# Patient Record
Sex: FEMALE | Race: WHITE | NOT HISPANIC OR LATINO | Employment: UNEMPLOYED | URBAN - METROPOLITAN AREA
[De-identification: names, ages, dates, MRNs, and addresses within clinical notes are randomized per-mention and may not be internally consistent; named-entity substitution may affect disease eponyms.]

---

## 2017-01-05 ENCOUNTER — ROUTINE PRENATAL (OUTPATIENT)
Dept: OBGYN | Facility: CLINIC | Age: 22
End: 2017-01-05
Payer: MEDICAID

## 2017-01-05 VITALS — WEIGHT: 249 LBS | DIASTOLIC BLOOD PRESSURE: 92 MMHG | BODY MASS INDEX: 44.12 KG/M2 | SYSTOLIC BLOOD PRESSURE: 136 MMHG

## 2017-01-05 LAB
APPEARANCE UR: NORMAL
BILIRUB UR STRIP-MCNC: NORMAL MG/DL
COLOR UR AUTO: NORMAL
GLUCOSE UR STRIP.AUTO-MCNC: NORMAL MG/DL
KETONES UR STRIP.AUTO-MCNC: NORMAL MG/DL
LEUKOCYTE ESTERASE UR QL STRIP.AUTO: NORMAL
NITRITE UR QL STRIP.AUTO: NORMAL
PH UR STRIP.AUTO: 6 [PH] (ref 5–8)
PROT UR QL STRIP: 100 MG/DL
RBC UR QL AUTO: NORMAL
SP GR UR STRIP.AUTO: 1.02
UROBILINOGEN UR STRIP-MCNC: NORMAL MG/DL

## 2017-01-05 PROCEDURE — 90040 PR PRENATAL FOLLOW UP: CPT | Performed by: NURSE PRACTITIONER

## 2017-01-05 PROCEDURE — 81002 URINALYSIS NONAUTO W/O SCOPE: CPT | Performed by: NURSE PRACTITIONER

## 2017-01-05 NOTE — PROGRESS NOTES
S:  Pt is  at 36w4d here for routine OB follow up.  No concerns today.  Reports good FM.  Denies VB, LOF, RUCs, or vaginal DC.     O:  Please see above vitals.        FHTs: 140        Fundal ht: 38 cm        Fetal position: unknown        SVE: deferred        GBS negative  -- reviewed w pt.      A:  IUP at 36w4d  Patient Active Problem List    Diagnosis Date Noted   • History of  x 3 needs another csections 2016   • SIDS (sudden infant death syndrome) with last baby at 6 weeks of age 2016   • Tobacco dependence says cut down to 5 cigarettes/day 2016   • Pregnancy 2015   • Supervision of other high risk pregnancy, antepartum 10/15/2015       P:  1.  Continue FKCs.         2.  Labor precautions given.  Instructions given on where to go.  Pt receptive to education.         3.  D/w pt C/S policy. Scheduled pre-op on  and C/S on        4.  Questions answered.         5.  Encouraged adequate water intake       6.  F/u 1wk

## 2017-01-05 NOTE — PROGRESS NOTES
O/B f/u pt. States hands very swollen, headaches. Denies other complications. +   C/S INSTRUCTIONS GIVEN.  Mike #[phone]

## 2017-01-05 NOTE — PROGRESS NOTES
Pre OP Tue Jan 24 @ 9am with Dr Jaimes     C/S Wed, Jan 25 @ 2pm with Dr Jaimes and Dr Sampson to monique  Instructions given.

## 2017-01-05 NOTE — MR AVS SNAPSHOT
Earnestine Freedman Knight   2017 1:30 PM   Routine Prenatal   MRN: [id number]    [facility]   Dept Phone:  [phone]    Description:  Female : 1995   Provider:  EUNICE Foley           Allergies as of 2017     No Known Allergies      You were diagnosed with     Elevated blood pressure   [112593]         Vital Signs     Blood Pressure Weight Last Menstrual Period Smoking Status          136/92 mmHg 112.946 kg (249 lb) (LMP Unknown) Current Every Day Smoker        Basic Information     Date Of Birth Sex Race Ethnicity Preferred Language    1995 Female White Non- English      Your appointments     2017  1:00 PM   OB Follow Up with Taylor Hassan D.N.P.   [facility]    [address]  [place] [address] [phone]            2017  9:00 AM   OB Follow Up with MARIN RICHARDSON   [facility]    [address]  [place] [address] [phone]              Problem List              ICD-10-CM Priority Class Noted - Resolved    Supervision of other high risk pregnancy, antepartum O09.899   10/15/2015 - Present    Pregnancy Z33.1   11/3/2015 - Present    History of  x 3 needs another csections Z98.891   2016 - Present    SIDS (sudden infant death syndrome) with last baby at 6 weeks of age R99   2016 - Present    Tobacco dependence says cut down to 5 cigarettes/day F17.200   2016 - Present      Health Maintenance        Date Due Completion Dates    IMM HEP B VACCINE (1 of 3 - Primary Series) 1993 ---    IMM HEP A VACCINE (1 of 2 - Standard Series) 1994 ---    IMM HPV VACCINE (1 of 3 - Female 3 Dose Series) 2004 ---    IMM VARICELLA (CHICKENPOX) VACCINE (1 of 2 - 2 Dose Adolescent Series) 2006 ---    IMM INFLUENZA (1) 2016    PAP SMEAR 2019    IMM DTaP/Tdap/Td Vaccine (2 - Td) 11/10/2026 11/10/2016, 2015            Results     POCT Urinalysis      Component Value Standard Range & Units    POC Color  Negative    POC Appearance  Negative    POC Leukocyte Esterase trace Negative    POC Nitrites neg Negative    POC Urobiligen  Negative (0.2) mg/dL    POC Protein 100 Negative mg/dL    POC Urine PH 6.0 5.0 - 8.0    POC Blood neg Negative    POC Specific Gravity 1.020 <1.005 - >1.030    POC Ketones neg Negative mg/dL    POC Biliruben  Negative mg/dL    POC Glucose neg Negative mg/dL                        Current Immunizations     Influenza Vaccine Quad Inj (Preserved) 11/4/2015  5:03 AM    Pneumococcal polysaccharide vaccine (PPSV-23) 11/4/2015  5:59 PM    Tdap Vaccine 11/10/2016  9:11 AM, 11/4/2015  5:04 AM      Below and/or attached are the medications your provider expects you to take. Review all of your home medications and newly ordered medications with your provider and/or pharmacist. Follow medication instructions as directed by your provider and/or pharmacist. Please keep your medication list with you and share with your provider. Update the information when medications are discontinued, doses are changed, or new medications (including over-the-counter products) are added; and carry medication information at all times in the event of emergency situations     Allergies:  No Known Allergies          Medications  Valid as of: January 05, 2017 -  2:07 PM    Generic Name Brand Name Tablet Size Instructions for use    Prenatal MV-Min-Fe Fum-FA-DHA   Take  by mouth.        .                 Medicines prescribed today were sent to:     [facility] [address] [place] [address] [address] [place], [place] [address] [address] [place] [address]    Phone: [phone] Fax: [phone]    Open 24 Hours?: No      Medication refill instructions:       If your prescription bottle indicates you have medication refills left, it is not necessary to call your provider’s office. Please contact your pharmacy and they will refill your medication.    If your prescription bottle  indicates you do not have any refills left, you may request refills at any time through one of the following ways: The online [employer] system (except Urgent Care), by calling your provider’s office, or by asking your pharmacy to contact your provider’s office with a refill request. Medication refills are processed only during regular business hours and may not be available until the next business day. Your provider may request additional information or to have a follow-up visit with you prior to refilling your medication.   *Please Note: Medication refills are assigned a new Rx number when refilled electronically. Your pharmacy may indicate that no refills were authorized even though a new prescription for the same medication is available at the pharmacy. Please request the medicine by name with the pharmacy before contacting your provider for a refill.        Instructions         1.  Continue FKCs.         2.  Labor precautions given.  Instructions given on where to go.  Pt receptive to education.         3.  D/w pt C/S policy. Scheduled pre-op on 1/24 and C/S on 1/25       4.  Questions answered.         5.  Encouraged adequate water intake       6.  F/u 1wk            [facility] Status: Patient Declined

## 2017-01-05 NOTE — PATIENT INSTRUCTIONS
1.  Continue FKCs.         2.  Labor precautions given.  Instructions given on where to go.  Pt receptive to education.         3.  D/w pt C/S policy. Scheduled pre-op on 1/24 and C/S on 1/25       4.  Questions answered.         5.  Encouraged adequate water intake       6.  F/u 1wk

## 2017-01-10 ENCOUNTER — HOSPITAL ENCOUNTER (OUTPATIENT)
Facility: MEDICAL CENTER | Age: 22
End: 2017-01-10
Attending: OBSTETRICS & GYNECOLOGY | Admitting: OBSTETRICS & GYNECOLOGY
Payer: MEDICAID

## 2017-01-10 VITALS
HEART RATE: 111 BPM | DIASTOLIC BLOOD PRESSURE: 91 MMHG | OXYGEN SATURATION: 98 % | TEMPERATURE: 98.5 F | HEIGHT: 64 IN | BODY MASS INDEX: 42.51 KG/M2 | SYSTOLIC BLOOD PRESSURE: 134 MMHG | WEIGHT: 249 LBS

## 2017-01-10 LAB
ALBUMIN SERPL BCP-MCNC: 2.9 G/DL (ref 3.2–4.9)
ALBUMIN/GLOB SERPL: 1.1 G/DL
ALP SERPL-CCNC: 154 U/L (ref 30–99)
ALT SERPL-CCNC: 8 U/L (ref 2–50)
ANION GAP SERPL CALC-SCNC: 8 MMOL/L (ref 0–11.9)
APPEARANCE UR: ABNORMAL
AST SERPL-CCNC: 14 U/L (ref 12–45)
BASOPHILS # BLD AUTO: 0.4 % (ref 0–1.8)
BASOPHILS # BLD: 0.04 K/UL (ref 0–0.12)
BILIRUB SERPL-MCNC: 0.3 MG/DL (ref 0.1–1.5)
BUN SERPL-MCNC: 6 MG/DL (ref 8–22)
CALCIUM SERPL-MCNC: 8.4 MG/DL (ref 8.5–10.5)
CHLORIDE SERPL-SCNC: 107 MMOL/L (ref 96–112)
CO2 SERPL-SCNC: 19 MMOL/L (ref 20–33)
COLOR UR AUTO: YELLOW
CREAT SERPL-MCNC: 0.5 MG/DL (ref 0.5–1.4)
EOSINOPHIL # BLD AUTO: 0.1 K/UL (ref 0–0.51)
EOSINOPHIL NFR BLD: 0.9 % (ref 0–6.9)
ERYTHROCYTE [DISTWIDTH] IN BLOOD BY AUTOMATED COUNT: 46.2 FL (ref 35.9–50)
FLUAV H1 2009 PAND RNA SPEC QL NAA+PROBE: NOT DETECTED
FLUAV RNA SPEC QL NAA+PROBE: NEGATIVE
FLUBV RNA SPEC QL NAA+PROBE: NEGATIVE
GFR SERPL CREATININE-BSD FRML MDRD: >60 ML/MIN/1.73 M 2
GLOBULIN SER CALC-MCNC: 2.7 G/DL (ref 1.9–3.5)
GLUCOSE SERPL-MCNC: 96 MG/DL (ref 65–99)
GLUCOSE UR QL STRIP.AUTO: NEGATIVE MG/DL
HCT VFR BLD AUTO: 34.5 % (ref 37–47)
HGB BLD-MCNC: 11.4 G/DL (ref 12–16)
IMM GRANULOCYTES # BLD AUTO: 0.07 K/UL (ref 0–0.11)
IMM GRANULOCYTES NFR BLD AUTO: 0.6 % (ref 0–0.9)
KETONES UR QL STRIP.AUTO: NEGATIVE MG/DL
LEUKOCYTE ESTERASE UR QL STRIP.AUTO: ABNORMAL
LYMPHOCYTES # BLD AUTO: 1.28 K/UL (ref 1–4.8)
LYMPHOCYTES NFR BLD: 11.2 % (ref 22–41)
MCH RBC QN AUTO: 27.9 PG (ref 27–33)
MCHC RBC AUTO-ENTMCNC: 33 G/DL (ref 33.6–35)
MCV RBC AUTO: 84.4 FL (ref 81.4–97.8)
MONOCYTES # BLD AUTO: 0.81 K/UL (ref 0–0.85)
MONOCYTES NFR BLD AUTO: 7.1 % (ref 0–13.4)
NEUTROPHILS # BLD AUTO: 9.11 K/UL (ref 2–7.15)
NEUTROPHILS NFR BLD: 79.8 % (ref 44–72)
NITRITE UR QL STRIP.AUTO: NEGATIVE
NRBC # BLD AUTO: 0 K/UL
NRBC BLD AUTO-RTO: 0 /100 WBC
PH UR STRIP.AUTO: 6.5 [PH]
PLATELET # BLD AUTO: 155 K/UL (ref 164–446)
PMV BLD AUTO: 11.2 FL (ref 9–12.9)
POTASSIUM SERPL-SCNC: 3.8 MMOL/L (ref 3.6–5.5)
PROT SERPL-MCNC: 5.6 G/DL (ref 6–8.2)
PROT UR QL STRIP: NEGATIVE MG/DL
RBC # BLD AUTO: 4.09 M/UL (ref 4.2–5.4)
RBC UR QL AUTO: NEGATIVE
SODIUM SERPL-SCNC: 134 MMOL/L (ref 135–145)
SP GR UR: 1.01
URATE SERPL-MCNC: 5.6 MG/DL (ref 1.9–8.2)
WBC # BLD AUTO: 11.4 K/UL (ref 4.8–10.8)

## 2017-01-10 PROCEDURE — 36415 COLL VENOUS BLD VENIPUNCTURE: CPT

## 2017-01-10 PROCEDURE — 80053 COMPREHEN METABOLIC PANEL: CPT

## 2017-01-10 PROCEDURE — 87503 INFLUENZA DNA AMP PROB ADDL: CPT

## 2017-01-10 PROCEDURE — 59025 FETAL NON-STRESS TEST: CPT | Performed by: OBSTETRICS & GYNECOLOGY

## 2017-01-10 PROCEDURE — 87502 INFLUENZA DNA AMP PROBE: CPT

## 2017-01-10 PROCEDURE — 84550 ASSAY OF BLOOD/URIC ACID: CPT

## 2017-01-10 PROCEDURE — 85025 COMPLETE CBC W/AUTO DIFF WBC: CPT

## 2017-01-10 PROCEDURE — 81002 URINALYSIS NONAUTO W/O SCOPE: CPT

## 2017-01-10 NOTE — PROGRESS NOTES
[facility] LABOR AND DELIVERY TRIAGE PROGRESS NOTE    PATIENT ID:  NAME:  Earnestine Knight  MRN:               [id number]  YOB: 1995     21 y.o. female  at 37w2d.    Subjective: Pt presents complaining of a cough with increasing sputum production x1 week.  She reports that her mother had bronchitis approximately 2 weeks ago with similar symptoms.  She describes a cough starting one week ago with production of green/yellow sputum over the past 2-3 days.  She has also had one episode of nausea and vomiting yesterday.  She denies any fever/chills, changes to urination, constipation, diarrhea, body aches, changes to vision.  She also denies CP but does endorse some shortness of breath.  Denies swelling to hands or feet.    Patient does smoke 2 ppd of cigarettes, denies EtOH or other drug use    Pregnancy complicated by maternal tobacco use    negative  For CTXS.   negative Feels pain   negative for LOF  negative for vaginal bleeding.   positive for fetal movement    ROS: as above    Objective:    Filed Vitals:    01/10/17 1442 01/10/17 1445 01/10/17 1447 01/10/17 1452   BP:  139/98     Pulse: 103 108 98 93   Temp:       Height:       Weight:       SpO2: 99%  97% 99%     Temp (24hrs), Av.9 °C (98.5 °F), Min:36.9 °C (98.5 °F), Max:36.9 °C (98.5 °F)    General: No acute distress, resting comfortably in bed.  HEENT: normocephalic, nontraumatic, PERRLA, EOMI  Cardiovascular: Heart RRR with no murmurs, rubs or gallops. Distal Pulses 2+  Respiratory: symmetric chest expansion, lungs with bilateral crackles, with no wheezes or rhonci  Abdomen: gravid, nontender  Musculoskeletal: strength 5/5 in four extremities  Neuro: non focal with no numbness, tingling or changes in sensation    Cervix:  Not examed  [place]: Uterine Contractions none    FHRM: Baseline 140, Accels, no decels, moderate variability    UA: small leukocytes, otherwise unremarkable  Lab Results   Component Value Date/Time    SODIUM 134*  01/10/2017 03:23 PM    POTASSIUM 3.8 01/10/2017 03:23 PM    CHLORIDE 107 01/10/2017 03:23 PM    CO2 19* 01/10/2017 03:23 PM    GLUCOSE 96 01/10/2017 03:23 PM    BUN 6* 01/10/2017 03:23 PM    CREATININE 0.50 01/10/2017 03:23 PM      Lab Results   Component Value Date/Time    WBC 11.4* 01/10/2017 03:23 PM    RBC 4.09* 01/10/2017 03:23 PM    HEMOGLOBIN 11.4* 01/10/2017 03:23 PM    HEMATOCRIT 34.5* 01/10/2017 03:23 PM    MCV 84.4 01/10/2017 03:23 PM    MCH 27.9 01/10/2017 03:23 PM    MCHC 33.0* 01/10/2017 03:23 PM    MPV 11.2 01/10/2017 03:23 PM    NEUTROPHILS-POLYS 79.80* 01/10/2017 03:23 PM    LYMPHOCYTES 11.20* 01/10/2017 03:23 PM    MONOCYTES 7.10 01/10/2017 03:23 PM    EOSINOPHILS 0.90 01/10/2017 03:23 PM    BASOPHILS 0.40 01/10/2017 03:23 PM      Uric acid 5.6    Assessment/Plan: 21 y.o. female  at 37w2d with likely viral respiratory process vs. Atypical CAP  -Patient evaluated for PIH given initial elevated BP of 150s/100s although quickly decreased to 130s/80s.  UA negative for protein, LFT WNL, Plt 150,   -Mildly elevated WBC of 11.4  -Reactive and reassuring NST, no contractions on TOCO  -Will d/c patient home with Rx for Z-pack, awaiting rapid flu result at time of d/c, patient given Rx for Tamiflu with instructions to fill and take only if notified by house staff  -encourage smoking cessation, oral hydration, and explain likely viral etiology of symptoms  -return precautions given including worsening symptoms, fever, or any other new or concerning symptoms  -Patient is cleared to return home with family. Encouraged to see MD for increased painful uterine contractions @ 3-5, vaginal bleeding, loss of fluid, or other serious symptoms.      Discussed case with Dr. Jaimes, [facility] Attending. Case was discussed and attending agreed with plan prior to discharge of patient.    Brian Padgett M.D.

## 2017-01-10 NOTE — PROGRESS NOTES
"1355 - 20 y/o  EDC , EGA 37.2, here to LDA 5 with mother. C/O cough for the last week, with an increase in green/yellowish sputum. EFM/TOCO applied, Patient states positive FM. Denies vaginal LOF or Bleeding. BP's elevated and MD aware. UC collected, small leukocytes noted.   1400 - Dr Padgett at bedside to assess pt. Discussed with Dr hale POC.  1415 - Orders received for flu swab, PIH labs due to elevated BP's. Orders placed. \  1530 - Lab results and flu swab pending. RN to room, oral hydration provided. Pt resting and \"trying to sleep.\" Call light within reach.   1645 - Lab results viewed by Dr Padgett. Flu lab pending. Orders received to d/c pt home with prescription for abx. Plan to call pt back if flu is +. Pt provided rx for Tamiflu if flu is positive.   0 - Discussed s/s of labor, decrease FM, kick counts, lof/bleeding and when to return back to LND. Pt verbalizes understanding and ambulated off unit, stable on feet.   "

## 2017-01-11 ENCOUNTER — HOSPITAL ENCOUNTER (INPATIENT)
Facility: MEDICAL CENTER | Age: 22
LOS: 4 days | End: 2017-01-15
Attending: OBSTETRICS & GYNECOLOGY | Admitting: OBSTETRICS & GYNECOLOGY
Payer: MEDICAID

## 2017-01-11 ENCOUNTER — ROUTINE PRENATAL (OUTPATIENT)
Dept: OBGYN | Facility: CLINIC | Age: 22
End: 2017-01-11
Payer: MEDICAID

## 2017-01-11 VITALS — WEIGHT: 250 LBS | SYSTOLIC BLOOD PRESSURE: 140 MMHG | BODY MASS INDEX: 42.89 KG/M2 | DIASTOLIC BLOOD PRESSURE: 102 MMHG

## 2017-01-11 DIAGNOSIS — I15.9 SECONDARY HYPERTENSION: ICD-10-CM

## 2017-01-11 LAB
ALBUMIN SERPL BCP-MCNC: 3 G/DL (ref 3.2–4.9)
ALBUMIN/GLOB SERPL: 1.1 G/DL
ALP SERPL-CCNC: 157 U/L (ref 30–99)
ALT SERPL-CCNC: 6 U/L (ref 2–50)
ANION GAP SERPL CALC-SCNC: 9 MMOL/L (ref 0–11.9)
APPEARANCE UR: ABNORMAL
APPEARANCE UR: NORMAL
AST SERPL-CCNC: 12 U/L (ref 12–45)
BACTERIA #/AREA URNS HPF: ABNORMAL /HPF
BASOPHILS # BLD AUTO: 0.6 % (ref 0–1.8)
BASOPHILS # BLD: 0.05 K/UL (ref 0–0.12)
BILIRUB SERPL-MCNC: 0.3 MG/DL (ref 0.1–1.5)
BILIRUB UR QL STRIP.AUTO: NEGATIVE
BILIRUB UR STRIP-MCNC: NORMAL MG/DL
BUN SERPL-MCNC: 9 MG/DL (ref 8–22)
CALCIUM SERPL-MCNC: 8.8 MG/DL (ref 8.5–10.5)
CAOX CRY #/AREA URNS HPF: ABNORMAL /HPF
CHLORIDE SERPL-SCNC: 108 MMOL/L (ref 96–112)
CO2 SERPL-SCNC: 19 MMOL/L (ref 20–33)
COLOR UR AUTO: NORMAL
COLOR UR: YELLOW
CREAT SERPL-MCNC: 0.53 MG/DL (ref 0.5–1.4)
EOSINOPHIL # BLD AUTO: 0.23 K/UL (ref 0–0.51)
EOSINOPHIL NFR BLD: 2.6 % (ref 0–6.9)
EPI CELLS #/AREA URNS HPF: ABNORMAL /HPF
ERYTHROCYTE [DISTWIDTH] IN BLOOD BY AUTOMATED COUNT: 46.9 FL (ref 35.9–50)
GFR SERPL CREATININE-BSD FRML MDRD: >60 ML/MIN/1.73 M 2
GLOBULIN SER CALC-MCNC: 2.8 G/DL (ref 1.9–3.5)
GLUCOSE SERPL-MCNC: 127 MG/DL (ref 65–99)
GLUCOSE UR STRIP.AUTO-MCNC: NEGATIVE MG/DL
GLUCOSE UR STRIP.AUTO-MCNC: NORMAL MG/DL
HCT VFR BLD AUTO: 35.1 % (ref 37–47)
HGB BLD-MCNC: 11.9 G/DL (ref 12–16)
HOLDING TUBE BB 8507: NORMAL
IMM GRANULOCYTES # BLD AUTO: 0.06 K/UL (ref 0–0.11)
IMM GRANULOCYTES NFR BLD AUTO: 0.7 % (ref 0–0.9)
KETONES UR STRIP.AUTO-MCNC: ABNORMAL MG/DL
KETONES UR STRIP.AUTO-MCNC: NORMAL MG/DL
LEUKOCYTE ESTERASE UR QL STRIP.AUTO: ABNORMAL
LEUKOCYTE ESTERASE UR QL STRIP.AUTO: NORMAL
LYMPHOCYTES # BLD AUTO: 1.53 K/UL (ref 1–4.8)
LYMPHOCYTES NFR BLD: 17 % (ref 22–41)
MCH RBC QN AUTO: 28.8 PG (ref 27–33)
MCHC RBC AUTO-ENTMCNC: 33.9 G/DL (ref 33.6–35)
MCV RBC AUTO: 85 FL (ref 81.4–97.8)
MICRO URNS: ABNORMAL
MONOCYTES # BLD AUTO: 0.7 K/UL (ref 0–0.85)
MONOCYTES NFR BLD AUTO: 7.8 % (ref 0–13.4)
MUCOUS THREADS #/AREA URNS HPF: ABNORMAL /HPF
NEUTROPHILS # BLD AUTO: 6.44 K/UL (ref 2–7.15)
NEUTROPHILS NFR BLD: 71.3 % (ref 44–72)
NITRITE UR QL STRIP.AUTO: NEGATIVE
NITRITE UR QL STRIP.AUTO: NORMAL
NRBC # BLD AUTO: 0 K/UL
NRBC BLD AUTO-RTO: 0 /100 WBC
NST ACOUSTIC STIMULATION: NORMAL
NST ACTION NECESSARY: NORMAL
NST ASSESSMENT: NORMAL
NST BASELINE: 130
NST INDICATIONS: NORMAL
NST OTHER DATA: NORMAL
NST READ BY: NORMAL
NST RETURN: NORMAL
NST UTERINE ACTIVITY: NORMAL
PH UR STRIP.AUTO: 6 [PH]
PH UR STRIP.AUTO: 6 [PH] (ref 5–8)
PLATELET # BLD AUTO: 156 K/UL (ref 164–446)
PMV BLD AUTO: 10.7 FL (ref 9–12.9)
POTASSIUM SERPL-SCNC: 3.6 MMOL/L (ref 3.6–5.5)
PROT SERPL-MCNC: 5.8 G/DL (ref 6–8.2)
PROT UR QL STRIP: 100 MG/DL
PROT UR QL STRIP: 50 MG/DL
RBC # BLD AUTO: 4.13 M/UL (ref 4.2–5.4)
RBC # URNS HPF: ABNORMAL /HPF
RBC UR QL AUTO: NEGATIVE
RBC UR QL AUTO: NORMAL
SODIUM SERPL-SCNC: 136 MMOL/L (ref 135–145)
SP GR UR STRIP.AUTO: 1.02
SP GR UR STRIP.AUTO: 1.03
TRANS CELLS #/AREA URNS HPF: ABNORMAL /HPF
URATE SERPL-MCNC: 5.4 MG/DL (ref 1.9–8.2)
UROBILINOGEN UR STRIP-MCNC: NORMAL MG/DL
WBC # BLD AUTO: 9 K/UL (ref 4.8–10.8)
WBC #/AREA URNS HPF: ABNORMAL /HPF

## 2017-01-11 PROCEDURE — 84550 ASSAY OF BLOOD/URIC ACID: CPT

## 2017-01-11 PROCEDURE — 700111 HCHG RX REV CODE 636 W/ 250 OVERRIDE (IP): Performed by: OBSTETRICS & GYNECOLOGY

## 2017-01-11 PROCEDURE — 36415 COLL VENOUS BLD VENIPUNCTURE: CPT

## 2017-01-11 PROCEDURE — 81001 URINALYSIS AUTO W/SCOPE: CPT

## 2017-01-11 PROCEDURE — 4A1HX4Z MONITORING OF PRODUCTS OF CONCEPTION, CARDIAC ELECTRICAL ACTIVITY, EXTERNAL APPROACH: ICD-10-PCS | Performed by: OBSTETRICS & GYNECOLOGY

## 2017-01-11 PROCEDURE — 81002 URINALYSIS NONAUTO W/O SCOPE: CPT | Performed by: NURSE PRACTITIONER

## 2017-01-11 PROCEDURE — 700102 HCHG RX REV CODE 250 W/ 637 OVERRIDE(OP): Performed by: ANESTHESIOLOGY

## 2017-01-11 PROCEDURE — A9270 NON-COVERED ITEM OR SERVICE: HCPCS | Performed by: ANESTHESIOLOGY

## 2017-01-11 PROCEDURE — 700111 HCHG RX REV CODE 636 W/ 250 OVERRIDE (IP)

## 2017-01-11 PROCEDURE — 90040 PR PRENATAL FOLLOW UP: CPT | Performed by: NURSE PRACTITIONER

## 2017-01-11 PROCEDURE — 306828 HCHG ANES-TIME GENERAL: Performed by: OBSTETRICS & GYNECOLOGY

## 2017-01-11 PROCEDURE — 59025 FETAL NON-STRESS TEST: CPT | Performed by: NURSE PRACTITIONER

## 2017-01-11 PROCEDURE — 770007 HCHG ROOM/CARE - OB SEMI PRIVATE (*

## 2017-01-11 PROCEDURE — 85025 COMPLETE CBC W/AUTO DIFF WBC: CPT

## 2017-01-11 PROCEDURE — 10907ZC DRAINAGE OF AMNIOTIC FLUID, THERAPEUTIC FROM PRODUCTS OF CONCEPTION, VIA NATURAL OR ARTIFICIAL OPENING: ICD-10-PCS | Performed by: OBSTETRICS & GYNECOLOGY

## 2017-01-11 PROCEDURE — 700111 HCHG RX REV CODE 636 W/ 250 OVERRIDE (IP): Performed by: ANESTHESIOLOGY

## 2017-01-11 PROCEDURE — 80053 COMPREHEN METABOLIC PANEL: CPT

## 2017-01-11 RX ORDER — MORPHINE SULFATE 4 MG/ML
1-2 INJECTION, SOLUTION INTRAMUSCULAR; INTRAVENOUS
Status: DISCONTINUED | OUTPATIENT
Start: 2017-01-11 | End: 2017-01-12

## 2017-01-11 RX ORDER — SODIUM CHLORIDE, SODIUM LACTATE, POTASSIUM CHLORIDE, CALCIUM CHLORIDE 600; 310; 30; 20 MG/100ML; MG/100ML; MG/100ML; MG/100ML
INJECTION, SOLUTION INTRAVENOUS CONTINUOUS
Status: DISCONTINUED | OUTPATIENT
Start: 2017-01-11 | End: 2017-01-12

## 2017-01-11 RX ORDER — PROCHLORPERAZINE 25 MG
25 SUPPOSITORY, RECTAL RECTAL EVERY 12 HOURS PRN
Status: DISCONTINUED | OUTPATIENT
Start: 2017-01-11 | End: 2017-01-12

## 2017-01-11 RX ORDER — SODIUM CHLORIDE, SODIUM LACTATE, POTASSIUM CHLORIDE, CALCIUM CHLORIDE 600; 310; 30; 20 MG/100ML; MG/100ML; MG/100ML; MG/100ML
1500 INJECTION, SOLUTION INTRAVENOUS
Status: COMPLETED | OUTPATIENT
Start: 2017-01-11 | End: 2017-01-11

## 2017-01-11 RX ORDER — MORPHINE SULFATE 4 MG/ML
3-4 INJECTION, SOLUTION INTRAMUSCULAR; INTRAVENOUS
Status: DISCONTINUED | OUTPATIENT
Start: 2017-01-11 | End: 2017-01-12

## 2017-01-11 RX ORDER — METOCLOPRAMIDE HYDROCHLORIDE 5 MG/ML
10 INJECTION INTRAMUSCULAR; INTRAVENOUS
Status: COMPLETED | OUTPATIENT
Start: 2017-01-11 | End: 2017-01-11

## 2017-01-11 RX ORDER — OXYCODONE HYDROCHLORIDE AND ACETAMINOPHEN 5; 325 MG/1; MG/1
1 TABLET ORAL EVERY 4 HOURS PRN
Status: DISCONTINUED | OUTPATIENT
Start: 2017-01-11 | End: 2017-01-12

## 2017-01-11 RX ORDER — ONDANSETRON 2 MG/ML
4 INJECTION INTRAMUSCULAR; INTRAVENOUS EVERY 6 HOURS PRN
Status: DISCONTINUED | OUTPATIENT
Start: 2017-01-11 | End: 2017-01-12

## 2017-01-11 RX ORDER — OXYCODONE HYDROCHLORIDE AND ACETAMINOPHEN 5; 325 MG/1; MG/1
2 TABLET ORAL EVERY 4 HOURS PRN
Status: DISCONTINUED | OUTPATIENT
Start: 2017-01-11 | End: 2017-01-12

## 2017-01-11 RX ORDER — METHYLERGONOVINE MALEATE 0.2 MG/ML
0.2 INJECTION INTRAVENOUS
Status: DISCONTINUED | OUTPATIENT
Start: 2017-01-11 | End: 2017-01-12

## 2017-01-11 RX ORDER — ONDANSETRON 4 MG/1
4 TABLET, ORALLY DISINTEGRATING ORAL EVERY 6 HOURS PRN
Status: DISCONTINUED | OUTPATIENT
Start: 2017-01-11 | End: 2017-01-12

## 2017-01-11 RX ORDER — DIPHENHYDRAMINE HCL 25 MG
25 TABLET ORAL EVERY 6 HOURS PRN
Status: DISCONTINUED | OUTPATIENT
Start: 2017-01-11 | End: 2017-01-12

## 2017-01-11 RX ORDER — DIPHENHYDRAMINE HYDROCHLORIDE 50 MG/ML
25 INJECTION INTRAMUSCULAR; INTRAVENOUS EVERY 6 HOURS PRN
Status: DISCONTINUED | OUTPATIENT
Start: 2017-01-11 | End: 2017-01-12

## 2017-01-11 RX ORDER — CEFAZOLIN SODIUM 1 G/3ML
1 INJECTION, POWDER, FOR SOLUTION INTRAMUSCULAR; INTRAVENOUS
Status: DISCONTINUED | OUTPATIENT
Start: 2017-01-11 | End: 2017-01-12

## 2017-01-11 RX ORDER — IBUPROFEN 600 MG/1
600 TABLET ORAL EVERY 6 HOURS PRN
Status: DISCONTINUED | OUTPATIENT
Start: 2017-01-11 | End: 2017-01-12

## 2017-01-11 RX ADMIN — Medication 2000 ML/HR: at 23:16

## 2017-01-11 RX ADMIN — FAMOTIDINE 20 MG: 10 INJECTION INTRAVENOUS at 22:09

## 2017-01-11 RX ADMIN — SODIUM CHLORIDE, POTASSIUM CHLORIDE, SODIUM LACTATE AND CALCIUM CHLORIDE 1000 ML: 600; 310; 30; 20 INJECTION, SOLUTION INTRAVENOUS at 22:07

## 2017-01-11 RX ADMIN — SODIUM CHLORIDE, POTASSIUM CHLORIDE, SODIUM LACTATE AND CALCIUM CHLORIDE: 600; 310; 30; 20 INJECTION, SOLUTION INTRAVENOUS at 22:08

## 2017-01-11 RX ADMIN — SODIUM CITRATE AND CITRIC ACID MONOHYDRATE 30 ML: 500; 334 SOLUTION ORAL at 22:08

## 2017-01-11 RX ADMIN — METOCLOPRAMIDE 10 MG: 5 INJECTION, SOLUTION INTRAMUSCULAR; INTRAVENOUS at 22:09

## 2017-01-11 ASSESSMENT — LIFESTYLE VARIABLES
ALCOHOL_USE: NO
DO YOU DRINK ALCOHOL: NO
EVER_SMOKED: YES

## 2017-01-11 ASSESSMENT — PAIN SCALES - GENERAL: PAINLEVEL_OUTOF10: 0

## 2017-01-11 NOTE — MR AVS SNAPSHOT
Earnestine Freedman Knight   2017 1:00 PM   Routine Prenatal   MRN: [id number]    Department[facility]   Dept Phone:  [phone]    Description:  Female : 1995   Provider:  Taylor Hassan D.N.P.           Allergies as of 2017     No Known Allergies      You were diagnosed with     Elevated BP   [054453]         Vital Signs     Blood Pressure Weight Last Menstrual Period Smoking Status          140/102 mmHg 113.399 kg (250 lb) (LMP Unknown) Current Every Day Smoker        Basic Information     Date Of Birth Sex Race Ethnicity Preferred Language    1995 Female White Non- English      Your appointments     2017  1:30 PM   Nurse Visit with PC NURSE   [facility])    [address]  [place] [address] [phone]            2017  9:00 AM   OB Follow Up with PC MD   [facility])    [address]  [place] [address] [phone]              Problem List              ICD-10-CM Priority Class Noted - Resolved    Supervision of other high risk pregnancy, antepartum O09.899   10/15/2015 - Present    Pregnancy Z33.1   11/3/2015 - Present    History of  x 3 needs another csections Z98.891   2016 - Present    SIDS (sudden infant death syndrome) with last baby at 6 weeks of age R99   2016 - Present    Tobacco dependence says cut down to 5 cigarettes/day F17.200   2016 - Present      Health Maintenance        Date Due Completion Dates    IMM HEP B VACCINE (1 of 3 - Primary Series) 1993 ---    IMM HEP A VACCINE (1 of 2 - Standard Series) 1994 ---    IMM HPV VACCINE (1 of 3 - Female 3 Dose Series) 2004 ---    IMM VARICELLA (CHICKENPOX) VACCINE (1 of 2 - 2 Dose Adolescent Series) 2006 ---    IMM INFLUENZA (1) 2016    PAP SMEAR 2019    IMM DTaP/Tdap/Td Vaccine (2 - Td) 11/10/2026 11/10/2016, 2015            Results     POCT Urinalysis     Component Value Standard Range & Units    POC Color  Negative    POC Appearance  Negative    POC Leukocyte Esterase TRACE Negative    POC Nitrites NEG Negative    POC Urobiligen  Negative (0.2) mg/dL    POC Protein 100 Negative mg/dL    POC Urine PH 6.0 5.0 - 8.0    POC Blood NEG Negative    POC Specific Gravity 1.020 <1.005 - >1.030    POC Ketones SMALL Negative mg/dL    POC Biliruben  Negative mg/dL    POC Glucose NEG Negative mg/dL                        Current Immunizations     Influenza Vaccine Quad Inj (Preserved) 11/4/2015  5:03 AM    Pneumococcal polysaccharide vaccine (PPSV-23) 11/4/2015  5:59 PM    Tdap Vaccine 11/10/2016  9:11 AM, 11/4/2015  5:04 AM      Below and/or attached are the medications your provider expects you to take. Review all of your home medications and newly ordered medications with your provider and/or pharmacist. Follow medication instructions as directed by your provider and/or pharmacist. Please keep your medication list with you and share with your provider. Update the information when medications are discontinued, doses are changed, or new medications (including over-the-counter products) are added; and carry medication information at all times in the event of emergency situations     Allergies:  No Known Allergies          Medications  Valid as of: January 11, 2017 -  3:42 PM    Generic Name Brand Name Tablet Size Instructions for use    Prenatal MV-Min-Fe Fum-FA-DHA   Take  by mouth.        .                 Medicines prescribed today were sent to:     [facility] [address] [place] [address] [address] [place], [place] [address] [address] [place] [address]    Phone: [phone] Fax: [phone]    Open 24 Hours?: No      Medication refill instructions:       If your prescription bottle indicates you have medication refills left, it is not necessary to call your provider’s office. Please contact your pharmacy and they will refill your medication.    If your prescription bottle indicates you do not have  any refills left, you may request refills at any time through one of the following ways: The online [employer] system (except Urgent Care), by calling your provider’s office, or by asking your pharmacy to contact your provider’s office with a refill request. Medication refills are processed only during regular business hours and may not be available until the next business day. Your provider may request additional information or to have a follow-up visit with you prior to refilling your medication.   *Please Note: Medication refills are assigned a new Rx number when refilled electronically. Your pharmacy may indicate that no refills were authorized even though a new prescription for the same medication is available at the pharmacy. Please request the medicine by name with the pharmacy before contacting your provider for a refill.        Your To Do List     Future Labs/Procedures Complete By Expires    URINETOTAL PROTEIN 24 HR  As directed 1/11/2018         [employer] Status: Patient Declined

## 2017-01-11 NOTE — PROGRESS NOTES
S) Pt is a 21 y.o.   at 37w3d  gestation. Routine prenatal care today. Patient at L&D yesterday for elevated BP. PIH labs normal. Was told BP elevated because she was sick. Had neg influenza test.   DC home stable at that time. BP's decreased while resting on L&D.  Reports good  fetal movement. Denies cramping, bleeding or leaking of fluid. Denies dysuria. Good self-care activities identified. States has been having headaches but has upper respiratory infection.     O) see flow         Filed Vitals:    17 1313   BP: 144/96   Weight: 113.399 kg (250 lb)           Lab:  Recent Results (from the past 336 hour(s))   POCT Urinalysis    Collection Time: 17  1:57 PM   Result Value Ref Range    POC Color  Negative    POC Appearance  Negative    POC Leukocyte Esterase trace Negative    POC Nitrites neg Negative    POC Urobiligen  Negative (0.2) mg/dL    POC Protein 100 Negative mg/dL    POC Urine PH 6.0 5.0 - 8.0    POC Blood neg Negative    POC Specific Gravity 1.020 <1.005 - >1.030    POC Ketones neg Negative mg/dL    POC Biliruben  Negative mg/dL    POC Glucose neg Negative mg/dL   GRP B STREP, BY PCR (DOYLE BROTH)    Collection Time: 01/10/17 12:00 AM   Result Value Ref Range    Strep Gp B DNA PCR NEG    POC UA    Collection Time: 01/10/17  2:07 PM   Result Value Ref Range    POC Color Yellow     POC Appearance Cloudy (A)     POC Glucose Negative Negative mg/dL    POC Ketones Negative Negative mg/dL    POC Specific Gravity 1.015 1.005-1.030    POC Blood Negative Negative    POC Urine PH 6.5 5.0-8.0    POC Protein Negative Negative mg/dL    POC Nitrites Negative Negative    POC Leukocyte Esterase Small (A) Negative   INFLUENZA BY PCR, A/B/H1N1    Collection Time: 01/10/17  2:30 PM   Result Value Ref Range    Influenza virus A RNA Negative Negative    Influenza virus B RNA Negative Negative    Influenza A 2009, H1N1, PCR Not Detected Negative   COMP METABOLIC PANEL    Collection Time: 01/10/17  3:23 PM    Result Value Ref Range    Sodium 134 (L) 135 - 145 mmol/L    Potassium 3.8 3.6 - 5.5 mmol/L    Chloride 107 96 - 112 mmol/L    Co2 19 (L) 20 - 33 mmol/L    Anion Gap 8.0 0.0 - 11.9    Glucose 96 65 - 99 mg/dL    Bun 6 (L) 8 - 22 mg/dL    Creatinine 0.50 0.50 - 1.40 mg/dL    Calcium 8.4 (L) 8.5 - 10.5 mg/dL    AST(SGOT) 14 12 - 45 U/L    ALT(SGPT) 8 2 - 50 U/L    Alkaline Phosphatase 154 (H) 30 - 99 U/L    Total Bilirubin 0.3 0.1 - 1.5 mg/dL    Albumin 2.9 (L) 3.2 - 4.9 g/dL    Total Protein 5.6 (L) 6.0 - 8.2 g/dL    Globulin 2.7 1.9 - 3.5 g/dL    A-G Ratio 1.1 g/dL   CBC WITH DIFFERENTIAL    Collection Time: 01/10/17  3:23 PM   Result Value Ref Range    WBC 11.4 (H) 4.8 - 10.8 K/uL    RBC 4.09 (L) 4.20 - 5.40 M/uL    Hemoglobin 11.4 (L) 12.0 - 16.0 g/dL    Hematocrit 34.5 (L) 37.0 - 47.0 %    MCV 84.4 81.4 - 97.8 fL    MCH 27.9 27.0 - 33.0 pg    MCHC 33.0 (L) 33.6 - 35.0 g/dL    RDW 46.2 35.9 - 50.0 fL    Platelet Count 155 (L) 164 - 446 K/uL    MPV 11.2 9.0 - 12.9 fL    Neutrophils-Polys 79.80 (H) 44.00 - 72.00 %    Lymphocytes 11.20 (L) 22.00 - 41.00 %    Monocytes 7.10 0.00 - 13.40 %    Eosinophils 0.90 0.00 - 6.90 %    Basophils 0.40 0.00 - 1.80 %    Immature Granulocytes 0.60 0.00 - 0.90 %    Nucleated RBC 0.00 /100 WBC    Neutrophils (Absolute) 9.11 (H) 2.00 - 7.15 K/uL    Lymphs (Absolute) 1.28 1.00 - 4.80 K/uL    Monos (Absolute) 0.81 0.00 - 0.85 K/uL    Eos (Absolute) 0.10 0.00 - 0.51 K/uL    Baso (Absolute) 0.04 0.00 - 0.12 K/uL    Immature Granulocytes (abs) 0.07 0.00 - 0.11 K/uL    NRBC (Absolute) 0.00 K/uL   URIC ACID    Collection Time: 01/10/17  3:23 PM   Result Value Ref Range    Uric Acid 5.6 1.9 - 8.2 mg/dL   ESTIMATED GFR    Collection Time: 01/10/17  3:23 PM   Result Value Ref Range    GFR If African American >60 >60 mL/min/1.73 m 2    GFR If Non African American >60 >60 mL/min/1.73 m 2          Pertinent ultrasound - normal fetal survey       2+ protein on clean catch UA today.            A)  IUP at 37w3d       S=D         Patient Active Problem List    Diagnosis Date Noted   • History of  x 3 needs another csections 2016   • SIDS (sudden infant death syndrome) with last baby at 6 weeks of age 2016   • Tobacco dependence says cut down to 5 cigarettes/day 2016   • Pregnancy 2015   • Supervision of other high risk pregnancy, antepartum 10/15/2015       P) s/s labor vs general discomforts. Fetal movements reviewed. General ed and anticipatory guidance. Nutrition/exercise/vitamin. Plans breast. Understands c/s instructions. Continue PNV. NST now with serial BP's.   Serial BP's on NST now 138/76, 127/87, 147/84,     Discussed case with Dr. Flavio Pacheco, due to elevated BP at clinic and 2+ proteinuria, patient should go to L&D for eval again. Patient called, she will go to L&D for further eval.

## 2017-01-11 NOTE — MR AVS SNAPSHOT
Earnestine Shahtaj Knight   2017 2:00 PM   Routine Prenatal   MRN: [id number]    Department[facility]  [facility]   Dept Phone:  [phone]    Description:  Female : 1995   Provider:  Taylor Hassan D.N.P.           Allergies as of 2017     No Known Allergies      You were diagnosed with     Secondary hypertension   [[id number]]         Vital Signs     Last Menstrual Period Smoking Status                (LMP Unknown) Current Every Day Smoker          Basic Information     Date Of Birth Sex Race Ethnicity Preferred Language    1995 Female White Non- English      Your appointments     2017  1:30 PM   Nurse Visit with PC NURSE   [facility])    [address]  [place] [address] [phone]            2017  9:00 AM   OB Follow Up with MARIN RICHARDSON   [facility])    [address]  [place] [address] [phone]              Problem List              ICD-10-CM Priority Class Noted - Resolved    Supervision of other high risk pregnancy, antepartum O09.899   10/15/2015 - Present    Pregnancy Z33.1   11/3/2015 - Present    History of  x 3 needs another csections Z98.891   2016 - Present    SIDS (sudden infant death syndrome) with last baby at 6 weeks of age R99   2016 - Present    Tobacco dependence says cut down to 5 cigarettes/day F17.200   2016 - Present      Health Maintenance        Date Due Completion Dates    IMM HEP B VACCINE (1 of 3 - Primary Series) 1993 ---    IMM HEP A VACCINE (1 of 2 - Standard Series) 1994 ---    IMM HPV VACCINE (1 of 3 - Female 3 Dose Series) 2004 ---    IMM VARICELLA (CHICKENPOX) VACCINE (1 of 2 - 2 Dose Adolescent Series) 2006 ---    IMM INFLUENZA (1) 2016    PAP SMEAR 2019    IMM DTaP/Tdap/Td Vaccine (2 - Td) 11/10/2026 11/10/2016, 2015            Results     POCT Fetal Nonstress Test      Component    NST Indications       elevated bp    NST Baseline    130    NST Uterine Activity    none    NST Acoustic Stimulation    none    NST Assessment    category one, pos accels, no decels, moderate variability    NST Action Necessary    NST Other Data    bp on zug007/76, 127/87, 147/84    Comment:     start 24 hour urine collection return on Friday    NST Return    NST Read By                        Current Immunizations     Influenza Vaccine Quad Inj (Preserved) 11/4/2015  5:03 AM    Pneumococcal polysaccharide vaccine (PPSV-23) 11/4/2015  5:59 PM    Tdap Vaccine 11/10/2016  9:11 AM, 11/4/2015  5:04 AM      Below and/or attached are the medications your provider expects you to take. Review all of your home medications and newly ordered medications with your provider and/or pharmacist. Follow medication instructions as directed by your provider and/or pharmacist. Please keep your medication list with you and share with your provider. Update the information when medications are discontinued, doses are changed, or new medications (including over-the-counter products) are added; and carry medication information at all times in the event of emergency situations     Allergies:  No Known Allergies          Medications  Valid as of: January 11, 2017 -  3:42 PM    Generic Name Brand Name Tablet Size Instructions for use    Prenatal MV-Min-Fe Fum-FA-DHA   Take  by mouth.        .                 Medicines prescribed today were sent to:     [facility] [address] [place], [place] [address] [address] [place], [place] [address] [address] [place] [address]    Phone: [phone] Fax: [phone]    Open 24 Hours?: No      Medication refill instructions:       If your prescription bottle indicates you have medication refills left, it is not necessary to call your provider’s office. Please contact your pharmacy and they will refill your medication.    If your prescription bottle indicates you do not have any refills left, you may request refills at any time through one of the following  ways: The online WordRakehart system (except Urgent Care), by calling your provider’s office, or by asking your pharmacy to contact your provider’s office with a refill request. Medication refills are processed only during regular business hours and may not be available until the next business day. Your provider may request additional information or to have a follow-up visit with you prior to refilling your medication.   *Please Note: Medication refills are assigned a new Rx number when refilled electronically. Your pharmacy may indicate that no refills were authorized even though a new prescription for the same medication is available at the pharmacy. Please request the medicine by name with the pharmacy before contacting your provider for a refill.           MyChart Status: Patient Declined

## 2017-01-11 NOTE — IP AVS SNAPSHOT
After Visit Summary                                                                                                                Earnestine Knight   MRN: [id number]    Department:  POST PARTUM 31   2017           Your appointments     2017  9:30 AM   Post Partum  Check with MARIN RICHARDSON   [facility])    [address]  [facility] [address]   [phone]              Follow-up Information     1. Follow up with Yu Prabhakar M.D. In 1 week.    Specialty:  OB/Gyn    Why:  incision check 1 week    Contact information    [address]  [facility] [address]  [phone]         I assume responsibility for securing a follow-up  Screening blood test on my baby within the specified date range.    -                  Discharge Instructions       POSTPARTUM DISCHARGE INSTRUCTIONS FOR MOM    YOB: 1995   Age: 21 y.o.               Admit Date: 2017     Discharge Date: 2017  Attending Doctor:  Yu Prabhakar M.D.                  Allergies:  Review of patient's allergies indicates no known allergies.    Discharged to home by car. Discharged via wheelchair, hospital escort: Yes.  Special equipment needed: Not Applicable  Belongings with: Personal  Be sure to schedule a follow-up appointment with your primary care doctor or any specialists as instructed.     Discharge Plan:   Diet Plan: Discussed  Activity Level: Discussed  Confirmed Follow up Appointment: Patient to Call and Schedule Appointment  Confirmed Symptoms Management: Discussed  Medication Reconciliation Updated: Yes  Influenza Vaccine Indication: Patient Refuses    REASONS TO CALL YOUR OBSTETRICIAN:  1.   Persistent fever or shaking chills (Temperature higher than 100.4)  2.   Heavy bleeding (soaking more than 1 pad per hour); Passing clots  3.   Foul odor from vagina  4.   Mastitis (Breast infection; breast pain, chills, fever, redness)  5.   Urinary pain, burning or frequency  6.    "Abdominal incision infection  7.   Severe depression longer than 24 hours    HAND WASHING  · Prior to handling the baby.  · Before breastfeeding or bottle feeding baby.  · After using the bathroom or changing the baby's diaper.    WOUND CARE  Ask your physician for additional care instructions.  In general:    ·  Incision:      · Keep clean and dry.    · Do NOT lift anything heavier than your baby for up to 6 weeks.    · There should not be any opening or pus.      VAGINAL CARE  · Nothing inside vagina for 6 weeks: no sexual intercourse, tampons or douching.  · Bleeding may continue for 2-4 weeks.  Amount may vary.    · Call your physician for heavy bleeding which means soaking more than 1 pad per hour    BIRTH CONTROL  · It is possible to become pregnant at any time after delivery and while breastfeeding.  · Plan to discuss a method of birth control with your physician at your follow up visit. visit.    DIET AND ELIMINATION  · Eating more fiber (bran cereal, fruits, and vegetables) and drinking plenty of fluids will help to avoid constipation.  · Urinary frequency after childbirth is normal.    POSTPARTUM BLUES  During the first few days after birth, you may experience a sense of the \"blues\" which may include impatience, irritability or even crying.  These feeling come and go quickly.  However, as many as 1 in 10 women experience emotional symptoms known as postpartum depression.    Postpartum depression:  May start as early as the second or third day after delivery or take several weeks or months to develop.  Symptoms of \"blues\" are present, but are more intense:  Crying spells; loss of appetite; feelings of hopelessness or loss of control; fear of touching the baby; over concern or no concern at all about the baby; little or no concern about your own appearance/caring for yourself; and/or inability to sleep or excessive sleeping.  Contact your physician if you are experiencing any of these " "symptoms.    Crisis Hotline:  · [place] Crisis Hotline:  3-015-ZGAYPKO  Or 1-154.413.2246  · [place] Crisis Hotline:  1-777.623.1234  Or 758-037-3581    PREVENTING SHAKEN BABY:  If you are angry or stressed, PUT THE BABY IN THE CRIB, step away, take some deep breaths, and wait until you are calm to care for the baby.  DO NOT SHAKE THE BABY.  You are not alone, call a supporter for help.    · Crisis Call Center 24/7 crisis line 393-728-9416 or 1-826.883.8482  · You can also text them, text \"ANSWER\" to 005396    QUIT SMOKING/TOBACCO USE:  I understand the use of any tobacco products increases my chance of suffering from future heart disease and could cause other illnesses which may shorten my life. Quitting the use of tobacco products is the single most important thing I can do to improve my health. For further information on smoking / tobacco cessation call a Toll Free Quit Line at 1-367.323.7185 (*National [employer] [place]) or 1-664.809.1494 (American Lung Association) or you can access the web based program at www.lungusa.org.    · [place] Tobacco Users Help Line:  (907) 956-4371       Toll Free: 1-668.552.2798  · Quit Tobacco Program [facility] (018)258-1449    DEPRESSION / SUICIDE RISK:  As you are discharged from this [facility], it is important to learn how to keep safe from harming yourself.    Recognize the warning signs:  · Abrupt changes in personality, positive or negative- including increase in energy   · Giving away possessions  · Change in eating patterns- significant weight changes-  positive or negative  · Change in sleeping patterns- unable to sleep or sleeping all the time   · Unwillingness or inability to communicate  · Depression  · Unusual sadness, discouragement and loneliness  · Talk of wanting to die  · Neglect of personal appearance   · Rebelliousness- reckless behavior  · Withdrawal from people/activities they love  · Confusion- inability to " concentrate     If you or a loved one observes any of these behaviors or has concerns about self-harm, here's what you can do:  · Talk about it- your feelings and reasons for harming yourself  · Remove any means that you might use to hurt yourself (examples: pills, rope, extension cords, firearm)  · Get professional help from the community (Mental Health, Substance Abuse, psychological counseling)  · Do not be alone:Call your Safe Contact- someone whom you trust who will be there for you.  · Call your local CRISIS HOTLINE 834-6430 or 803-678-4054  · Call your local Children's Mobile Crisis Response Team [place] (202) 689-3877 or www.Pixable  · Call the toll free National Suicide Prevention Hotlines   · National Suicide Prevention Lifeline 015-374-FGTE (1824)  · [place] Hope Line Network 800-SUICIDE (544-6063)    DISCHARGE SURVEY:  Thank you for choosing [facility].  We hope we provided you with very good care.  You may be receiving a survey in the mail.  Please fill it out.  Your opinion is valuable to us.    ADDITIONAL EDUCATIONAL MATERIALS GIVEN TO PATIENT:        My signature on this form indicates that:  1.  I have reviewed and understand the above information  2.  My questions regarding this information have been answered to my satisfaction.  3.  I have formulated a plan with my discharge nurse to obtain my prescribed medication for home.         Discharge Medication Instructions:    Below are the medications your physician expects you to take upon discharge:    Review all your home medications and newly ordered medications with your doctor and/or pharmacist. Follow medication instructions as directed by your doctor and/or pharmacist.    Please keep your medication list with you and share with your physician.               Medication List      START taking these medications        Instructions     MG Caps   Last time this was given:  100 mg on 1/14/2017  9:03 AM    Take 100 mg by mouth  2 times a day as needed for Constipation.   Dose:  100 mg       ferrous sulfate 325 (65 FE) MG EC tablet    Take 1 Tab by mouth 3 times a day, with meals.   Dose:  325 mg       ibuprofen 600 MG Tabs   Last time this was given:  600 mg on 1/15/2017 10:26 AM   Commonly known as:  MOTRIN    Take 1 Tab by mouth every 6 hours as needed for Mild Pain.   Dose:  600 mg       oxycodone-acetaminophen 5-325 MG Tabs   Commonly known as:  PERCOCET    Take 1 Tab by mouth every 6 hours as needed for Moderate Pain ((Pain Scale 4-6)).   Dose:  1 Tab         CONTINUE taking these medications        Instructions    PRENATAL 1 PO    Take  by mouth.               Crisis Hotline:     [place] Crisis Hotline:  1-976-MDWDACH or 1-817.681.8436    [place] Crisis Hotline:    1-171.651.6080 or 631-900-7256        Disclaimer           _____________________________________                     __________       ________       Patient/Mother Signature or Legal                          Date                   Time

## 2017-01-11 NOTE — IP AVS SNAPSHOT
[email or web address] Access Code: Activation code not generated  Current [email or web address] Status: Patient Declined    Your email address is not on file at [facility].  Email Addresses are required for you to sign up for [email or web address], please contact [phone] to verify your personal information and to provide your email address prior to attempting to register for [email or web address].    Earnestine Shahtaj Knight  [address] [place] [address]    [employer]  A secure, online tool to manage your health information     [employer]’s snapp.me® is a secure, online tool that connects you to your personalized health information from the privacy of your home -- day or night - making it very easy for you to manage your healthcare. Once the activation process is completed, you can even access your medical information using the snapp.me isrrael, which is available for free in the Apple Isrrael store or Google Play store.     To learn more about [email or web address], visit [email or web address]    There are two levels of access available (as shown below):   My Chart Features  [facility] Primary Care Doctor [facility]  Specialists [facility]  Urgent  Care Non-[facility] Primary Care Doctor   Email your healthcare team securely and privately 24/7 X X X    Manage appointments: schedule your next appointment; view details of past/upcoming appointments X      Request prescription refills. X      View recent personal medical records, including lab and immunizations X X X X   View health record, including health history, allergies, medications X X X X   Read reports about your outpatient visits, procedures, consult and ER notes X X X X   See your discharge summary, which is a recap of your hospital and/or ER visit that includes your diagnosis, lab results, and care plan X X  X     How to register for [employer]:  Once your e-mail address has been verified, follow the following steps to sign up for [employer].     1. Go to  [email or web address]  2. Click on the Sign Up Now box, which takes you to  the New Member Sign Up page. You will need to provide the following information:  a. Enter your GoEuro Access Code exactly as it appears at the top of this page. (You will not need to use this code after you’ve completed the sign-up process. If you do not sign up before the expiration date, you must request a new code.)   b. Enter your date of birth.   c. Enter your home email address.   d. Click Submit, and follow the next screen’s instructions.  3. Create a GoEuro ID. This will be your GoEuro login ID and cannot be changed, so think of one that is secure and easy to remember.  4. Create a GoEuro password. You can change your password at any time.  5. Enter your Password Reset Question and Answer. This can be used at a later time if you forget your password.   6. Enter your e-mail address. This allows you to receive e-mail notifications when new information is available in GoEuro.  7. Click Sign Up. You can now view your health information.    For assistance activating your GoEuro account, call [phone]

## 2017-01-11 NOTE — PROGRESS NOTES
Pt here today for OB follow up  Pt states no complaints.   Reports +FM  WT:250lb  BP:144/96  Good # [phone]   Pt seen at l&d yesterday for cough.

## 2017-01-11 NOTE — IP AVS SNAPSHOT
1/15/2017          Earnestine Knight  [address]  [place] [address]    Dear Earnestine:    Counts include 234 beds at the [facility] wants to ensure your discharge home is safe and you or your loved ones have had all your questions answered regarding your care after you leave the hospital.    You may receive a telephone call within two days of your discharge.  This call is to make certain you understand your discharge instructions as well as ensure we provided you with the best care possible during your stay with us.     The call will only last approximately 3-5 minutes and will be done by a nurse.    Once again, we want to ensure your discharge home is safe and that you have a clear understanding of any next steps in your care.  If you have any questions or concerns, please do not hesitate to contact us, we are here for you.  Thank you for choosing [facility] for your healthcare needs.    Sincerely,    Kris Archibald    [facility]

## 2017-01-12 PROCEDURE — 700111 HCHG RX REV CODE 636 W/ 250 OVERRIDE (IP): Performed by: ANESTHESIOLOGY

## 2017-01-12 PROCEDURE — 700111 HCHG RX REV CODE 636 W/ 250 OVERRIDE (IP): Performed by: NURSE PRACTITIONER

## 2017-01-12 PROCEDURE — A9270 NON-COVERED ITEM OR SERVICE: HCPCS | Performed by: OBSTETRICS & GYNECOLOGY

## 2017-01-12 PROCEDURE — 700101 HCHG RX REV CODE 250

## 2017-01-12 PROCEDURE — 700102 HCHG RX REV CODE 250 W/ 637 OVERRIDE(OP): Performed by: ANESTHESIOLOGY

## 2017-01-12 PROCEDURE — 700112 HCHG RX REV CODE 229: Performed by: OBSTETRICS & GYNECOLOGY

## 2017-01-12 PROCEDURE — 770007 HCHG ROOM/CARE - OB SEMI PRIVATE (*

## 2017-01-12 PROCEDURE — 304966 HCHG RECOVERY SVSC TIME ADDL 1/2 HR

## 2017-01-12 PROCEDURE — 59514 CESAREAN DELIVERY ONLY: CPT

## 2017-01-12 PROCEDURE — A9270 NON-COVERED ITEM OR SERVICE: HCPCS | Performed by: ANESTHESIOLOGY

## 2017-01-12 PROCEDURE — 304964 HCHG RECOVERY ROOM TIME 1HR

## 2017-01-12 PROCEDURE — 700111 HCHG RX REV CODE 636 W/ 250 OVERRIDE (IP): Performed by: OBSTETRICS & GYNECOLOGY

## 2017-01-12 PROCEDURE — 36415 COLL VENOUS BLD VENIPUNCTURE: CPT

## 2017-01-12 PROCEDURE — 305385 HCHG SURGICAL SERVICES 1/4 HOUR

## 2017-01-12 RX ORDER — SODIUM CHLORIDE, SODIUM LACTATE, POTASSIUM CHLORIDE, CALCIUM CHLORIDE 600; 310; 30; 20 MG/100ML; MG/100ML; MG/100ML; MG/100ML
INJECTION, SOLUTION INTRAVENOUS CONTINUOUS
Status: DISCONTINUED | OUTPATIENT
Start: 2017-01-12 | End: 2017-01-15 | Stop reason: HOSPADM

## 2017-01-12 RX ORDER — VITAMIN A ACETATE, BETA CAROTENE, ASCORBIC ACID, CHOLECALCIFEROL, .ALPHA.-TOCOPHEROL ACETATE, DL-, THIAMINE MONONITRATE, RIBOFLAVIN, NIACINAMIDE, PYRIDOXINE HYDROCHLORIDE, FOLIC ACID, CYANOCOBALAMIN, CALCIUM CARBONATE, FERROUS FUMARATE, ZINC OXIDE, CUPRIC OXIDE 3080; 12; 120; 400; 1; 1.84; 3; 20; 22; 920; 25; 200; 27; 10; 2 [IU]/1; UG/1; MG/1; [IU]/1; MG/1; MG/1; MG/1; MG/1; MG/1; [IU]/1; MG/1; MG/1; MG/1; MG/1; MG/1
1 TABLET, FILM COATED ORAL EVERY MORNING
Status: DISCONTINUED | OUTPATIENT
Start: 2017-01-13 | End: 2017-01-15 | Stop reason: HOSPADM

## 2017-01-12 RX ORDER — KETOROLAC TROMETHAMINE 30 MG/ML
30 INJECTION, SOLUTION INTRAMUSCULAR; INTRAVENOUS EVERY 6 HOURS
Status: DISPENSED | OUTPATIENT
Start: 2017-01-12 | End: 2017-01-13

## 2017-01-12 RX ORDER — MISOPROSTOL 200 UG/1
1000 TABLET ORAL
Status: DISCONTINUED | OUTPATIENT
Start: 2017-01-12 | End: 2017-01-15 | Stop reason: HOSPADM

## 2017-01-12 RX ORDER — DOCUSATE SODIUM 100 MG/1
100 CAPSULE, LIQUID FILLED ORAL 2 TIMES DAILY PRN
Status: DISCONTINUED | OUTPATIENT
Start: 2017-01-12 | End: 2017-01-15 | Stop reason: HOSPADM

## 2017-01-12 RX ORDER — ONDANSETRON 2 MG/ML
4 INJECTION INTRAMUSCULAR; INTRAVENOUS EVERY 6 HOURS PRN
Status: DISCONTINUED | OUTPATIENT
Start: 2017-01-12 | End: 2017-01-13

## 2017-01-12 RX ORDER — ACETAMINOPHEN 325 MG/1
650 TABLET ORAL EVERY 4 HOURS PRN
Status: DISCONTINUED | OUTPATIENT
Start: 2017-01-12 | End: 2017-01-15 | Stop reason: HOSPADM

## 2017-01-12 RX ORDER — SIMETHICONE 80 MG
80 TABLET,CHEWABLE ORAL 4 TIMES DAILY PRN
Status: DISCONTINUED | OUTPATIENT
Start: 2017-01-12 | End: 2017-01-15 | Stop reason: HOSPADM

## 2017-01-12 RX ORDER — BISACODYL 10 MG
10 SUPPOSITORY, RECTAL RECTAL PRN
Status: DISCONTINUED | OUTPATIENT
Start: 2017-01-12 | End: 2017-01-15 | Stop reason: HOSPADM

## 2017-01-12 RX ORDER — OXYCODONE AND ACETAMINOPHEN 10; 325 MG/1; MG/1
1 TABLET ORAL EVERY 4 HOURS PRN
Status: DISCONTINUED | OUTPATIENT
Start: 2017-01-12 | End: 2017-01-15 | Stop reason: HOSPADM

## 2017-01-12 RX ORDER — DIPHENHYDRAMINE HYDROCHLORIDE 50 MG/ML
12.5 INJECTION INTRAMUSCULAR; INTRAVENOUS EVERY 6 HOURS PRN
Status: DISCONTINUED | OUTPATIENT
Start: 2017-01-12 | End: 2017-01-13

## 2017-01-12 RX ORDER — NALOXONE HYDROCHLORIDE 0.4 MG/ML
0.1 INJECTION, SOLUTION INTRAMUSCULAR; INTRAVENOUS; SUBCUTANEOUS PRN
Status: DISCONTINUED | OUTPATIENT
Start: 2017-01-12 | End: 2017-01-13

## 2017-01-12 RX ORDER — OXYCODONE HYDROCHLORIDE AND ACETAMINOPHEN 5; 325 MG/1; MG/1
1 TABLET ORAL EVERY 4 HOURS PRN
Status: DISCONTINUED | OUTPATIENT
Start: 2017-01-12 | End: 2017-01-13

## 2017-01-12 RX ADMIN — DOCUSATE SODIUM 100 MG: 100 CAPSULE ORAL at 04:10

## 2017-01-12 RX ADMIN — OXYCODONE HYDROCHLORIDE AND ACETAMINOPHEN 1 TABLET: 10; 325 TABLET ORAL at 17:17

## 2017-01-12 RX ADMIN — Medication 125 ML/HR: at 00:54

## 2017-01-12 RX ADMIN — KETOROLAC TROMETHAMINE 30 MG: 30 INJECTION, SOLUTION INTRAMUSCULAR; INTRAVENOUS at 12:27

## 2017-01-12 RX ADMIN — KETOROLAC TROMETHAMINE 30 MG: 30 INJECTION, SOLUTION INTRAMUSCULAR; INTRAVENOUS at 18:06

## 2017-01-12 RX ADMIN — OXYCODONE HYDROCHLORIDE AND ACETAMINOPHEN 1 TABLET: 10; 325 TABLET ORAL at 22:00

## 2017-01-12 RX ADMIN — SODIUM CHLORIDE, POTASSIUM CHLORIDE, SODIUM LACTATE AND CALCIUM CHLORIDE: 600; 310; 30; 20 INJECTION, SOLUTION INTRAVENOUS at 00:53

## 2017-01-12 RX ADMIN — DIPHENHYDRAMINE HYDROCHLORIDE 12.5 MG: 50 INJECTION INTRAMUSCULAR; INTRAVENOUS at 04:11

## 2017-01-12 RX ADMIN — KETOROLAC TROMETHAMINE 30 MG: 30 INJECTION, SOLUTION INTRAMUSCULAR; INTRAVENOUS at 06:42

## 2017-01-12 ASSESSMENT — PAIN SCALES - GENERAL
PAINLEVEL_OUTOF10: 0
PAINLEVEL_OUTOF10: 7
PAINLEVEL_OUTOF10: 5
PAINLEVEL_OUTOF10: 0
PAINLEVEL_OUTOF10: 5
PAINLEVEL_OUTOF10: 0
PAINLEVEL_OUTOF10: 2
PAINLEVEL_OUTOF10: 0

## 2017-01-12 NOTE — OP REPORT
DATE OF SERVICE:  2017    PREOPERATIVE DIAGNOSES:  1.  Intrauterine pregnancy at 37 and 3/7th weeks estimated gestational age.  2.  Mild preeclampsia.  3.  History of 3 previous  sections.    POSTOPERATIVE DIAGNOSES:  1.  Intrauterine pregnancy at 37 and 3/7th weeks estimated gestational age.  2.  Mild preeclampsia.  3.  History of 3 previous  sections.    PROCEDURE PERFORMED:  Repeat low-transverse  section via Pfannenstiel.    SURGEON:  Yu Prabhakar MD.    FIRST ASSISTANT:  SHANTAL Conrad.    ANESTHESIA:  Spinal.    COMPLICATIONS:  None.    ESTIMATED BLOOD LOSS:  800 mL.    FLUIDS:  1500 mL LR.    URINE OUTPUT:  150 mL clear urine.    INDICATIONS FOR PROCEDURE:  This is a 21-year-old female who is  4 para   3-0-0-2 at 37 and 3/7th weeks estimated gestational age who presented from   the [facility] for elevated blood pressures in the 130s-140s/90s with 2+   proteinuria on urine dip.  The patient was also complaining of headache for 2   days.  She was diagnosed with mild preeclampsia and the decision was made to   proceed with delivery.    FINDINGS:  Viable male infant in cephalic presentation, clear fluid, Apgars of   7 and 9.  Normal uterus, tubes and ovaries.  Dense adhesions involving the   anterior abdominal wall/rectus muscle and peritoneum with distorted anatomy   and landmarks.    PROCEDURE IN DETAIL:  The patient was taken to the operating room where final   anesthesia was found to be adequate.  She was then prepped and draped in the   normal sterile fashion in the dorsal supine position with the leftward hip   tilt.  A Pfannenstiel skin incision was made with the scalpel and carried down   to the underlying layer of the fascia, which was nicked in the midline and   the incision was extended laterally with the Rendon scissors.  The superior   aspect of the fascial incision was grasped with the Kocher clamps, elevated   and the underlying rectus muscle was dissected out  sharply.  Attention was   then turned to the inferior aspect of this incision, which in the similar   fashion was grasped, tented up with Kocher clamps and the rectus muscle was   dissected off sharply.  Of note, the rectus muscles were severely scarred and   adhesed to the fascia and the peritoneum and the surgical planes were   distorted.  The rectus muscles were adhesed to the underlying peritoneum and   omentum.  The rectus muscles were  in the midline.  The peritoneum   was identified, tented up and entered sharply with the Metzenbaum scissors.    The peritoneum was adhesed to the rectus muscles and then the adhesions were   taken down in layers and the incision was extended superiorly and inferiorly   with good visualization of the bladder.  The bladder blade was then inserted   and the vesicouterine peritoneum identified, grasped with pickups and entered   sharply with the Metzenbaum scissors.  The incision was extended laterally and   the bladder flap was created digitally.  The bladder blade was then   reinserted and the lower uterine segment was incised in a transverse fashion   with the scalpel.  The uterine incision was extended laterally digitally.  The   bladder blade was removed and the infant's head was noted to be deflexed.  A bell vacuum was applied to the infant's head to lift it out of the uterine incision.  The head was delivered atraumatically.    The nose and mouth were suctioned with the bulb suction and the cord was   clamped and cut.  The infant was handed off to the awaiting pediatrician.  The   placenta was then removed manually.  The uterus exteriorized and cleared of   all clot and debris.  The uterine incision was repaired with 1-0 chromic in a   running locked fashion.  A second layer of the same suture was used to obtain   excellent hemostasis.  A few additional figure-of-eight sutures of 1-0 chromic   were used for small bleeding areas and Patricia was applied to the  incision.    The gutters were cleared of all clot and debris.  The uterus was returned to   the abdomen.  The peritoneum was closed with 3-0 Vicryl.  The fascia was   reapproximated with Vicryl in a running fashion.  The subcuticular fat was   irrigated and Edi's fascia was closed with interrupted sutures of 2-0   Vicryl.  The skin was closed with staples.  The patient tolerated the   procedure well.  Sponge, lap, and needle counts were correct x3.  The patient   was taken to the recovery room in stable condition.       ____________________________________     Yu Prabhakar MD [facility] / [facility]EL    DD:  01/12/2017 00:39:30  DT:  01/12/2017 01:44:04    D#:  [id number]  Job#:  [id number]

## 2017-01-12 NOTE — H&P
History and Physical      Earnestine Knight is a 21 y.o. year old female  at 37w3d who presents from [facility] for elevated BPs 130-140s/90s with 2 + proteinuria.  Pt c/o headache for two days.  No vision changes.  No increase in edema.    Subjective:   negative  For CTXS.   negative Feels pain   negative for LOF  positive for vaginal bleeding.   positive for fetal movement    ROS: Pertinent items are noted in HPI.    Past Medical History   Diagnosis Date   • Depression    • Anxiety    • History of  x 3 needs another csections 2016   • SIDS (sudden infant death syndrome) with last baby at 6 weeks of age 2016   • Tobacco dependence says cut down to 5 cigarettes/day 2016     Past Surgical History   Procedure Laterality Date   • Primary c section     • Repeat c section     • Repeat c section  11/3/2015     Procedure: REPEAT C SECTION;  Surgeon: Ricky Vega M.D.;  Location: LABOR AND DELIVERY;  Service:      OB History    Para Term  AB SAB TAB Ectopic Multiple Living   4 3 3       2      # Outcome Date GA Lbr Ramin/2nd Weight Sex Delivery Anes PTL Lv   4 Current            3 Term 09/08/15 40w0d  3.827 kg (8 lb 7 oz) M CS-Unspec Spinal N FD      Comments: SIDS   2 Term 2014 39w0d  3.345 kg (7 lb 6 oz) F CS-LTranv Spinal  Y   1 Term 13 40w0d  4.706 kg (10 lb 6 oz) F CS-LTranv EPI N Y      Complications: Failure to Progress in Second Stage        Social History     Social History   • Marital Status: Single     Spouse Name: N/A   • Number of Children: N/A   • Years of Education: N/A     Occupational History   • Not on file.     Social History Main Topics   • Smoking status: Current Every Day Smoker -- 0.50 packs/day for 8 years     Types: Cigarettes   • Smokeless tobacco: Never Used   • Alcohol Use: No   • Drug Use: No   • Sexual Activity:     Partners: Male      Comment: no birth control     Other Topics Concern   • Not on file     Social History Narrative  "    Allergies: Review of patient's allergies indicates no known allergies.    Current facility-administered medications:   •  lactated ringers infusion, , Intravenous, Continuous, Yu Prabhakar M.D., Last Rate: 125 mL/hr at 17  •  oxytocin (PITOCIN) 20 UNITS/1000ML LR, , , ,     Prenatal care with [facility] starting at 4 weeks with following problems:  Patient Active Problem List    Diagnosis Date Noted   • History of  x 3 needs another csections 2016   • SIDS (sudden infant death syndrome) with last baby at 6 weeks of age 2016   • Tobacco dependence says cut down to 5 cigarettes/day 2016   • Pregnancy 2015   • Supervision of other high risk pregnancy, antepartum 10/15/2015               Objective:      Blood pressure 154/111, pulse 82, temperature 36.3 °C (97.4 °F), height 1.6 m (5' 3\"), weight 113.399 kg (250 lb), unknown if currently breastfeeding.    General:   no acute distress   Skin:   normal   HEENT:  PERRLA   Lungs:   CTA bilateral   Heart:   S1, S2 normal, no murmur, click, rub or gallop, regular rate and rhythm, brisk carotid upstroke without bruits, peripheral pulses very brisk, chest is clear without rales or wheezing, no pedal edema, no JVD, no hepatosplenomegaly   Abdomen:   gravid, NT   EFW:  9 lbs   Pelvis:  adequate with gynecoid pelvis   FHT:  140s BPM, moderate variability, + accels   Uterine Size: S=D   Presentations: Cephalic   Cervix:     Dilation: deferred                         Lab Review  Lab:   Blood type: O     Recent Results (from the past 5880 hour(s))   POCT Pregnancy    Collection Time: 16 10:40 AM   Result Value Ref Range    POC Urine Pregnancy Test Positive Negative    Internal Control Positive Positive     Internal Control Negative Negative    POCT IN CLINIC OB SCAN    Collection Time: 16  2:09 PM   Result Value Ref Range    In Clinic OB Scan     POCT US - In Clinic OB Scan    Collection Time: 16  8:34 AM   Result Value Ref " Range    In Clinic OB Scan     POCT Urinalysis    Collection Time: 07/19/16 10:18 AM   Result Value Ref Range    POC Color  Negative    POC Appearance  Negative    POC Leukocyte Esterase neg Negative    POC Nitrites neg Negative    POC Urobiligen  Negative (0.2) mg/dL    POC Protein neg Negative mg/dL    POC Urine PH 6.5 5.0 - 8.0    POC Blood neg Negative    POC Specific Gravity 1.015 <1.005 - >1.030    POC Ketones neg Negative mg/dL    POC Biliruben  Negative mg/dL    POC Glucose neg Negative mg/dL   GC DNA PROBE    Collection Time: 07/21/16 12:00 AM   Result Value Ref Range    Gc By Dna Probe Negative    CHLAMYDIA DNA PROBE    Collection Time: 07/21/16 12:00 AM   Result Value Ref Range    Chlamydia By Dna Probe Negative    PAP IG, RFX HPV ASCU    Collection Time: 07/21/16 12:00 AM   Result Value Ref Range    Physician Read Pap Negative for Intraepithelial lesion or malignancy    FETAL FIBRONECTIN    Collection Time: 11/17/16 10:30 AM   Result Value Ref Range    Fetal Fibronectin Negative    AMNISURE ROM ASSAY    Collection Time: 11/17/16 10:30 AM   Result Value Ref Range    AmniSure ROM Negative Negative   ABO AND RH DETERMINATION    Collection Time: 11/25/16 12:00 AM   Result Value Ref Range    Rh Grouping Only POS     ABO Grouping Only O    ANTIBODY SCREEN    Collection Time: 11/25/16 12:00 AM   Result Value Ref Range    Antibody Screen Scrn NEG    GLUCOSE 1HR GESTATIONAL    Collection Time: 11/25/16 12:00 AM   Result Value Ref Range    Glucose, Post Dose 125  mg/dL    PLATELET COUNT    Collection Time: 11/25/16 12:00 AM   Result Value Ref Range    Platelet Count 188  k/uL    RUBELLA ABS IGG    Collection Time: 11/25/16 12:00 AM   Result Value Ref Range    Rubella IgG Antibody IMMUNE    RPR QUAL W/REFLEX    Collection Time: 11/25/16 12:00 AM   Result Value Ref Range    Rapid Plasma Reagin -Rpr- NON REACTIVE    HCT    Collection Time: 11/25/16 12:00 AM   Result Value Ref Range    Hematocrit 37.5  %    HGB     Collection Time: 11/25/16 12:00 AM   Result Value Ref Range    Hemoglobin 12.5  g/dL    HIV ANTIBODIES    Collection Time: 11/25/16 12:00 AM   Result Value Ref Range    HIV 1,0,2 IC NON REACTIVE    HEP B SURFACE ANTIGEN    Collection Time: 11/25/16 12:00 AM   Result Value Ref Range    Hepatitis B Surface Antigen NEG    POCT Urinalysis    Collection Time: 01/05/17  1:57 PM   Result Value Ref Range    POC Color  Negative    POC Appearance  Negative    POC Leukocyte Esterase trace Negative    POC Nitrites neg Negative    POC Urobiligen  Negative (0.2) mg/dL    POC Protein 100 Negative mg/dL    POC Urine PH 6.0 5.0 - 8.0    POC Blood neg Negative    POC Specific Gravity 1.020 <1.005 - >1.030    POC Ketones neg Negative mg/dL    POC Biliruben  Negative mg/dL    POC Glucose neg Negative mg/dL   GRP B STREP, BY PCR (DOYLE BROTH)    Collection Time: 01/10/17 12:00 AM   Result Value Ref Range    Strep Gp B DNA PCR NEG    POC UA    Collection Time: 01/10/17  2:07 PM   Result Value Ref Range    POC Color Yellow     POC Appearance Cloudy (A)     POC Glucose Negative Negative mg/dL    POC Ketones Negative Negative mg/dL    POC Specific Gravity 1.015 1.005-1.030    POC Blood Negative Negative    POC Urine PH 6.5 5.0-8.0    POC Protein Negative Negative mg/dL    POC Nitrites Negative Negative    POC Leukocyte Esterase Small (A) Negative   INFLUENZA BY PCR, A/B/H1N1    Collection Time: 01/10/17  2:30 PM   Result Value Ref Range    Influenza virus A RNA Negative Negative    Influenza virus B RNA Negative Negative    Influenza A 2009, H1N1, PCR Not Detected Negative   COMP METABOLIC PANEL    Collection Time: 01/10/17  3:23 PM   Result Value Ref Range    Sodium 134 (L) 135 - 145 mmol/L    Potassium 3.8 3.6 - 5.5 mmol/L    Chloride 107 96 - 112 mmol/L    Co2 19 (L) 20 - 33 mmol/L    Anion Gap 8.0 0.0 - 11.9    Glucose 96 65 - 99 mg/dL    Bun 6 (L) 8 - 22 mg/dL    Creatinine 0.50 0.50 - 1.40 mg/dL    Calcium 8.4 (L) 8.5 - 10.5 mg/dL     AST(SGOT) 14 12 - 45 U/L    ALT(SGPT) 8 2 - 50 U/L    Alkaline Phosphatase 154 (H) 30 - 99 U/L    Total Bilirubin 0.3 0.1 - 1.5 mg/dL    Albumin 2.9 (L) 3.2 - 4.9 g/dL    Total Protein 5.6 (L) 6.0 - 8.2 g/dL    Globulin 2.7 1.9 - 3.5 g/dL    A-G Ratio 1.1 g/dL   CBC WITH DIFFERENTIAL    Collection Time: 01/10/17  3:23 PM   Result Value Ref Range    WBC 11.4 (H) 4.8 - 10.8 K/uL    RBC 4.09 (L) 4.20 - 5.40 M/uL    Hemoglobin 11.4 (L) 12.0 - 16.0 g/dL    Hematocrit 34.5 (L) 37.0 - 47.0 %    MCV 84.4 81.4 - 97.8 fL    MCH 27.9 27.0 - 33.0 pg    MCHC 33.0 (L) 33.6 - 35.0 g/dL    RDW 46.2 35.9 - 50.0 fL    Platelet Count 155 (L) 164 - 446 K/uL    MPV 11.2 9.0 - 12.9 fL    Neutrophils-Polys 79.80 (H) 44.00 - 72.00 %    Lymphocytes 11.20 (L) 22.00 - 41.00 %    Monocytes 7.10 0.00 - 13.40 %    Eosinophils 0.90 0.00 - 6.90 %    Basophils 0.40 0.00 - 1.80 %    Immature Granulocytes 0.60 0.00 - 0.90 %    Nucleated RBC 0.00 /100 WBC    Neutrophils (Absolute) 9.11 (H) 2.00 - 7.15 K/uL    Lymphs (Absolute) 1.28 1.00 - 4.80 K/uL    Monos (Absolute) 0.81 0.00 - 0.85 K/uL    Eos (Absolute) 0.10 0.00 - 0.51 K/uL    Baso (Absolute) 0.04 0.00 - 0.12 K/uL    Immature Granulocytes (abs) 0.07 0.00 - 0.11 K/uL    NRBC (Absolute) 0.00 K/uL   URIC ACID    Collection Time: 01/10/17  3:23 PM   Result Value Ref Range    Uric Acid 5.6 1.9 - 8.2 mg/dL   ESTIMATED GFR    Collection Time: 01/10/17  3:23 PM   Result Value Ref Range    GFR If African American >60 >60 mL/min/1.73 m 2    GFR If Non African American >60 >60 mL/min/1.73 m 2   POCT Fetal Nonstress Test    Collection Time: 01/11/17  1:45 PM   Result Value Ref Range    NST Indications elevated bp     NST Baseline 130     NST Uterine Activity none     NST Acoustic Stimulation none     NST Assessment       category one, pos accels, no decels, moderate variability    NST Action Necessary      NST Other Data bp on [id number], 127/87, 147/84     NST Return      NST Read By     POCT Urinalysis     Collection Time: 01/11/17  2:11 PM   Result Value Ref Range    POC Color  Negative    POC Appearance  Negative    POC Leukocyte Esterase TRACE Negative    POC Nitrites NEG Negative    POC Urobiligen  Negative (0.2) mg/dL    POC Protein 100 Negative mg/dL    POC Urine PH 6.0 5.0 - 8.0    POC Blood NEG Negative    POC Specific Gravity 1.020 <1.005 - >1.030    POC Ketones SMALL Negative mg/dL    POC Biliruben  Negative mg/dL    POC Glucose NEG Negative mg/dL   CBC WITH DIFFERENTIAL    Collection Time: 01/11/17  4:09 PM   Result Value Ref Range    WBC 9.0 4.8 - 10.8 K/uL    RBC 4.13 (L) 4.20 - 5.40 M/uL    Hemoglobin 11.9 (L) 12.0 - 16.0 g/dL    Hematocrit 35.1 (L) 37.0 - 47.0 %    MCV 85.0 81.4 - 97.8 fL    MCH 28.8 27.0 - 33.0 pg    MCHC 33.9 33.6 - 35.0 g/dL    RDW 46.9 35.9 - 50.0 fL    Platelet Count 156 (L) 164 - 446 K/uL    MPV 10.7 9.0 - 12.9 fL    Neutrophils-Polys 71.30 44.00 - 72.00 %    Lymphocytes 17.00 (L) 22.00 - 41.00 %    Monocytes 7.80 0.00 - 13.40 %    Eosinophils 2.60 0.00 - 6.90 %    Basophils 0.60 0.00 - 1.80 %    Immature Granulocytes 0.70 0.00 - 0.90 %    Nucleated RBC 0.00 /100 WBC    Neutrophils (Absolute) 6.44 2.00 - 7.15 K/uL    Lymphs (Absolute) 1.53 1.00 - 4.80 K/uL    Monos (Absolute) 0.70 0.00 - 0.85 K/uL    Eos (Absolute) 0.23 0.00 - 0.51 K/uL    Baso (Absolute) 0.05 0.00 - 0.12 K/uL    Immature Granulocytes (abs) 0.06 0.00 - 0.11 K/uL    NRBC (Absolute) 0.00 K/uL   COMP METABOLIC PANEL    Collection Time: 01/11/17  4:09 PM   Result Value Ref Range    Sodium 136 135 - 145 mmol/L    Potassium 3.6 3.6 - 5.5 mmol/L    Chloride 108 96 - 112 mmol/L    Co2 19 (L) 20 - 33 mmol/L    Anion Gap 9.0 0.0 - 11.9    Glucose 127 (H) 65 - 99 mg/dL    Bun 9 8 - 22 mg/dL    Creatinine 0.53 0.50 - 1.40 mg/dL    Calcium 8.8 8.5 - 10.5 mg/dL    AST(SGOT) 12 12 - 45 U/L    ALT(SGPT) 6 2 - 50 U/L    Alkaline Phosphatase 157 (H) 30 - 99 U/L    Total Bilirubin 0.3 0.1 - 1.5 mg/dL    Albumin 3.0 (L) 3.2 - 4.9  g/dL    Total Protein 5.8 (L) 6.0 - 8.2 g/dL    Globulin 2.8 1.9 - 3.5 g/dL    A-G Ratio 1.1 g/dL   URIC ACID    Collection Time: 17  4:09 PM   Result Value Ref Range    Uric Acid 5.4 1.9 - 8.2 mg/dL   ESTIMATED GFR    Collection Time: 17  4:09 PM   Result Value Ref Range    GFR If African American >60 >60 mL/min/1.73 m 2    GFR If Non African American >60 >60 mL/min/1.73 m 2   HOLD BLOOD BANK SPECIMEN (NOT TESTED)    Collection Time: 17  4:09 PM   Result Value Ref Range    Holding Tube - Bb DONE    URINALYSIS    Collection Time: 17  4:10 PM   Result Value Ref Range    Micro Urine Req Microscopic     Color Yellow     Character Sl Cloudy (A)     Specific Gravity 1.026 <1.035    Ph 6.0 5.0-8.0    Glucose Negative Negative mg/dL    Ketones Trace (A) Negative mg/dL    Protein 50 (A) Negative mg/dL    Bilirubin Negative Negative    Nitrite Negative Negative    Leukocyte Esterase Trace (A) Negative    Occult Blood Negative Negative   URINE MICROSCOPIC (W/UA)    Collection Time: 17  4:10 PM   Result Value Ref Range    WBC 0-2 /hpf    RBC 0-2 /hpf    Bacteria Many (A) None /hpf    Epithelial Cells Few /hpf    Trans Epithelial Cells Few /hpf    Mucous Threads Few /hpf    Ca Oxalate Crystal Moderate /hpf        Assessment:   Earnestine Knight at 37w3d with elevated BPs and proteinuria consistent with mild pre-eclampsia.  Pt with hx of 3 previous C/S.    Discussed with the patient indications for  delivery. The patient voiced understanding of indications for  section at this time.    Discussed with the patient the risks of  delivery. The risks include bleeding, infection, transfusion, emergency hysterectomy to control bleeding, damage to surrounding organs (bowel, bladder, ureters, nerves, vessels), need for repair or future surgery, fetal injury, unexpected pathology, anesthesia risks, and rarely death.  I also discussed with the patient the risk of wound infection,  wound breakdown, and scarring. We discussed that these risks are greater in people that have diabetes or obesity.    The patient had the opportunity to ask questions regarding the procedure. All questions answered to the patient's satisfaction. Plan to proceed with  delivery.     Labor status: Not in labor.  Obstetrical history significant for   Patient Active Problem List    Diagnosis Date Noted   • History of  x 3 needs another csections 2016   • SIDS (sudden infant death syndrome) with last baby at 6 weeks of age 2016   • Tobacco dependence says cut down to 5 cigarettes/day 2016   • Pregnancy 2015   • Supervision of other high risk pregnancy, antepartum 10/15/2015   .      Plan:     Admit to L&D  GBS negative  PIH labs - normal  Plan for repeat C/S due to mild pre-eclampsia at term. Pt declines BTL.

## 2017-01-12 NOTE — CARE PLAN
Problem: Safety  Goal: Will remain free from injury  Outcome: PROGRESSING AS EXPECTED  Patient oriented to room. Call light within reach, patient encouraged to call when in need of assistance. Educated on when to pull emergency call light.     Problem: Alteration in comfort related to surgical incision and/or after birth pains  Goal: Patient verbalizes acceptable pain level  Outcome: PROGRESSING AS EXPECTED  Patient will call when needing pain medication. Patient has not had pain medication since coming to [facility], states she just needs to sleep.

## 2017-01-12 NOTE — OR SURGEON
Immediate Post-Operative Note      PreOp Diagnosis: IUP @ 37 +3/7 wks with 3 previous C/S and mild pre-eclampsia.    PostOp Diagnosis: Same    Procedure(s):  REPEAT C SECTION    Surgeon(s):  Yu Prabhakar M.D.    Anesthesiologist/Type of Anesthesia:  Anesthesiologist: Norbert Kendrick M.D./Spinal    Surgical Staff:  Circulator: Pooja Pandya R.N.  Scrub Person: Candi Teixeira  L&MAE Baby  Nurse: Jenn Cerna R.N.    Specimen: None    Estimated Blood Loss: 800 cc    Findings: viable male infant in cephalic presentation, clear fluid, apgars 7 and 9.  Normal uterus, tubes ,and ovaries.    Complications: None        1/12/2017 12:06 AM Yu Prabhakar

## 2017-01-12 NOTE — DISCHARGE PLANNING
:    Referral: History of anxiety and depression.  MAVERICK had a baby that  from SIDS at 6 weeks.    Intervention:  Reviewed medical record and met with MAVERICK, Earnestinealan Knight who delivered her fourth baby.  Mother has two daughters: 4 and 2.  He son  a year ago from SIDS at 6 weeks old.      Verified MAVERICK's phone number and address which is [address] [place] [address].  Phone number is [phone].  MAVERICK states she has good family support.  She is prepared for infant except for a diaper bag.  Provided MOB with a list of children's resources, pediatrician list, diaper bank referral, and a bag of infant supplies.      MAVERICK denies any current symptoms of anxiety or depression and plans to follow up with her physician.  MAVERICK is considering having an apnea monitor for discharge because her son  at home from SIDS.  SW will assist with getting the apnea monitor if ordered by MD.    Plan:  Resources provided.  Will assist with apnea monitor if ordered.  Infant is cleared to discharge home with mother when ready.

## 2017-01-12 NOTE — CARE PLAN
Problem: Communication  Goal: The ability to communicate needs accurately and effectively will improve  Outcome: PROGRESSING AS EXPECTED  Reviewed plan of care with patient who verbalized understanding.    Problem: Altered physiologic condition related to postoperative  delivery  Goal: Patient physiologically stable as evidenced by normal lochia, palpable uterine involution and vital signs within normal limits  Fundus firm.  Lochia scant.  VSS.    Problem: Potential for postpartum infection related to surgical incision, compromised uterine condition, urinary tract or respiratory compromise  Goal: Patient will be afebrile and free from signs and symptoms of infection  Patient is afebrile.

## 2017-01-12 NOTE — PROGRESS NOTES
Post Partum Progress Note    Name:   Earnestine Knight   Date/Time:  2017 - 5:35 AM  Chief Admitting Dx:  Pregnancy  Labor and delivery indication for care or intervention  PIH. hx of previous c section  Elevated blood pressures  Delivery Type:   for PIH  Post-Op/Post Partum Days #:  1    Subjective:  Abdominal pain: no  Ambulating:   yes  Tolerating liquids:  yes  Tolerating food:  No  Flatus:   no  BM:    no  Bleeding:   without any bleeding  Voiding:   Yes with timmons cath in place  Dizziness:   no  Feeding:   No attempts yet    Filed Vitals:    17 0115 17 0215 17 0300 17 0400   BP: 147/91 146/98 141/96    Pulse: 78 70 62 79   Temp: 36.2 °C (97.2 °F) 36.2 °C (97.2 °F) 36.4 °C (97.6 °F)    Resp:   20 20   Height:       Weight:       SpO2:  95% 95% 94%       Exam:  Breast: Tenderness no, Engorged no and Lactating no  Abdomen: Abdomen soft, non-tender. BS normal. No masses,  No organomegaly  Fundal Tenderness:  no  Fundus Firm: yes  Incision: dry and intact  Below umbilicus: yes  Perineum: perineum intact  Lochia: mild  Extremities: Normal extremities, peripheral pulses and reflexes normal    Meds:  Current Facility-Administered Medications   Medication Dose   • LR infusion     • oxytocin (PITOCIN) infusion (for postpartum)   mL/hr   • misoprostol (CYTOTEC) tablet 1,000 mcg  1,000 mcg   • docusate sodium (COLACE) capsule 100 mg  100 mg   • bisacodyl (DULCOLAX) suppository 10 mg  10 mg   • simethicone (MYLICON) chewable tab 80 mg  80 mg   • [START ON 2017] prenatal plus vitamin (STUARTNATAL 1+1) 27-1 MG tablet 1 Tab  1 Tab   • acetaminophen (TYLENOL) tablet 650 mg  650 mg   • naloxone (NARCAN) injection 0.1 mg  0.1 mg   • ketorolac (TORADOL) injection 30 mg  30 mg   • oxycodone-acetaminophen (PERCOCET) 5-325 MG per tablet 1 Tab  1 Tab   • ephedrine injection 10 mg  10 mg   • ondansetron (ZOFRAN) syringe/vial injection 4 mg  4 mg   • diphenhydrAMINE (BENADRYL)  injection 12.5 mg  12.5 mg   • oxycodone-acetaminophen (PERCOCET-10)  MG per tablet 1 Tab  1 Tab   • oxytocin (PITOCIN) infusion (for postpartum)   mL/hr       Labs:   Recent Labs      01/10/17   1523  17   1609   WBC  11.4*  9.0   RBC  4.09*  4.13*   HEMOGLOBIN  11.4*  11.9*   HEMATOCRIT  34.5*  35.1*   MCV  84.4  85.0   MCH  27.9  28.8   MCHC  33.0*  33.9   RDW  46.2  46.9   PLATELETCT  155*  156*   MPV  11.2  10.7       Assessment:  Chief Admitting Dx:  Pregnancy  Labor and delivery indication for care or intervention  PIH. hx of previous c section  Elevated blood pressures  Delivery Type:   for PIH  Tubal Ligation:  no    Plan:  Continue routine post partum care. Patient still has sequentials in place, BP's running 140's over 90's. Justice still in place. Up to ambulate this a.m. Encourage breast and bonding.    FABIOLA FitzgeraldP.

## 2017-01-12 NOTE — PROGRESS NOTES
0235: Pt arrived to room 335 via gurney. Report received from NATALIA Patton. Bands and cuddles verified. Pt rates pain 5/10. States she will call when needing pain medication. IV Patent, running second bag of pitocin at 125 cc/hr. SCD's on, timmons draining to gravity and pulse oximeter on. Patient assessed, fundus firm with light lochia.  POC discussed. Pt oriented to room,skylight, whiteboards updated. Call light/[employer] call light use  and pink badges discussed. Call light within reach. Patient encouraged to call with any needs and or concerns.

## 2017-01-12 NOTE — PROGRESS NOTES
Patient PIH workup.she is due 1.29 which makes her 37.3weeks.PI labs ordered.1610 [facility] labs drawn.

## 2017-01-12 NOTE — PROGRESS NOTES
0780- Bedside report received from NATALIA Keyes.  Patient denied needs.  Patient assessment done.  IV saline locked.  Indwelling catheter to gravity with clear urine.  Sequential stockings on.  Abdominal dressing is clean, dry, and intact.  Patient denied dizziness.  Discussed pain management plan.  Patient notified that at this time there is an order for toradol IV every 6 hours.  Patient stated she will call if she needs any other pain medications as needed.  Reviewed plan of care.  1110- Patient up to sit at bedside and tolerated well.  Patient up to bathroom with standby assist.  Mikki-care and catheter care done.  Patient up to sink to wash hands and then back to bed.  Patient tolerated well.

## 2017-01-13 LAB
ERYTHROCYTE [DISTWIDTH] IN BLOOD BY AUTOMATED COUNT: 48.3 FL (ref 35.9–50)
HCT VFR BLD AUTO: 31 % (ref 37–47)
HGB BLD-MCNC: 10 G/DL (ref 12–16)
MCH RBC QN AUTO: 27.8 PG (ref 27–33)
MCHC RBC AUTO-ENTMCNC: 32.3 G/DL (ref 33.6–35)
MCV RBC AUTO: 86.1 FL (ref 81.4–97.8)
PLATELET # BLD AUTO: 133 K/UL (ref 164–446)
PMV BLD AUTO: 10.9 FL (ref 9–12.9)
RBC # BLD AUTO: 3.6 M/UL (ref 4.2–5.4)
WBC # BLD AUTO: 9.5 K/UL (ref 4.8–10.8)

## 2017-01-13 PROCEDURE — A9270 NON-COVERED ITEM OR SERVICE: HCPCS | Performed by: NURSE PRACTITIONER

## 2017-01-13 PROCEDURE — A9270 NON-COVERED ITEM OR SERVICE: HCPCS | Performed by: ANESTHESIOLOGY

## 2017-01-13 PROCEDURE — 700102 HCHG RX REV CODE 250 W/ 637 OVERRIDE(OP): Performed by: ANESTHESIOLOGY

## 2017-01-13 PROCEDURE — 700102 HCHG RX REV CODE 250 W/ 637 OVERRIDE(OP): Performed by: OBSTETRICS & GYNECOLOGY

## 2017-01-13 PROCEDURE — 85027 COMPLETE CBC AUTOMATED: CPT

## 2017-01-13 PROCEDURE — 700102 HCHG RX REV CODE 250 W/ 637 OVERRIDE(OP): Performed by: NURSE PRACTITIONER

## 2017-01-13 PROCEDURE — 36415 COLL VENOUS BLD VENIPUNCTURE: CPT

## 2017-01-13 PROCEDURE — 770007 HCHG ROOM/CARE - OB SEMI PRIVATE (*

## 2017-01-13 PROCEDURE — A9270 NON-COVERED ITEM OR SERVICE: HCPCS | Performed by: OBSTETRICS & GYNECOLOGY

## 2017-01-13 RX ORDER — OXYCODONE AND ACETAMINOPHEN 10; 325 MG/1; MG/1
1 TABLET ORAL EVERY 4 HOURS PRN
Status: DISCONTINUED | OUTPATIENT
Start: 2017-01-13 | End: 2017-01-15 | Stop reason: HOSPADM

## 2017-01-13 RX ORDER — OXYCODONE HYDROCHLORIDE AND ACETAMINOPHEN 5; 325 MG/1; MG/1
1 TABLET ORAL EVERY 4 HOURS PRN
Status: DISCONTINUED | OUTPATIENT
Start: 2017-01-13 | End: 2017-01-15 | Stop reason: HOSPADM

## 2017-01-13 RX ORDER — ONDANSETRON 2 MG/ML
4 INJECTION INTRAMUSCULAR; INTRAVENOUS EVERY 6 HOURS PRN
Status: DISCONTINUED | OUTPATIENT
Start: 2017-01-13 | End: 2017-01-15 | Stop reason: HOSPADM

## 2017-01-13 RX ORDER — MORPHINE SULFATE 10 MG/ML
5 INJECTION, SOLUTION INTRAMUSCULAR; INTRAVENOUS
Status: DISCONTINUED | OUTPATIENT
Start: 2017-01-13 | End: 2017-01-15 | Stop reason: HOSPADM

## 2017-01-13 RX ORDER — IBUPROFEN 600 MG/1
600 TABLET ORAL EVERY 6 HOURS PRN
Status: DISCONTINUED | OUTPATIENT
Start: 2017-01-13 | End: 2017-01-15 | Stop reason: HOSPADM

## 2017-01-13 RX ORDER — KETOROLAC TROMETHAMINE 30 MG/ML
30 INJECTION, SOLUTION INTRAMUSCULAR; INTRAVENOUS EVERY 6 HOURS
Status: ACTIVE | OUTPATIENT
Start: 2017-01-13 | End: 2017-01-13

## 2017-01-13 RX ORDER — MORPHINE SULFATE 10 MG/ML
10 INJECTION, SOLUTION INTRAMUSCULAR; INTRAVENOUS
Status: DISCONTINUED | OUTPATIENT
Start: 2017-01-13 | End: 2017-01-15 | Stop reason: HOSPADM

## 2017-01-13 RX ADMIN — IBUPROFEN 600 MG: 600 TABLET, FILM COATED ORAL at 22:51

## 2017-01-13 RX ADMIN — IBUPROFEN 600 MG: 600 TABLET, FILM COATED ORAL at 15:57

## 2017-01-13 RX ADMIN — OXYCODONE HYDROCHLORIDE AND ACETAMINOPHEN 1 TABLET: 10; 325 TABLET ORAL at 06:13

## 2017-01-13 RX ADMIN — Medication 1 TABLET: at 08:48

## 2017-01-13 RX ADMIN — OXYCODONE HYDROCHLORIDE AND ACETAMINOPHEN 1 TABLET: 10; 325 TABLET ORAL at 15:57

## 2017-01-13 RX ADMIN — OXYCODONE HYDROCHLORIDE AND ACETAMINOPHEN 1 TABLET: 10; 325 TABLET ORAL at 22:51

## 2017-01-13 RX ADMIN — OXYCODONE HYDROCHLORIDE AND ACETAMINOPHEN 1 TABLET: 10; 325 TABLET ORAL at 11:23

## 2017-01-13 ASSESSMENT — PATIENT HEALTH QUESTIONNAIRE - PHQ9
SUM OF ALL RESPONSES TO PHQ QUESTIONS 1-9: 0
1. LITTLE INTEREST OR PLEASURE IN DOING THINGS: NOT AT ALL
SUM OF ALL RESPONSES TO PHQ9 QUESTIONS 1 AND 2: 0

## 2017-01-13 ASSESSMENT — PAIN SCALES - GENERAL
PAINLEVEL_OUTOF10: 1
PAINLEVEL_OUTOF10: 0
PAINLEVEL_OUTOF10: 2
PAINLEVEL_OUTOF10: 0

## 2017-01-13 ASSESSMENT — LIFESTYLE VARIABLES: DO YOU DRINK ALCOHOL: NO

## 2017-01-13 NOTE — PROGRESS NOTES
Post Partum Progress Note    Name:   Earnestine Knight   Date/Time:  2017 - 5:37 AM  Chief Admitting Dx:  Pregnancy  Labor and delivery indication for care or intervention  PIH. hx of previous c section  Elevated blood pressures  Delivery Type:   for repeat  Post-Op/Post Partum Days #:  2    Subjective:  Abdominal pain: no  Ambulating:   yes  Tolerating liquids:  yes  Tolerating food:  yes common adult  Flatus:   yes  BM:    no  Bleeding:   without any bleeding  Voiding:   yes  Dizziness:   no  Feeding:   bottle - Similac with iron    Filed Vitals:    17 1400 17 1515 17 1620 17   BP:   118/73 127/92   Pulse: 91 85 73 81   Temp:   36.5 °C (97.7 °F) 36.5 °C (97.7 °F)   Resp: 18 20 18 20   Height:       Weight:       SpO2: 94% 92% 96% 94%       Exam:  Breast: Tenderness no  Abdomen: Abdomen soft, non-tender. BS normal. No masses,  No organomegaly  Fundal Tenderness:  no  Fundus Firm: yes  Incision: dry and intact  Below umbilicus: yes  Perineum: perineum intact  Lochia: mild  Extremities: Normal extremities, peripheral pulses and reflexes normal    Meds:  Current Facility-Administered Medications   Medication Dose   • ketorolac (TORADOL) injection 30 mg  30 mg   • oxycodone-acetaminophen (PERCOCET) 5-325 MG per tablet 1 Tab  1 Tab   • morphine (pf) 10 mg/ml injection 5 mg  5 mg   • oxycodone-acetaminophen (PERCOCET-10)  MG per tablet 1 Tab  1 Tab   • morphine (pf) 10 mg/ml injection 10 mg  10 mg   • ondansetron (ZOFRAN) syringe/vial injection 4 mg  4 mg   • LR infusion     • oxytocin (PITOCIN) infusion (for postpartum)   mL/hr   • misoprostol (CYTOTEC) tablet 1,000 mcg  1,000 mcg   • docusate sodium (COLACE) capsule 100 mg  100 mg   • bisacodyl (DULCOLAX) suppository 10 mg  10 mg   • simethicone (MYLICON) chewable tab 80 mg  80 mg   • prenatal plus vitamin (STUARTNATAL 1+1) 27-1 MG tablet 1 Tab  1 Tab   • acetaminophen (TYLENOL) tablet 650 mg  650 mg   •  ketorolac (TORADOL) injection 30 mg  30 mg   • oxycodone-acetaminophen (PERCOCET-10)  MG per tablet 1 Tab  1 Tab   • oxytocin (PITOCIN) infusion (for postpartum)   mL/hr       Labs:   Recent Labs      01/10/17   1523  17   1609   WBC  11.4*  9.0   RBC  4.09*  4.13*   HEMOGLOBIN  11.4*  11.9*   HEMATOCRIT  34.5*  35.1*   MCV  84.4  85.0   MCH  27.9  28.8   MCHC  33.0*  33.9   RDW  46.2  46.9   PLATELETCT  155*  156*   MPV  11.2  10.7       Assessment:  Chief Admitting Dx:  Pregnancy  Labor and delivery indication for care or intervention  PIH. hx of previous c section  Elevated blood pressures  Delivery Type:   for repeat  Tubal Ligation:  no    Plan:  Continue routine post partum care.  Advance care, encourage hydration, ambulation and pain management  Anticipate discharge on POD#3    CHARITO Foley.

## 2017-01-13 NOTE — PROGRESS NOTES
Assumed care. Assessment completed. Fundus firm. Lochia light. ABD incision with dressing dry, intact. Patient will call if pain management needed.

## 2017-01-13 NOTE — PROGRESS NOTES
Baby back to NBN while mother sleeps both baby and mother stable pt up independently amb with no issues pain meds given as needed.

## 2017-01-13 NOTE — CARE PLAN
Problem: Altered physiologic condition related to postoperative  delivery  Goal: Patient physiologically stable as evidenced by normal lochia, palpable uterine involution and vital signs within normal limits  Outcome: PROGRESSING AS EXPECTED  Fundus firm, lochia light    Problem: Potential for postpartum infection related to surgical incision, compromised uterine condition, urinary tract or respiratory compromise  Goal: Patient will be afebrile and free from signs and symptoms of infection  Outcome: PROGRESSING AS EXPECTED  VSS. No signs or symptoms of infection.

## 2017-01-14 PROCEDURE — 770007 HCHG ROOM/CARE - OB SEMI PRIVATE (*

## 2017-01-14 PROCEDURE — 700102 HCHG RX REV CODE 250 W/ 637 OVERRIDE(OP): Performed by: FAMILY MEDICINE

## 2017-01-14 PROCEDURE — A9270 NON-COVERED ITEM OR SERVICE: HCPCS | Performed by: NURSE PRACTITIONER

## 2017-01-14 PROCEDURE — A9270 NON-COVERED ITEM OR SERVICE: HCPCS | Performed by: FAMILY MEDICINE

## 2017-01-14 PROCEDURE — A9270 NON-COVERED ITEM OR SERVICE: HCPCS | Performed by: ANESTHESIOLOGY

## 2017-01-14 PROCEDURE — A9270 NON-COVERED ITEM OR SERVICE: HCPCS | Performed by: OBSTETRICS & GYNECOLOGY

## 2017-01-14 PROCEDURE — 700102 HCHG RX REV CODE 250 W/ 637 OVERRIDE(OP): Performed by: OBSTETRICS & GYNECOLOGY

## 2017-01-14 PROCEDURE — 700102 HCHG RX REV CODE 250 W/ 637 OVERRIDE(OP): Performed by: NURSE PRACTITIONER

## 2017-01-14 PROCEDURE — 700112 HCHG RX REV CODE 229: Performed by: OBSTETRICS & GYNECOLOGY

## 2017-01-14 PROCEDURE — 700102 HCHG RX REV CODE 250 W/ 637 OVERRIDE(OP): Performed by: ANESTHESIOLOGY

## 2017-01-14 RX ORDER — PSEUDOEPHEDRINE HCL 30 MG
100 TABLET ORAL 2 TIMES DAILY PRN
Qty: 60 CAP | Refills: 3 | Status: SHIPPED | OUTPATIENT
Start: 2017-01-14 | End: 2017-03-26

## 2017-01-14 RX ORDER — LANOLIN ALCOHOL/MO/W.PET/CERES
325 CREAM (GRAM) TOPICAL
Qty: 90 TAB | Refills: 3 | Status: SHIPPED | OUTPATIENT
Start: 2017-01-14 | End: 2017-03-26

## 2017-01-14 RX ORDER — HYDRALAZINE HYDROCHLORIDE 10 MG/1
10 TABLET, FILM COATED ORAL EVERY 6 HOURS PRN
Status: DISCONTINUED | OUTPATIENT
Start: 2017-01-14 | End: 2017-01-15 | Stop reason: HOSPADM

## 2017-01-14 RX ORDER — LABETALOL 100 MG/1
100 TABLET, FILM COATED ORAL TWICE DAILY
Status: DISCONTINUED | OUTPATIENT
Start: 2017-01-14 | End: 2017-01-15 | Stop reason: HOSPADM

## 2017-01-14 RX ORDER — IBUPROFEN 600 MG/1
600 TABLET ORAL EVERY 6 HOURS PRN
Qty: 30 TAB | Refills: 3 | Status: SHIPPED | OUTPATIENT
Start: 2017-01-14 | End: 2017-03-26

## 2017-01-14 RX ORDER — OXYCODONE HYDROCHLORIDE AND ACETAMINOPHEN 5; 325 MG/1; MG/1
1 TABLET ORAL EVERY 6 HOURS PRN
Qty: 30 TAB | Refills: 0 | Status: SHIPPED | OUTPATIENT
Start: 2017-01-14 | End: 2017-03-26

## 2017-01-14 RX ADMIN — Medication 1 TABLET: at 09:03

## 2017-01-14 RX ADMIN — IBUPROFEN 600 MG: 600 TABLET, FILM COATED ORAL at 13:30

## 2017-01-14 RX ADMIN — OXYCODONE HYDROCHLORIDE AND ACETAMINOPHEN 1 TABLET: 10; 325 TABLET ORAL at 20:36

## 2017-01-14 RX ADMIN — LABETALOL HYDROCHLORIDE 100 MG: 100 TABLET, FILM COATED ORAL at 17:15

## 2017-01-14 RX ADMIN — OXYCODONE HYDROCHLORIDE AND ACETAMINOPHEN 1 TABLET: 10; 325 TABLET ORAL at 11:04

## 2017-01-14 RX ADMIN — IBUPROFEN 600 MG: 600 TABLET, FILM COATED ORAL at 20:36

## 2017-01-14 RX ADMIN — OXYCODONE HYDROCHLORIDE AND ACETAMINOPHEN 1 TABLET: 10; 325 TABLET ORAL at 07:12

## 2017-01-14 RX ADMIN — OXYCODONE HYDROCHLORIDE AND ACETAMINOPHEN 1 TABLET: 10; 325 TABLET ORAL at 15:41

## 2017-01-14 RX ADMIN — HYDRALAZINE HYDROCHLORIDE 10 MG: 10 TABLET, FILM COATED ORAL at 14:16

## 2017-01-14 RX ADMIN — IBUPROFEN 600 MG: 600 TABLET, FILM COATED ORAL at 07:11

## 2017-01-14 RX ADMIN — DOCUSATE SODIUM 100 MG: 100 CAPSULE ORAL at 09:03

## 2017-01-14 ASSESSMENT — PAIN SCALES - GENERAL
PAINLEVEL_OUTOF10: 7
PAINLEVEL_OUTOF10: 7
PAINLEVEL_OUTOF10: 0
PAINLEVEL_OUTOF10: 3
PAINLEVEL_OUTOF10: 0
PAINLEVEL_OUTOF10: 0
PAINLEVEL_OUTOF10: 7
PAINLEVEL_OUTOF10: 8

## 2017-01-14 ASSESSMENT — LIFESTYLE VARIABLES: EVER_SMOKED: YES

## 2017-01-14 NOTE — PROGRESS NOTES
Called by nursing staff for persistent elevated blood pressures with most recent 140s/100s despite prior dose of hydralazine.  Will hold discharge for overnight monitoring and start labetalol 100mg BID.

## 2017-01-14 NOTE — PROGRESS NOTES
Pt doing well up and around independently medicated as needed. Report given to Noc shift RN pt care cont.

## 2017-01-14 NOTE — CARE PLAN
Problem: Altered physiologic condition related to postoperative  delivery  Goal: Patient physiologically stable as evidenced by normal lochia, palpable uterine involution and vital signs within normal limits  Outcome: PROGRESSING AS EXPECTED  Fundus firm, lochia light    Problem: Potential for postpartum infection related to surgical incision, compromised uterine condition, urinary tract or respiratory compromise  Goal: Patient will be afebrile and free from signs and symptoms of infection  Outcome: PROGRESSING AS EXPECTED  VSS

## 2017-01-14 NOTE — CARE PLAN
Problem: Altered physiologic condition related to postoperative  delivery  Goal: Patient physiologically stable as evidenced by normal lochia, palpable uterine involution and vital signs within normal limits  Outcome: PROGRESSING AS EXPECTED  Fundus light, lochia light    Problem: Potential for postpartum infection related to surgical incision, compromised uterine condition, urinary tract or respiratory compromise  Goal: Patient will be afebrile and free from signs and symptoms of infection  Outcome: PROGRESSING AS EXPECTED  No signs or symptoms of infection.

## 2017-01-14 NOTE — DISCHARGE PLANNING
Medical Social Work    Referral: Discharge Planning    Intervention: MERLY rec'd call from NATALIA Regalado stating that pt is leaving child in nursery for long periods of time and asked if nursery could feed him. She is not in the room for as long as 2 hrs at a time and returns smelling of cigarettes. The infant was also returned to the nursery smelling of cigarettes.  RN concerned that pt is not caring appropriately for child.     MERLY spoke with RN, Fay, who stated that pt has been appropriate with child today.     MERLY met with pt to find out some more information on FOB and other children. She informed SW that her boyfriend, Jeffery Shaw, who was present in the room, is not the FOB. FOB resides in [place] and is not involved. Neither will be added to birth certificate. Her other two children live with family in [place].    MERLY faxed progress notes to Beba at [facility]. They do not have CPS history on file, but have a report of the death of her child last year due to SIDS. MERLY informed morning on-, Fay, and afternoon worker, Beba, of RN's concerns. Also informed them pt does not have formula until the [facility] office opens on Tuesday. MERLY also noted room smells heavily of cigarettes.     Plan: Discharge pending per approval from CPS.

## 2017-01-14 NOTE — DISCHARGE PLANNING
Medical Social Work    This  received a call from CPS stating that they will be coming to [facility] to talk with mother.  NATALIA Aponte updated.

## 2017-01-14 NOTE — PROGRESS NOTES
3299- Dr. Appiah notified of patient's B/P.  Orders received.    0924- Patient alert, oriented.  VSS.  Family at bedside.  Fundus firm @ u, lochia light.  Patient voiding without difficulty.  Patient passing flatus.  Patient medicated for complaints of pain earlier this morning and states good control with oral meds.  Patient wishes to be medicated around the clock. Abdominal incision intact with staples without erythema, swelling, or drainage.  Patient bonding well with infant, encouraged to call with needs.  Will continue to monitor.  1115- Staples removed.  Steri strips applied.  Patient tolerated well.  1430- Dr. Padgett notified of patient's B/P, orders received to give hydralazine.  1530- B/P called to Dr. Sparks.  Orders received to hold discharge.

## 2017-01-14 NOTE — DISCHARGE INSTRUCTIONS
POSTPARTUM DISCHARGE INSTRUCTIONS FOR MOM    YOB: 1995   Age: 21 y.o.               Admit Date: 2017     Discharge Date: 2017  Attending Doctor:  Yu Prabhakar M.D.                  Allergies:  Review of patient's allergies indicates no known allergies.    Discharged to home by car. Discharged via wheelchair, hospital escort: Yes.  Special equipment needed: Not Applicable  Belongings with: Personal  Be sure to schedule a follow-up appointment with your primary care doctor or any specialists as instructed.     Discharge Plan:   Diet Plan: Discussed  Activity Level: Discussed  Confirmed Follow up Appointment: Patient to Call and Schedule Appointment  Confirmed Symptoms Management: Discussed  Medication Reconciliation Updated: Yes  Influenza Vaccine Indication: Patient Refuses    REASONS TO CALL YOUR OBSTETRICIAN:  1.   Persistent fever or shaking chills (Temperature higher than 100.4)  2.   Heavy bleeding (soaking more than 1 pad per hour); Passing clots  3.   Foul odor from vagina  4.   Mastitis (Breast infection; breast pain, chills, fever, redness)  5.   Urinary pain, burning or frequency  6.   Abdominal incision infection  7.   Severe depression longer than 24 hours    HAND WASHING  · Prior to handling the baby.  · Before breastfeeding or bottle feeding baby.  · After using the bathroom or changing the baby's diaper.    WOUND CARE  Ask your physician for additional care instructions.  In general:    ·  Incision:      · Keep clean and dry.    · Do NOT lift anything heavier than your baby for up to 6 weeks.    · There should not be any opening or pus.      VAGINAL CARE  · Nothing inside vagina for 6 weeks: no sexual intercourse, tampons or douching.  · Bleeding may continue for 2-4 weeks.  Amount may vary.    · Call your physician for heavy bleeding which means soaking more than 1 pad per hour    BIRTH CONTROL  · It is possible to become pregnant at any time after delivery and while  "breastfeeding.  · Plan to discuss a method of birth control with your physician at your follow up visit. visit.    DIET AND ELIMINATION  · Eating more fiber (bran cereal, fruits, and vegetables) and drinking plenty of fluids will help to avoid constipation.  · Urinary frequency after childbirth is normal.    POSTPARTUM BLUES  During the first few days after birth, you may experience a sense of the \"blues\" which may include impatience, irritability or even crying.  These feeling come and go quickly.  However, as many as 1 in 10 women experience emotional symptoms known as postpartum depression.    Postpartum depression:  May start as early as the second or third day after delivery or take several weeks or months to develop.  Symptoms of \"blues\" are present, but are more intense:  Crying spells; loss of appetite; feelings of hopelessness or loss of control; fear of touching the baby; over concern or no concern at all about the baby; little or no concern about your own appearance/caring for yourself; and/or inability to sleep or excessive sleeping.  Contact your physician if you are experiencing any of these symptoms.    Crisis Hotline:  · [place] Crisis Hotline:  5-666-JSRUPEE  Or 1-996.395.5408  · [place] Crisis Hotline:  1-521.262.9294  Or 597-650-9342    PREVENTING SHAKEN BABY:  If you are angry or stressed, PUT THE BABY IN THE CRIB, step away, take some deep breaths, and wait until you are calm to care for the baby.  DO NOT SHAKE THE BABY.  You are not alone, call a supporter for help.    · Crisis Call Center 24/7 crisis line 381-318-3585 or 1-998.303.1286  · You can also text them, text \"ANSWER\" to 575874    QUIT SMOKING/TOBACCO USE:  I understand the use of any tobacco products increases my chance of suffering from future heart disease and could cause other illnesses which may shorten my life. Quitting the use of tobacco products is the single most important thing I can do to improve my health. For further " information on smoking / tobacco cessation call a Toll Free Quit Line at 1-976.466.7690 (*[employer] [place]) or 1-364.965.8023 (American Lung Association) or you can access the web based program at www.lungusa.org.    · [place] Tobacco Users Help Line:  (630) 462-1199       Toll Free: 1-288.784.8498  · Quit Tobacco Program [facility] (656)490-7042    DEPRESSION / SUICIDE RISK:  As you are discharged from this [facility], it is important to learn how to keep safe from harming yourself.    Recognize the warning signs:  · Abrupt changes in personality, positive or negative- including increase in energy   · Giving away possessions  · Change in eating patterns- significant weight changes-  positive or negative  · Change in sleeping patterns- unable to sleep or sleeping all the time   · Unwillingness or inability to communicate  · Depression  · Unusual sadness, discouragement and loneliness  · Talk of wanting to die  · Neglect of personal appearance   · Rebelliousness- reckless behavior  · Withdrawal from people/activities they love  · Confusion- inability to concentrate     If you or a loved one observes any of these behaviors or has concerns about self-harm, here's what you can do:  · Talk about it- your feelings and reasons for harming yourself  · Remove any means that you might use to hurt yourself (examples: pills, rope, extension cords, firearm)  · Get professional help from the community (Mental Health, Substance Abuse, psychological counseling)  · Do not be alone:Call your Safe Contact- someone whom you trust who will be there for you.  · Call your local CRISIS HOTLINE 058-9584 or 195-876-0383  · Call your local Children's Mobile Crisis Response Team [place] (528) 098-2164 or www.[employer]  · Call the toll free National Suicide Prevention Hotlines   · National Suicide Prevention Lifeline 257-037-HDJR (3881)  · National Hope Line Network 800-SUICIDE  (821-8760)    DISCHARGE SURVEY:  Thank you for choosing [facility].  We hope we provided you with very good care.  You may be receiving a survey in the mail.  Please fill it out.  Your opinion is valuable to us.    ADDITIONAL EDUCATIONAL MATERIALS GIVEN TO PATIENT:        My signature on this form indicates that:  1.  I have reviewed and understand the above information  2.  My questions regarding this information have been answered to my satisfaction.  3.  I have formulated a plan with my discharge nurse to obtain my prescribed medication for home.

## 2017-01-14 NOTE — CARE PLAN
Problem: Altered physiologic condition related to postoperative  delivery  Goal: Patient physiologically stable as evidenced by normal lochia, palpable uterine involution and vital signs within normal limits  Outcome: PROGRESSING AS EXPECTED  Fundus firm, lochia light.    Problem: Potential for postpartum infection related to surgical incision, compromised uterine condition, urinary tract or respiratory compromise  Goal: Patient will be afebrile and free from signs and symptoms of infection  Outcome: PROGRESSING AS EXPECTED  VSS

## 2017-01-14 NOTE — DISCHARGE SUMMARY
Discharge Summary:      Earnestine Knight      Admit Date:   2017  Discharge Date:  2017     Admitting diagnosis:  Pregnancy  Labor and delivery indication for care or intervention  PIH. hx of previous c section  Elevated blood pressures  Discharge Diagnosis: Status post  for repeat.  Pregnancy Complications: smoker  Tubal Ligation:  no        History:  Past Medical History   Diagnosis Date   • Depression    • Anxiety    • History of  x 3 needs another csections 2016   • SIDS (sudden infant death syndrome) with last baby at 6 weeks of age 2016   • Tobacco dependence says cut down to 5 cigarettes/day 2016     OB History    Para Term  AB SAB TAB Ectopic Multiple Living   4 3 3       2      # Outcome Date GA Lbr Ramin/2nd Weight Sex Delivery Anes PTL Lv   4 Current            3 Term 09/08/15 40w0d  3.827 kg (8 lb 7 oz) M CS-Unspec Spinal N FD      Comments: SIDS   2 Term 2014 39w0d  3.345 kg (7 lb 6 oz) F CS-LTranv Spinal  Y   1 Term 13 40w0d  4.706 kg (10 lb 6 oz) F CS-LTranv EPI N Y      Complications: Failure to Progress in Second Stage           Review of patient's allergies indicates no known allergies.  Patient Active Problem List    Diagnosis Date Noted   • SIDS (sudden infant death syndrome) with last baby at 6 weeks of age 2016   • Tobacco dependence says cut down to 5 cigarettes/day 2016        Hospital Course:   21 y.o. , now para 3, was admitted with the above mentioned diagnosis, underwent Repeat  repeat,  for repeat. Patient postpartum course was unremarkable, with progressive advancement in diet , ambulation and toleration of oral analgesia. Patient without complaints today and desires discharge.      Filed Vitals:    17 2000 17 0800 17 1600 17   BP: 127/92 141/95 143/91 136/93   Pulse: 81 81 84 80   Temp: 36.5 °C (97.7 °F) 37 °C (98.6 °F) 36.4 °C (97.6 °F) 36.3 °C  (97.4 °F)   Resp: 20 20 16 18   Height:       Weight:       SpO2: 94% 94% 92% 93%       Current Facility-Administered Medications   Medication Dose   • oxycodone-acetaminophen (PERCOCET) 5-325 MG per tablet 1 Tab  1 Tab   • morphine (pf) 10 mg/ml injection 5 mg  5 mg   • oxycodone-acetaminophen (PERCOCET-10)  MG per tablet 1 Tab  1 Tab   • morphine (pf) 10 mg/ml injection 10 mg  10 mg   • ondansetron (ZOFRAN) syringe/vial injection 4 mg  4 mg   • ibuprofen (MOTRIN) tablet 600 mg  600 mg   • LR infusion     • oxytocin (PITOCIN) infusion (for postpartum)   mL/hr   • misoprostol (CYTOTEC) tablet 1,000 mcg  1,000 mcg   • docusate sodium (COLACE) capsule 100 mg  100 mg   • bisacodyl (DULCOLAX) suppository 10 mg  10 mg   • simethicone (MYLICON) chewable tab 80 mg  80 mg   • prenatal plus vitamin (STUARTNATAL 1+1) 27-1 MG tablet 1 Tab  1 Tab   • acetaminophen (TYLENOL) tablet 650 mg  650 mg   • oxycodone-acetaminophen (PERCOCET-10)  MG per tablet 1 Tab  1 Tab   • oxytocin (PITOCIN) infusion (for postpartum)   mL/hr       Exam:  Breast Exam: negative  Abdomen: Abdomen soft, non-tender. BS normal. No masses,  No organomegaly  Fundus Non Tender: no  Incision: dry and intact  Perineum: perineum intact  Extremity: extremities, peripheral pulses and reflexes normal     Labs:  Recent Labs      01/11/17   1609  01/13/17   0543   WBC  9.0  9.5   RBC  4.13*  3.60*   HEMOGLOBIN  11.9*  10.0*   HEMATOCRIT  35.1*  31.0*   MCV  85.0  86.1   MCH  28.8  27.8   MCHC  33.9  32.3*   RDW  46.9  48.3   PLATELETCT  156*  133*   MPV  10.7  10.9        Activity:   Discharge to home  Pelvic Rest x 6 weeks    Assessment:  normal postpartum course  Discharge Assessment: Taking adequate diet and fluids     Follow up: .TPC 1 week for incision check.      Discharge Meds:   Current Outpatient Prescriptions   Medication Sig Dispense Refill   • oxycodone-acetaminophen (PERCOCET) 5-325 MG Tab Take 1 Tab by mouth every 6 hours as  needed for Moderate Pain ((Pain Scale 4-6)). 30 Tab 0   • ibuprofen (MOTRIN) 600 MG Tab Take 1 Tab by mouth every 6 hours as needed for Mild Pain. 30 Tab 3   • docusate sodium 100 MG Cap Take 100 mg by mouth 2 times a day as needed for Constipation. 60 Cap 3   • ferrous sulfate 325 (65 FE) MG EC tablet Take 1 Tab by mouth 3 times a day, with meals. 90 Tab 3       Tara Webb M.D.

## 2017-01-15 VITALS
BODY MASS INDEX: 44.3 KG/M2 | SYSTOLIC BLOOD PRESSURE: 117 MMHG | RESPIRATION RATE: 22 BRPM | HEIGHT: 63 IN | OXYGEN SATURATION: 95 % | TEMPERATURE: 97.9 F | DIASTOLIC BLOOD PRESSURE: 84 MMHG | HEART RATE: 90 BPM | WEIGHT: 250 LBS

## 2017-01-15 PROCEDURE — A9270 NON-COVERED ITEM OR SERVICE: HCPCS | Performed by: NURSE PRACTITIONER

## 2017-01-15 PROCEDURE — 700102 HCHG RX REV CODE 250 W/ 637 OVERRIDE(OP): Performed by: OBSTETRICS & GYNECOLOGY

## 2017-01-15 PROCEDURE — 700102 HCHG RX REV CODE 250 W/ 637 OVERRIDE(OP): Performed by: FAMILY MEDICINE

## 2017-01-15 PROCEDURE — A9270 NON-COVERED ITEM OR SERVICE: HCPCS | Performed by: FAMILY MEDICINE

## 2017-01-15 PROCEDURE — 700102 HCHG RX REV CODE 250 W/ 637 OVERRIDE(OP): Performed by: NURSE PRACTITIONER

## 2017-01-15 PROCEDURE — A9270 NON-COVERED ITEM OR SERVICE: HCPCS | Performed by: OBSTETRICS & GYNECOLOGY

## 2017-01-15 RX ADMIN — Medication 1 TABLET: at 08:41

## 2017-01-15 RX ADMIN — OXYCODONE HYDROCHLORIDE AND ACETAMINOPHEN 1 TABLET: 10; 325 TABLET ORAL at 08:41

## 2017-01-15 RX ADMIN — IBUPROFEN 600 MG: 600 TABLET, FILM COATED ORAL at 10:26

## 2017-01-15 RX ADMIN — LABETALOL HYDROCHLORIDE 100 MG: 100 TABLET, FILM COATED ORAL at 10:26

## 2017-01-15 RX ADMIN — IBUPROFEN 600 MG: 600 TABLET, FILM COATED ORAL at 16:30

## 2017-01-15 RX ADMIN — HYDRALAZINE HYDROCHLORIDE 10 MG: 10 TABLET, FILM COATED ORAL at 08:41

## 2017-01-15 RX ADMIN — OXYCODONE HYDROCHLORIDE AND ACETAMINOPHEN 1 TABLET: 10; 325 TABLET ORAL at 00:35

## 2017-01-15 RX ADMIN — IBUPROFEN 600 MG: 600 TABLET, FILM COATED ORAL at 02:41

## 2017-01-15 RX ADMIN — OXYCODONE HYDROCHLORIDE AND ACETAMINOPHEN 1 TABLET: 10; 325 TABLET ORAL at 16:30

## 2017-01-15 ASSESSMENT — PAIN SCALES - GENERAL
PAINLEVEL_OUTOF10: 3
PAINLEVEL_OUTOF10: 5
PAINLEVEL_OUTOF10: 7
PAINLEVEL_OUTOF10: 7
PAINLEVEL_OUTOF10: 5
PAINLEVEL_OUTOF10: 6
PAINLEVEL_OUTOF10: 7
PAINLEVEL_OUTOF10: 8
PAINLEVEL_OUTOF10: 5

## 2017-01-15 NOTE — DISCHARGE PLANNING
Medical Social Work    This  received a call from Lexi with CPS.  Per Lexi she has concerns about baby going home with mother and is going to review case notes and will call this  back with decision about if baby with d/c with mother or if CPS will take custody.

## 2017-01-15 NOTE — DISCHARGE PLANNING
Medical Social Work    This  placed a call to CPS to inquire about status of d/c.  Beba Chang CPS worker will be taking over the case and she will call once a d/c decision has been made.  There is no ETA at this time.

## 2017-01-15 NOTE — PROGRESS NOTES
Notified Dr. Padgett of patient's high blood pressure, new order to give PO hydralazine, recheck blood pressure in one hour.  if still high give am dose of labetalol.

## 2017-01-15 NOTE — PROGRESS NOTES
0725-Report received at bedside from Lydia RN, assumed care of patient. Encouraged to call with needs, denies at this time.   0842-Assessment completed, fundus is firm, lochia light and vital signs within defined parameters. Patient is ambulating and voiding without difficulty.  Discussed with patient pain medication plan, patient requesting to be medicated PRN, will call for medication. Plan of care discussed, patient verbalized understanding. Hourly rounding implemented, call light within reach.

## 2017-01-15 NOTE — PROGRESS NOTES
Patient states that she would like to have pain medication when available and if she is sleeping she would like to be woken up. Pain assessment will continue.

## 2017-01-15 NOTE — DISCHARGE PLANNING
Care Transition Team Discharge Planning                   Discharge Plan: SW received call from bedside nurse that pt is ready to d/c as well as baby but CPS was involved yesterday and we need to make sure it is ok for baby to dc/ home with mom. MERLY paged CPS to inquire if they were able to see pt and get an update on baby going home with mom.     Plan: Await return call from CPS worker.

## 2017-01-15 NOTE — PROGRESS NOTES
POSTOPERATIVE  SECTION PROGRESS NOTE;        PATIENT ID:  NAME:  Earnestine Knight  MRN:               [id number]  YOB: 1995     21 y.o. female  at 38w0d POD#4 s/p CS       Subjective: doing well    Objective:    Filed Vitals:    17 1145 17 1400 17 1530 17   BP: 141/97 150/98 140/107 139/90   Pulse: 62 81 85 66   Temp: 36.4 °C (97.6 °F)   36.6 °C (97.9 °F)   Resp: 20 20 20 18   Height:       Weight:       SpO2:    94%     General: No acute distress, resting comfortably in bed.  HEENT: normocephalic, nontraumatic, PERRLA, EOMI  Cardiovascular: Heart RRR with no murmurs, rubs or gallops. Distal Pulses 2+  Respiratory: symmetric chest expansion, lungs CTA bilaterally with no wheezes rales or rhonci  Abdomen: soft, mildly tender around incision which is clean, dry and intact, fundus firm, +BS  Genitourinary: lochia light, denies excessive vaginal bleeding  Musculoskeletal: strength 5/5 in four extremities  Neuro: non focal with no numbness, tingling or changes in sensation      Recent Labs      17   0543   WBC  9.5   RBC  3.60*   HEMOGLOBIN  10.0*   HEMATOCRIT  31.0*   MCV  86.1   MCH  27.8   RDW  48.3   PLATELETCT  133*   MPV  10.9     No results for input(s): SODIUM, POTASSIUM, CHLORIDE, CO2, GLUCOSE, BUN, CPKTOTAL in the last 72 hours.    Current Meds:   Current Facility-Administered Medications   Medication Dose Frequency Provider Last Rate Last Dose   • hydrALAZINE (APRESOLINE) tablet 10 mg  10 mg Q6HRS PRN Brian Padgett M.D.   10 mg at 17 1416   • labetalol (NORMODYNE) tablet 100 mg  100 mg TWICE DAILY Brian Padgett M.D.   100 mg at 17 1715   • oxycodone-acetaminophen (PERCOCET) 5-325 MG per tablet 1 Tab  1 Tab Q4HRS PRN Yu Prabhakar M.D.       • morphine (pf) 10 mg/ml injection 5 mg  5 mg Q3HRS PRN Yu Prabhakar M.D.       • oxycodone-acetaminophen (PERCOCET-10)  MG per tablet 1 Tab  1 Tab Q4HRS PRN Yu Prabhakar M.D.   1 Tab at  01/15/17 0035   • morphine (pf) 10 mg/ml injection 10 mg  10 mg Q3HRS PRN Yu Prabhakar M.D.       • ondansetron (ZOFRAN) syringe/vial injection 4 mg  4 mg Q6HRS PRN Yu Prabhakar M.D.       • ibuprofen (MOTRIN) tablet 600 mg  600 mg Q6HRS PRN JONATHAN [email or web address]   600 mg at 01/15/17 0241   • LR infusion   Continuous Yu Prabhakar M.D.   Stopped at 01/12/17 0415   • oxytocin (PITOCIN) infusion (for postpartum)   mL/hr Continuous Yu Prabhakar M.D. 125 mL/hr at 01/12/17 0415 125 mL/hr at 01/12/17 0415   • misoprostol (CYTOTEC) tablet 1,000 mcg  1,000 mcg Once PRN Yu Prabhakar M.D.       • docusate sodium (COLACE) capsule 100 mg  100 mg BID PRN Yu Prabhakar M.D.   100 mg at 01/14/17 0903   • bisacodyl (DULCOLAX) suppository 10 mg  10 mg PRN Yu Prabhakar M.D.       • simethicone (MYLICON) chewable tab 80 mg  80 mg 4X/DAY PRN Yu Prabhakar M.D.       • prenatal plus vitamin (STUARTNATAL 1+1) 27-1 MG tablet 1 Tab  1 Tab QAM Yu Prabhakar M.D.   1 Tab at 01/14/17 0903   • acetaminophen (TYLENOL) tablet 650 mg  650 mg Q4HRS PRN Yu Prabhakar M.D.       • oxycodone-acetaminophen (PERCOCET-10)  MG per tablet 1 Tab  1 Tab Q4HRS PRN Norbert Kendrick M.D.   1 Tab at 01/14/17 2036   • oxytocin (PITOCIN) infusion (for postpartum)   mL/hr Continuous FABIOLA FitzgeraldP. 125 mL/hr at 01/12/17 0054 125 mL/hr at 01/12/17 0054     Last reviewed on 1/14/2017  1:22 PM by Fay Cole R.N.        Assessment:  21 y.o. female  at 38w0d POD#4 s/p CS-stable     Plan:   1. home  2. Disposition: FU 1 week at [facility]    Miguel A Sampson MD

## 2017-01-16 ENCOUNTER — TELEPHONE (OUTPATIENT)
Dept: OBGYN | Facility: CLINIC | Age: 22
End: 2017-01-16

## 2017-01-16 NOTE — DISCHARGE PLANNING
Medical Social Work    This  met with CPS.  CPS stated that they are taking custody of baby.  This  called security to escort CPS workers to [facility] to take infant.

## 2017-01-16 NOTE — PROGRESS NOTES
Discharge education for mom given to MOB. Patient verbalized understanding. All questions answered. Infant will stay in NBN as boarder pending CPS clearance.

## 2017-01-16 NOTE — TELEPHONE ENCOUNTER
Disability forms are ready for pt to pick them up.    Unable to contact pt msg left w/Chapin to let her know.

## 2017-01-16 NOTE — PROGRESS NOTES
CPS here to talk to pt about warrant for infant. Patient upset. All questions answered. Patient said goodbye to infant and then took belongings and left the unit.

## 2017-01-16 NOTE — DISCHARGE PLANNING
"Medical Social Work    Ongoing: This  received a call from Lexi CPS Supervisor.  Per Lexi CPS was under the impression that Jeffery Shaw (who is the father of the baby that  of SIDS last year) was the father.  This  met with pt and Jeffery at bedside.  When this  asked Jeffery if he was FOB he stated \"[facility] no\".  Pt stated that FOB is in [place] and is not involved.  Pt states that there will be no male listed on birth certificate.  This  placed a call to Lexi with CPS and Lexi stated they will be out to talk with pt again because pt has been giving conflicting information.    This  went to inform pt and Jeffery of this.  Pt and Jeffery were in the process of taking baby to the nursery so they could go out and smoke.  This  informed pt and Jeffery that CPS will be coming out to speak with them, and pt stated \"Is it bad?\"   This  informed pt that at this time this  does not know of CPS's decision.  NATALIA Regalado updated.  "

## 2017-01-17 ENCOUNTER — HOSPITAL ENCOUNTER (OUTPATIENT)
Facility: MEDICAL CENTER | Age: 22
End: 2017-01-17
Attending: OBSTETRICS & GYNECOLOGY | Admitting: OBSTETRICS & GYNECOLOGY
Payer: MEDICAID

## 2017-02-17 ENCOUNTER — POST PARTUM (OUTPATIENT)
Dept: OBGYN | Facility: CLINIC | Age: 22
End: 2017-02-17
Payer: MEDICAID

## 2017-02-17 VITALS — BODY MASS INDEX: 40.22 KG/M2 | SYSTOLIC BLOOD PRESSURE: 130 MMHG | WEIGHT: 227 LBS | DIASTOLIC BLOOD PRESSURE: 86 MMHG

## 2017-02-17 PROCEDURE — 90050 PR POSTPARTUM VISIT: CPT | Performed by: NURSE PRACTITIONER

## 2017-02-17 NOTE — PROGRESS NOTES
Subjective   Subjective:    Earnestine Knight is a 21 y.o. female who presents for her postpartum exam s/p LTCS for elevated BP's. She did not come to her post op appt. Baby was taken away from her but she states she does not know why. She states she is accused of drug use yet no one has done a drug test. States cries and is having depression. Wishes for mental health referral. States not a harm to herself or others. No SI or HI thoughts. She does not have a current mental health provider. Her prenatal course was complicated by the below problem list. She denies dysuria, vaginal bleeding, odor, itching or breast problems. She is bottle feeding her infant when she is able to see him. He is currently in foster and is in the hospital for a RSV infection. She desires an IUD for her birth control method. Reports no sex prior to this appointment. Eating a regular diet without difficulty. Bowel movement are Normal.  The patient is not having any pain. Current menses now.  Patient Denies Incisional pain, drainage or redness.      Problem List     Patient Active Problem List    Diagnosis Date Noted   • SIDS (sudden infant death syndrome) with last baby at 6 weeks of age 07/19/2016   • Tobacco dependence says cut down to 5 cigarettes/day 07/19/2016       Objective    See PE  Lab: H&H upon discharge  Weight - 227 lb  Vitals 130/86    Assessment   Assessment:    1. PP care of non- lactating women   2. Exam WNL   3. Pap WNL  4. Desires contraception  - IUD  4.  Depression    Plan   Plan:    1. Breastfeeding support   2. Continue PNV   3. Contraceptive counseling - referral placed for IUD/GYN  4. Encouraged condom use   5. Discussed diet, exercise and resumption of sexual activity   6. Preconception guidance for next pregnancy if applicable. See problem list for risk factors. Folic acid for all women of childbearing age.   7. Mental health with prescribing privileges provider referral given.

## 2017-02-17 NOTE — MR AVS SNAPSHOT
Earnestine Knight   2017 11:00 AM   Post Partum   MRN: [id number]    [facility]   Dept Phone:  [phone]    Description:  Female : 1995   Provider:  Taylor Hassan D.N.P.           Allergies as of 2017     No Known Allergies      You were diagnosed with     Postpartum examination following  delivery   [[id number]]         Vital Signs     Blood Pressure Weight Last Menstrual Period Breastfeeding? Smoking Status       130/86 mmHg 102.967 kg (227 lb) (LMP Unknown) Unknown Current Every Day Smoker       Basic Information     Date Of Birth Sex Race Ethnicity Preferred Language    1995 Female White Non- English      Problem List              ICD-10-CM Priority Class Noted - Resolved    SIDS (sudden infant death syndrome) with last baby at 6 weeks of age R99   2016 - Present    Tobacco dependence says cut down to 5 cigarettes/day F17.200   2016 - Present      Health Maintenance        Date Due Completion Dates    IMM HEP B VACCINE (1 of 3 - Primary Series) 1993 ---    IMM HEP A VACCINE (1 of 2 - Standard Series) 1994 ---    IMM HPV VACCINE (1 of 3 - Female 3 Dose Series) 2004 ---    IMM VARICELLA (CHICKENPOX) VACCINE (1 of 2 - 2 Dose Adolescent Series) 2006 ---    IMM INFLUENZA (1) 2016    PAP SMEAR 2019    IMM DTaP/Tdap/Td Vaccine (2 - Td) 11/10/2026 11/10/2016, 2015            Current Immunizations     Influenza Vaccine Quad Inj (Preserved) 2015  5:03 AM    Pneumococcal polysaccharide vaccine (PPSV-23) 2015  5:59 PM    Tdap Vaccine 11/10/2016  9:11 AM, 2015  5:04 AM      Below and/or attached are the medications your provider expects you to take. Review all of your home medications and newly ordered medications with your provider and/or pharmacist. Follow medication instructions as directed by your provider and/or pharmacist. Please keep your medication list with you and  share with your provider. Update the information when medications are discontinued, doses are changed, or new medications (including over-the-counter products) are added; and carry medication information at all times in the event of emergency situations     Allergies:  No Known Allergies          Medications  Valid as of: February 17, 2017 - 11:58 AM    Generic Name Brand Name Tablet Size Instructions for use    Docusate Sodium (Cap)  MG Take 100 mg by mouth 2 times a day as needed for Constipation.        Ferrous Sulfate (Tablet Delayed Response) ferrous sulfate 325 (65 FE) MG Take 1 Tab by mouth 3 times a day, with meals.        Ibuprofen (Tab) MOTRIN 600 MG Take 1 Tab by mouth every 6 hours as needed for Mild Pain.        Oxycodone-Acetaminophen (Tab) PERCOCET 5-325 MG Take 1 Tab by mouth every 6 hours as needed for Moderate Pain ((Pain Scale 4-6)).        Prenatal MV-Min-Fe Fum-FA-DHA   Take  by mouth.        .                 Medicines prescribed today were sent to:     [facility] [id number] [place], [place] [id number] [address] [place] [id number]    Phone: [phone] Fax: [phone]    Open 24 Hours?: No      Medication refill instructions:       If your prescription bottle indicates you have medication refills left, it is not necessary to call your provider’s office. Please contact your pharmacy and they will refill your medication.    If your prescription bottle indicates you do not have any refills left, you may request refills at any time through one of the following ways: The online [employer] system (except Urgent Care), by calling your provider’s office, or by asking your pharmacy to contact your provider’s office with a refill request. Medication refills are processed only during regular business hours and may not be available until the next business day. Your provider may request additional information or to have a follow-up visit with you prior to refilling your medication.   *Please Note:  Medication refills are assigned a new Rx number when refilled electronically. Your pharmacy may indicate that no refills were authorized even though a new prescription for the same medication is available at the pharmacy. Please request the medicine by name with the pharmacy before contacting your provider for a refill.        Referral     A referral request has been sent to our patient care coordination department. Please allow 3-5 business days for us to process this request and contact you either by phone or mail. If you do not hear from us by the 5th business day, please call us at [phone].           [employer] Access Code: [email or web address]  Expires: 2/19/2017  9:43 AM    [employer]  A secure, online tool to manage your health information     [employer]’s [employer]® is a secure, online tool that connects you to your personalized health information from the privacy of your home -- day or night - making it very easy for you to manage your healthcare. Once the activation process is completed, you can even access your medical information using the [employer] isrrael, which is available for free in the Apple Isrrael store or Google Play store.     [employer] provides the following levels of access (as shown below):   My Chart Features   [facility] Doctor [facility]  Specialists [facility]  Urgent  Care Non-[facility]  Primary Care  Doctor   Email your healthcare team securely and privately 24/7 X X X    Manage appointments: schedule your next appointment; view details of past/upcoming appointments X      Request prescription refills. X      View recent personal medical records, including lab and immunizations X X X X   View health record, including health history, allergies, medications X X X X   Read reports about your outpatient visits, procedures, consult and ER notes X X X X   See your discharge summary, which is a recap of your hospital and/or ER visit that includes your diagnosis, lab results, and care plan. X X       How to  register for [employer]:  1. Go to  [email or web address].  2. Click on the Sign Up Now box, which takes you to the New Member Sign Up page. You will need to provide the following information:  a. Enter your [employer] Access Code exactly as it appears at the top of this page. (You will not need to use this code after you’ve completed the sign-up process. If you do not sign up before the expiration date, you must request a new code.)   b. Enter your date of birth.   c. Enter your home email address.   d. Click Submit, and follow the next screen’s instructions.  3. Create a [employer] ID. This will be your [employer] login ID and cannot be changed, so think of one that is secure and easy to remember.  4. Create a [employer] password. You can change your password at any time.  5. Enter your Password Reset Question and Answer. This can be used at a later time if you forget your password.   6. Enter your e-mail address. This allows you to receive e-mail notifications when new information is available in [employer].  7. Click Sign Up. You can now view your health information.    For assistance activating your [employer] account, call [phone]

## 2017-02-17 NOTE — PROGRESS NOTES
Subjective:      Earnestine Knight is a 21 y.o. female who presents with No chief complaint on file.            HPI    ROS       Objective:     /86 mmHg  Wt 102.967 kg (227 lb)  LMP  (LMP Unknown)  Breastfeeding? Unknown     Physical Exam   Constitutional: She is oriented to person, place, and time. She appears well-developed and well-nourished.   HENT:   Head: Normocephalic.   Eyes: Pupils are equal, round, and reactive to light.   Neck: Normal range of motion. Neck supple. No thyromegaly present.   Cardiovascular: Normal rate and regular rhythm.    Pulmonary/Chest: Effort normal and breath sounds normal.   Abdominal: Soft. Bowel sounds are normal. She exhibits no distension.   Genitourinary: Vagina normal and uterus normal.   Musculoskeletal: Normal range of motion.   Neurological: She is oriented to person, place, and time.   Skin: Skin is warm and dry.   Psychiatric: She has a normal mood and affect. Her behavior is normal. Judgment and thought content normal.   Nursing note and vitals reviewed.              Assessment/Plan:     There are no diagnoses linked to this encounter.

## 2017-02-17 NOTE — PROGRESS NOTES
Pt here today for postpartum exam.  Operation Date: 1/11/17  Currently: bottle feeding.   BCM: pt not sure , [facility].   Wt: 227lb  BP: 130/86  Good ph:[phone]  Pt states having a lot of bleeding, also is tired and depressed.

## 2017-03-26 ENCOUNTER — HOSPITAL ENCOUNTER (EMERGENCY)
Facility: MEDICAL CENTER | Age: 22
End: 2017-03-26
Attending: EMERGENCY MEDICINE
Payer: MEDICAID

## 2017-03-26 VITALS
HEIGHT: 63 IN | TEMPERATURE: 98.2 F | DIASTOLIC BLOOD PRESSURE: 93 MMHG | RESPIRATION RATE: 16 BRPM | WEIGHT: 231.48 LBS | SYSTOLIC BLOOD PRESSURE: 147 MMHG | OXYGEN SATURATION: 96 % | BODY MASS INDEX: 41.02 KG/M2 | HEART RATE: 88 BPM

## 2017-03-26 DIAGNOSIS — Z72.0 TOBACCO ABUSE: ICD-10-CM

## 2017-03-26 DIAGNOSIS — B34.9 VIRAL SYNDROME: ICD-10-CM

## 2017-03-26 PROCEDURE — 94640 AIRWAY INHALATION TREATMENT: CPT

## 2017-03-26 PROCEDURE — 700101 HCHG RX REV CODE 250: Performed by: EMERGENCY MEDICINE

## 2017-03-26 PROCEDURE — 99283 EMERGENCY DEPT VISIT LOW MDM: CPT

## 2017-03-26 RX ORDER — ALBUTEROL SULFATE 2.5 MG/3ML
2.5 SOLUTION RESPIRATORY (INHALATION)
Status: DISCONTINUED | OUTPATIENT
Start: 2017-03-26 | End: 2017-03-26 | Stop reason: ALTCHOICE

## 2017-03-26 RX ADMIN — ALBUTEROL SULFATE 2.5 MG: 2.5 SOLUTION RESPIRATORY (INHALATION) at 15:12

## 2017-03-26 ASSESSMENT — LIFESTYLE VARIABLES: DO YOU DRINK ALCOHOL: NO

## 2017-03-26 ASSESSMENT — PAIN SCALES - GENERAL: PAINLEVEL_OUTOF10: 0

## 2017-03-26 NOTE — ED NOTES
Chief Complaint   Patient presents with   • Cough     x 3 days    Pt to triage in NAD.  Denies, fever, N/V, abdominal pain.  Pt educated on triage process and instructed to notify triage RN of any change in status.

## 2017-03-26 NOTE — ED AVS SNAPSHOT
Home Care Instructions                                                                                                                Earnestine Knight   MRN: [id number]    Department:  [facility], Emergency Dept   Date of Visit:  3/26/2017            [facility], Emergency Dept    [address]    [place] [address]    Phone:  [phone]      You were seen by     Ivan Pedraza M.D.      Your Diagnosis Was     Viral syndrome     B34.9       These are the medications you received during your hospitalization from 03/26/2017 1323 to 03/26/2017 1537     Date/Time Order Dose Route Action    03/26/2017 1512 albuterol (PROVENTIL) 2.5mg/3ml nebulizer solution 2.5 mg 2.5 mg Nebulization Given      Follow-up Information     1. Follow up with [facility]. Schedule an appointment as soon as possible for a visit in 4 days.    Why:  As needed    Contact information    [address]  [facility] [address]  [phone]        Medication Information     Review all of your home medications and newly ordered medications with your primary doctor and/or pharmacist as soon as possible. Follow medication instructions as directed by your doctor and/or pharmacist.     Please keep your complete medication list with you and share with your physician. Update the information when medications are discontinued, doses are changed, or new medications (including over-the-counter products) are added; and carry medication information at all times in the event of emergency situations.               Medication List      START taking these medications        Instructions    Morning Afternoon Evening Bedtime    hydrocodone-acetaminophen 2.5-108 mg/5mL 7.5-325 MG/15ML solution   Commonly known as:  HYCET        Take 10 mL by mouth 4 times a day as needed for Cough.   Dose:  10 mL                        * nicotine 7 MG/24HR Pt24   Commonly known as:  NICODERM        Doctor's comments:  Use twoo patches for 10  days then one patch for 10 days then off   Apply 1 Patch to skin as directed every 24 hours.   Dose:  1 Patch                        * nicotine 7 MG/24HR Pt24   Commonly known as:  NICODERM        Doctor's comments:  Used two patches for 10 days followed by one patch for 10 days   Apply 1 Patch to skin as directed every 24 hours.   Dose:  1 Patch                        * Notice:  This list has 2 medication(s) that are the same as other medications prescribed for you. Read the directions carefully, and ask your doctor or other care provider to review them with you.         Where to Get Your Medications      These medications were sent to [facility] [address], [place] - [address]  [address], [place] [address]     Phone:  [phone]    - nicotine 7 MG/24HR Pt24      You can get these medications from any pharmacy     Bring a paper prescription for each of these medications    - hydrocodone-acetaminophen 2.5-108 mg/5mL 7.5-325 MG/15ML solution  - nicotine 7 MG/24HR Pt24            Procedures and tests performed during your visit     SMALL VOLUME NEBULIZER        Discharge Instructions        Viral Illness    Take ibuprofen 600-800 mg every 6 hours or naproxen 500 mg every 12 hours for pain and fever. Use hydrocodone syrup for cough Return for ill appearance or shortness of breath.  See a doctor if not better or worsening after 5-6 days of fever or other significant symptoms. Stop smoking tobacco.    You had a borderline or high normal blood pressure reading today.  This does not necessarily mean you have hypertension.  Please followup with your/a primary physician for comprehensive blood pressure evaluation and yearly fasting cholesterol assessment.  BP Readings from Last 3 Encounters:   03/26/17 147/93   02/17/17 130/86   01/15/17 117/84     .    Your exam indicates you have a viral illness.  Typical symptoms of virus infections include: fever, muscle aches, headache, fatigue, stomach upsets, sore throat, and  "dry cough. Antibiotic drugs are not effective in viral illnesses; they are only given when there is a secondary bacterial infection.    General treatment includes bed rest, increasing oral fluid intake of clear, non-caffeinated drinks like ginger ale, fruit juices, water, or sports (electrolyte) drinks, and medicine to relieve specific symptoms such as cough, pain, or diarrhea.  Acetaminophen (Tylenol) or ibuprofen may be used to help control fever and pain.      Please call your doctor if you are not better after 2-3 days of symptom treatment.  Call or return here right away if your illness gets more severe, or you develop any other new symptoms, such as a fever above 103 F, vomiting for more than a day, severe headache or other pain, stiff neck, trouble breathing, visual problems, \"blackouts\" or fainting.    Document Released: 12/18/2006    T[employer]emaker® Patient Information ©2008 [employer].            Patient Information     Patient Information    Following emergency treatment: all patient requiring follow-up care must return either to a private physician or a clinic if your condition worsens before you are able to obtain further medical attention, please return to the emergency room.     Billing Information    At [facility], we work to make the billing process streamlined for our patients.  Our Representatives are here to answer any questions you may have regarding your hospital bill.  If you have insurance coverage and have supplied your insurance information to us, we will submit a claim to your insurer on your behalf.  Should you have any questions regarding your bill, we can be reached online or by phone as follows:  Online: You are able pay your bills online or live chat with our representatives about any billing questions you may have. We are here to help Monday - Friday from 8:00am to 7:30pm and 9:00am - 12:00pm on Saturdays.  Please visit [email or web address]  for more " information.   Phone:  [phone] or [phone]    Please note that your emergency physician, surgeon, pathologist, radiologist, anesthesiologist, and other specialists are not employed by [facility] and will therefore bill separately for their services.  Please contact them directly for any questions concerning their bills at the numbers below:     Emergency Physician Services:  [phone]  [facility]:  [phone]  [facility]:  [phone]  [facility]:  [phone]    1. Your final bill may vary from the amount quoted upon discharge if all procedures are not complete at that time, or if your doctor has additional procedures of which we are not aware. You will receive an additional bill if you return to the Emergency Department at [facility] for suture removal regardless of the facility of which the sutures were placed.     2. Please arrange for settlement of this account at the emergency registration.    3. All self-pay accounts are due in full at the time of treatment.  If you are unable to meet this obligation then payment is expected within 4-5 days.     4. If you have had radiology studies (CT, X-ray, Ultrasound, MRI), you have received a preliminary result during your emergency department visit. Please contact the radiology department [phone] to receive a copy of your final result. Please discuss the Final result with your primary physician or with the follow up physician provided.     Crisis Hotline:  [place] Crisis Hotline:  8-254-KTLBBAH or 1-170.650.1742  [place] Crisis Hotline:    1-528.154.5845 or 883-787-6435         ED Discharge Follow Up Questions    1. In order to provide you with very good care, we would like to follow up with a phone call in the next few days.  May we have your permission to contact you?     YES /  NO    2. What is the best phone number to call you? (       )_____-__________    3. What is the best time to call  you?      Morning  /  Afternoon  /  Evening                   Patient Signature:  ____________________________________________________________    Date:  ____________________________________________________________      Your appointments     Mar 29, 2017  1:00 PM   GYN Visit with MARIN RICHARDSON   [facility]    [address] [place] [address] [address]  [facility] [address] [phone]

## 2017-03-26 NOTE — DISCHARGE INSTRUCTIONS
" Viral Illness    Take ibuprofen 600-800 mg every 6 hours or naproxen 500 mg every 12 hours for pain and fever. Use hydrocodone syrup for cough Return for ill appearance or shortness of breath.  See a doctor if not better or worsening after 5-6 days of fever or other significant symptoms. Stop smoking tobacco.    You had a borderline or high normal blood pressure reading today.  This does not necessarily mean you have hypertension.  Please followup with your/a primary physician for comprehensive blood pressure evaluation and yearly fasting cholesterol assessment.  BP Readings from Last 3 Encounters:   03/26/17 147/93   02/17/17 130/86   01/15/17 117/84     .    Your exam indicates you have a viral illness.  Typical symptoms of virus infections include: fever, muscle aches, headache, fatigue, stomach upsets, sore throat, and dry cough. Antibiotic drugs are not effective in viral illnesses; they are only given when there is a secondary bacterial infection.    General treatment includes bed rest, increasing oral fluid intake of clear, non-caffeinated drinks like ginger ale, fruit juices, water, or sports (electrolyte) drinks, and medicine to relieve specific symptoms such as cough, pain, or diarrhea.  Acetaminophen (Tylenol) or ibuprofen may be used to help control fever and pain.      Please call your doctor if you are not better after 2-3 days of symptom treatment.  Call or return here right away if your illness gets more severe, or you develop any other new symptoms, such as a fever above 103 F, vomiting for more than a day, severe headache or other pain, stiff neck, trouble breathing, visual problems, \"blackouts\" or fainting.    Document Released: 12/18/2006    [employer]® Patient Information ©2008 [employer].    "

## 2017-03-26 NOTE — ED PROVIDER NOTES
"ED Provider Note    CHIEF COMPLAINT  Chief Complaint   Patient presents with   • Cough     x 3 days        HPI  Earnestine Knight is a 22 y.o. female who presents for 3 days of cough sore throat and rhinorrhea. Some shortness of breath with exertion. No chest pain. Smokes tobacco. No asthma. Positive ill contacts. Received influenza vaccine. No body aches.    REVIEW OF SYSTEMS  Pertinent positives include: Cough, sore throat, tobacco use, rhinorrhea.  Pertinent negatives include: Body aches, vomiting, diarrhea, pregnancy, breast-feeding.    PAST MEDICAL HISTORY  Past Medical History   Diagnosis Date   • Depression    • Anxiety    • History of  x 3 needs another csections 2016   • SIDS (sudden infant death syndrome) with last baby at 6 weeks of age 2016   • Tobacco dependence says cut down to 5 cigarettes/day 2016       SOCIAL HISTORY  Smokes up to 2 packs of tobacco a day.    CURRENT MEDICATIONS  Home Medications     Reviewed by Bere Richard R.N. (Registered Nurse) on 17 at 1448  Med List Status: Complete    Medication Last Dose Status          Patient Neal Taking any Medications                        ALLERGIES  No Known Allergies    PHYSICAL EXAM  VITAL SIGNS: /93 mmHg  Pulse 92  Temp(Src) 36.8 °C (98.2 °F)  Resp 16  Ht 1.6 m (5' 3\")  Wt 105 kg (231 lb 7.7 oz)  BMI 41.02 kg/m2  SpO2 96%  LMP 2017  Breastfeeding? No  Constitutional :  Well developed, Well nourished, elevated blood pressure, no hypoxia on room air well-appearing.   HNT: Oropharynx moist without erythema or exudate.   Ears: External ears normal.  Eyes: pupils reactive without eye discharge nor conjunctival hyperemia.  Neck: Normal range of motion, No tenderness, Supple, No stridor.   Lymphatic: No adenopathy.   Cardiovascular: Regular rhythm, No murmurs, No rubs, No gallops.  No cyanosis.   Respiratory: No rales, rhonchi, wheeze, cough  Abdomen:  Soft, nontender  Skin: Warm, dry, no " erythema, no rash.   Musculoskeletal: no limb deformities.      COURSE & MEDICAL DECISION MAKING  This patient has borderline or elevated blood pressure as recorded above and was instructed to followup with primary physician for comprehensive blood pressure evaluation and yearly fasting cholesterol assessment according to to CMS protocol.      Patient given albuterol with no subjective benefit. On repeat auscultation no change in pulmonary exam.    Appearing patient presents with a viral syndrome without evidence of hypoxia or bronchospasm or pneumonia. She received an influenza vaccine. At this point x-rays will not change treatment.      PLAN:  Hydrocodone syrup  Prescription monitoring access  Viral syndrome handout  Follow-up if not better 4 days [facility]  Return for shortness of breath really appearance  Discontinued tobacco use  Nicotine patches    CONDITION:  Good.    FINAL IMPRESSION:  1. Viral syndrome    2. Tobacco abuse    3.      Brief tobacco cessation counseling      Electronically signed by: Ivan Pedraza, 3/26/2017

## 2017-03-26 NOTE — ED NOTES
Received orders for DC. VSS. Educated on establishing PCP with [facility]. Educated regarding cough syrup with codeine. All questions addressed. Ambulatory to lobby with steady gait.

## 2017-03-26 NOTE — ED AVS SNAPSHOT
[employer] Access Code: [email or web address]  Expires: 4/25/2017  3:37 PM    Your email address is not on file at [facility].  Email Addresses are required for you to sign up for [employer], please contact [phone] to verify your personal information and to provide your email address prior to attempting to register for [employer].    Earnestine Knight  [address] [place] [address]    [employer]  A secure, online tool to manage your health information     [facility]’s [employer] Systems® is a secure, online tool that connects you to your personalized health information from the privacy of your home -- day or night - making it very easy for you to manage your healthcare. Once the activation process is completed, you can even access your medical information using the [employer] Systems isrrael, which is available for free in the Apple Isrrael store or Google Play store.     To learn more about DRB Systems, visit [email or web address]    There are two levels of access available (as shown below):   My Chart Features  [facility] Primary Care Doctor [facility]  Specialists [facility]  Urgent  Care Non-[facility] Primary Care Doctor   Email your healthcare team securely and privately 24/7 X X X    Manage appointments: schedule your next appointment; view details of past/upcoming appointments X      Request prescription refills. X      View recent personal medical records, including lab and immunizations X X X X   View health record, including health history, allergies, medications X X X X   Read reports about your outpatient visits, procedures, consult and ER notes X X X X   See your discharge summary, which is a recap of your hospital and/or ER visit that includes your diagnosis, lab results, and care plan X X  X     How to register for [employer] Systems:  Once your e-mail address has been verified, follow the following steps to sign up for [employer] Systems.     1. Go to  [email or web address]  2. Click on the Sign Up Now box, which takes you to the New Member Sign Up page. You  will need to provide the following information:  a. Enter your [employer] Access Code exactly as it appears at the top of this page. (You will not need to use this code after you’ve completed the sign-up process. If you do not sign up before the expiration date, you must request a new code.)   b. Enter your date of birth.   c. Enter your home email address.   d. Click Submit, and follow the next screen’s instructions.  3. Create a Mobile Active Defenset ID. This will be your [employer] login ID and cannot be changed, so think of one that is secure and easy to remember.  4. Create a [employer] password. You can change your password at any time.  5. Enter your Password Reset Question and Answer. This can be used at a later time if you forget your password.   6. Enter your e-mail address. This allows you to receive e-mail notifications when new information is available in [employer].  7. Click Sign Up. You can now view your health information.    For assistance activating your [employer] account, call [phone]

## 2017-03-26 NOTE — ED AVS SNAPSHOT
3/26/2017          Earnestine Knight  [address] [place] [address]    Dear Earnestine:    [facility] wants to ensure your discharge home is safe and you or your loved ones have had all your questions answered regarding your care after you leave the hospital.    You may receive a telephone call within two days of your discharge.  This call is to make certain you understand your discharge instructions as well as ensure we provided you with the best care possible during your stay with us.     The call will only last approximately 3-5 minutes and will be done by a nurse.    Once again, we want to ensure your discharge home is safe and that you have a clear understanding of any next steps in your care.  If you have any questions or concerns, please do not hesitate to contact us, we are here for you.  Thank you for choosing [facility] for your healthcare needs.    Sincerely,    Kris Archibald    [facility]

## 2017-04-26 ENCOUNTER — NON-PROVIDER VISIT (OUTPATIENT)
Dept: OBGYN | Facility: CLINIC | Age: 22
End: 2017-04-26
Payer: MEDICAID

## 2017-04-26 DIAGNOSIS — Z32.02 PREGNANCY EXAMINATION OR TEST, NEGATIVE RESULT: ICD-10-CM

## 2017-04-26 LAB
INT CON NEG: NEGATIVE
INT CON POS: POSITIVE
POC URINE PREGNANCY TEST: NEGATIVE

## 2017-04-26 PROCEDURE — 81025 URINE PREGNANCY TEST: CPT | Performed by: OBSTETRICS & GYNECOLOGY

## 2017-05-02 ENCOUNTER — GYNECOLOGY VISIT (OUTPATIENT)
Dept: OBGYN | Facility: CLINIC | Age: 22
End: 2017-05-02
Payer: MEDICAID

## 2017-05-02 VITALS — SYSTOLIC BLOOD PRESSURE: 134 MMHG | BODY MASS INDEX: 41.46 KG/M2 | WEIGHT: 234 LBS | DIASTOLIC BLOOD PRESSURE: 88 MMHG

## 2017-05-02 DIAGNOSIS — Z30.013 ENCOUNTER FOR INITIAL PRESCRIPTION OF INJECTABLE CONTRACEPTIVE: ICD-10-CM

## 2017-05-02 DIAGNOSIS — Z11.3 SCREENING FOR VENEREAL DISEASE: ICD-10-CM

## 2017-05-02 PROCEDURE — 99213 OFFICE O/P EST LOW 20 MIN: CPT | Performed by: OBSTETRICS & GYNECOLOGY

## 2017-05-02 RX ORDER — PROPRANOLOL HYDROCHLORIDE 10 MG/1
1 TABLET ORAL 2 TIMES DAILY
COMMUNITY
End: 2018-09-05

## 2017-05-02 RX ORDER — RISPERIDONE 1 MG/1
1 TABLET ORAL 2 TIMES DAILY
COMMUNITY
End: 2018-08-14

## 2017-05-02 RX ORDER — MEDROXYPROGESTERONE ACETATE 150 MG/ML
150 INJECTION, SUSPENSION INTRAMUSCULAR ONCE
Qty: 1 SYRINGE | Refills: 3 | Status: SHIPPED | OUTPATIENT
Start: 2017-05-02 | End: 2017-05-02

## 2017-05-02 NOTE — NON-PROVIDER
Pt here to discuss the depo shot.  # [phone]  Pt states concerned about possible STD would like to be checked.

## 2017-05-02 NOTE — MR AVS SNAPSHOT
Earnestine Freedman Antonia   2017 9:00 AM   Gynecology Visit   MRN: [id number]    Department[facility]  [facility]   Dept Phone:  [phone]    Description:  Female : 1995   Provider:  Shira Bashir M.D.           Reason for Visit     Contraception IUD consult      Allergies as of 2017     No Known Allergies      You were diagnosed with     Encounter for initial prescription of injectable contraceptive   [163959]       Screening for venereal disease   [351786]         Vital Signs     Blood Pressure Weight Last Menstrual Period Smoking Status          134/88 mmHg 106.142 kg (234 lb) 04/10/2017 Current Every Day Smoker        Basic Information     Date Of Birth Sex Race Ethnicity Preferred Language    1995 Female White Non- English      Problem List              ICD-10-CM Priority Class Noted - Resolved    SIDS (sudden infant death syndrome) with last baby at 6 weeks of age R99   2016 - Present    Tobacco dependence says cut down to 5 cigarettes/day F17.200   2016 - Present      Health Maintenance        Date Due Completion Dates    IMM HEP B VACCINE (1 of 3 - Primary Series) 1993 ---    IMM HEP A VACCINE (1 of 2 - Standard Series) 1994 ---    IMM HPV VACCINE (1 of 3 - Female 3 Dose Series) 2004 ---    IMM VARICELLA (CHICKENPOX) VACCINE (1 of 2 - 2 Dose Adolescent Series) 2006 ---    PAP SMEAR 2019    IMM DTaP/Tdap/Td Vaccine (2 - Td) 11/10/2026 11/10/2016, 2015            Current Immunizations     Influenza Vaccine Quad Inj (Preserved) 2015  5:03 AM    Pneumococcal polysaccharide vaccine (PPSV-23) 2015  5:59 PM    Tdap Vaccine 11/10/2016  9:11 AM, 2015  5:04 AM      Below and/or attached are the medications your provider expects you to take. Review all of your home medications and newly ordered medications with your provider and/or pharmacist. Follow medication instructions as directed by your provider and/or  pharmacist. Please keep your medication list with you and share with your provider. Update the information when medications are discontinued, doses are changed, or new medications (including over-the-counter products) are added; and carry medication information at all times in the event of emergency situations     Allergies:  No Known Allergies          Medications  Valid as of: May 02, 2017 -  9:56 AM    Generic Name Brand Name Tablet Size Instructions for use    Hydrocodone-Acetaminophen (Solution) HYCET 7.5-325 MG/15ML Take 10 mL by mouth 4 times a day as needed for Cough.        MedroxyPROGESTERone Acetate (Suspension) DEPO-PROVERA 150 MG/ML 1 mL by Intramuscular route Once for 1 dose.        Nicotine (PATCH 24 HR) NICODERM 7 MG/24HR Apply 1 Patch to skin as directed every 24 hours.        Nicotine (PATCH 24 HR) NICODERM 7 MG/24HR Apply 1 Patch to skin as directed every 24 hours.        Propranolol HCl (Tab) INDERAL 10 MG Take 1 mg by mouth 2 times a day.        RisperiDONE (Tab) RISPERDAL 1 MG Take 1 mg by mouth 2 times a day.        .                 Medicines prescribed today were sent to:     [facility]/PHARMACY #[id number] - [place], [place] - [address]    [address] [place] [address]    Phone: [phone] Fax: [phone]    Open 24 Hours?: No      Medication refill instructions:       If your prescription bottle indicates you have medication refills left, it is not necessary to call your provider’s office. Please contact your pharmacy and they will refill your medication.    If your prescription bottle indicates you do not have any refills left, you may request refills at any time through one of the following ways: The online PowerPlan system (except Urgent Care), by calling your provider’s office, or by asking your pharmacy to contact your provider’s office with a refill request. Medication refills are processed only during regular business hours and may not be available until the next business day. Your provider  may request additional information or to have a follow-up visit with you prior to refilling your medication.   *Please Note: Medication refills are assigned a new Rx number when refilled electronically. Your pharmacy may indicate that no refills were authorized even though a new prescription for the same medication is available at the pharmacy. Please request the medicine by name with the pharmacy before contacting your provider for a refill.        Your To Do List     Future Labs/Procedures Complete By Expires    CHLAMYDIA/GC PCR URINE OR SWAB  As directed 5/3/2018         [employer] Access Code: [email or web address]  Expires: 5/26/2017  1:40 PM    Kang Hui Medical Instrument  A secure, online tool to manage your health information     [employer]’s Kang Hui Medical Instrument® is a secure, online tool that connects you to your personalized health information from the privacy of your home -- day or night - making it very easy for you to manage your healthcare. Once the activation process is completed, you can even access your medical information using the Kang Hui [employer] Instrument isrrael, which is available for free in the Apple Isrrael store or Google Play store.     [employer] Instrument provides the following levels of access (as shown below):   My Chart Features   [facility] Primary Care Doctor [facility]  Specialists [facility]  Urgent  Care Non-[facility]  Primary Care  Doctor   Email your healthcare team securely and privately 24/7 X X X    Manage appointments: schedule your next appointment; view details of past/upcoming appointments X      Request prescription refills. X      View recent personal medical records, including lab and immunizations X X X X   View health record, including health history, allergies, medications X X X X   Read reports about your outpatient visits, procedures, consult and ER notes X X X X   See your discharge summary, which is a recap of your hospital and/or ER visit that includes your diagnosis, lab results, and care plan. X X       How to register for [employer] Instrument:  1. Go to   [email or web address].  2. Click on the Sign Up Now box, which takes you to the New Member Sign Up page. You will need to provide the following information:  a. Enter your VoxPop Clothing Access Code exactly as it appears at the top of this page. (You will not need to use this code after you’ve completed the sign-up process. If you do not sign up before the expiration date, you must request a new code.)   b. Enter your date of birth.   c. Enter your home email address.   d. Click Submit, and follow the next screen’s instructions.  3. Create a VoxPop Clothing ID. This will be your VoxPop Clothing login ID and cannot be changed, so think of one that is secure and easy to remember.  4. Create a VoxPop Clothing password. You can change your password at any time.  5. Enter your Password Reset Question and Answer. This can be used at a later time if you forget your password.   6. Enter your e-mail address. This allows you to receive e-mail notifications when new information is available in VoxPop Clothing.  7. Click Sign Up. You can now view your health information.    For assistance activating your VoxPop Clothing account, call [phone]        Quit Tobacco Information     Do you want to quit using tobacco?    Quitting tobacco decreases risks of cancer, heart and lung disease, increases life expectancy, improves sense of taste and smell, and increases spending money, among other benefits.    If you are thinking about quitting, we can help.  • [facility] Quit Tobacco Program: 354.400.6152  o Program occurs weekly for four weeks and includes pharmacist consultation on products to support quitting smoking or chewing tobacco. A provider referral is needed for pharmacist consultation.  • Tobacco Users Help Hotline: 7-800-QUIT-NOW (505-2147) or https://[place].quitlogix.org/  o Free, confidential telephone and online coaching for [place] residents. Sessions are designed on a schedule that is convenient for you. Eligible clients receive free nicotine replacement  therapy.  • Nationally: www.smokefree.gov  o Information and professional assistance to support both immediate and long-term needs as you become, and remain, a non-smoker. Smokefree.gov allows you to choose the help that best fits your needs.

## 2017-05-02 NOTE — PROGRESS NOTES
Earnestine Hernandezham22 y.o.  female presents for contraceptive counseling  Patient is currently without complaints. She reports regular monthly menstrual cycles. She denies breakthrough bleeding or spotting. She denies any excessive pain associated with her menses. She also denies pelvic pain or pressure when she is not on her menses.      ROS: Patient is feeling well. No dyspnea or chest pain on exertion. No Abdominal pain, change in bowel habits, black or bloody stools. No urinary sx. GYN ROS:normal menses, no abnormal bleeding, pelvic pain or discharge, no breast pain or new or enlarging lumps on self exam, she complains of vaginal discharge and a new sexual partner. Denies breast tenderness, mass, discharge, changes in size or contour, or abnormal cyclic discomfort. No neurological complaints.  Past Medical History   Diagnosis Date   • History of  x 3 needs another csections 2016   • SIDS (sudden infant death syndrome) with last baby at 6 weeks of age 2016   • Tobacco dependence says cut down to 5 cigarettes/day 2016   • Anxiety      currently on medications.    • Depression    • Migraine    • Hypertension 2016     [facility]       Allergy:   Review of patient's allergies indicates no known allergies.    LMP:       Patient's last menstrual period was 04/10/2017. .     Menstrual History: menses regular every 30 days  Contraceptive Method:none    Pap history, the patient denies abnormal Pap smears and denies   sexually transmitted diseases    Mammo:too young    Objective : The patient appears well, alert and oriented x 3, in no acute distress.  Blood pressure 134/88, weight 106.142 kg (234 lb), last menstrual period 04/10/2017, not currently breastfeeding.  HEENT Exam: EOMI, SUSAN, no adenopathy or thyromegaly.  Lungs: Clear to Auscultation   Cor: S1 and S2 normal, no murmurs, or rubs   Abdomen: Soft without tenderness, guarding mass or organomegaly.  Extremities: No edema,  pulses equal  Neurological: Normal No focal signs  Breast Exam:Inspection negative. No nipple discharge or bleeding. No masses or nodularity palpable  Pelvic: External genitalia,urethral meatus, urethra, bladder and vagina normal. Cervix, uterus and adnexa intact and normal.  Anus and perineum normal. Bimanual and rectovaginal without masses or tenderness.    Lab:  Recent Results (from the past 336 hour(s))   POCT Pregnancy    Collection Time: 04/26/17 12:00 PM   Result Value Ref Range    POC Urine Pregnancy Test negative Negative    Internal Control Positive Positive     Internal Control Negative Negative        Assessment:  no contraindication to begin hormonal therapy    Plan:counseled on breast self exam and STD prevention  GC and Chlamydia cultures are done secondary to complaints of vaginal discharge and new sexual partner  Contraception:Depo Provera    Today I had a discussion with the patient regarding birth control methods. The patient desires Depo-Provera at this time. I discussed with the patient Depo-Provera is a progesterone only form of birth control that requires an injection every 3 months. Side effects of the Depo-Provera can be amenorrhea, irregular spotting or worsening of menstrual cycle. I also discussed the potential for weight gain associated with Depo-Provera, 10% of women will be 10 pounds or more will using Depo-Provera Depo-Provera also may worsen migraines if the patient is prone to migraines.  After discussion of Depo-Provera patient elected to proceed with injection  Discussed procedure for obtaining Depo-Provera we'll send the patient's pharmacy and she can come back here to have Depo-Provera injection within the first 5 days of her menstrual cycle after a negative urine pregnancy test

## 2017-05-03 LAB
C TRACH DNA SPEC QL NAA+PROBE: NORMAL
N GONORRHOEA DNA SPEC QL NAA+PROBE: NORMAL

## 2017-06-12 ENCOUNTER — NON-PROVIDER VISIT (OUTPATIENT)
Dept: OBGYN | Facility: CLINIC | Age: 22
End: 2017-06-12
Payer: MEDICAID

## 2017-06-12 NOTE — MR AVS SNAPSHOT
Earnestine Knight   2017 3:00 PM   Non-Provider Visit   MRN: [id number]    Department[facility]  [facility]   Dept Phone:  [phone]    Description:  Female : 1995   Provider:  MARIN NURSE           Reason for Visit     Other depo injection      Allergies as of 2017     No Known Allergies      Vital Signs     Smoking Status                   Current Every Day Smoker           Basic Information     Date Of Birth Sex Race Ethnicity Preferred Language    1995 Female White Non- English      Problem List              ICD-10-CM Priority Class Noted - Resolved    SIDS (sudden infant death syndrome) with last baby at 6 weeks of age R99   2016 - Present    Tobacco dependence says cut down to 5 cigarettes/day F17.200   2016 - Present      Health Maintenance        Date Due Completion Dates    IMM HEP B VACCINE (1 of 3 - Primary Series) 1993 ---    IMM HEP A VACCINE (1 of 2 - Standard Series) 1994 ---    IMM HPV VACCINE (1 of 3 - Female 3 Dose Series) 2004 ---    IMM VARICELLA (CHICKENPOX) VACCINE (1 of 2 - 2 Dose Adolescent Series) 2006 ---    PAP SMEAR 2019    IMM DTaP/Tdap/Td Vaccine (2 - Td) 11/10/2026 11/10/2016, 2015            Current Immunizations     Influenza Vaccine Quad Inj (Preserved) 2015  5:03 AM    Pneumococcal polysaccharide vaccine (PPSV-23) 2015  5:59 PM    Tdap Vaccine 11/10/2016  9:11 AM, 2015  5:04 AM      Below and/or attached are the medications your provider expects you to take. Review all of your home medications and newly ordered medications with your provider and/or pharmacist. Follow medication instructions as directed by your provider and/or pharmacist. Please keep your medication list with you and share with your provider. Update the information when medications are discontinued, doses are changed, or new medications (including over-the-counter products) are added; and carry medication  information at all times in the event of emergency situations     Allergies:  No Known Allergies          Medications  Valid as of: June 12, 2017 -  3:43 PM    Generic Name Brand Name Tablet Size Instructions for use    Hydrocodone-Acetaminophen (Solution) HYCET 7.5-325 MG/15ML Take 10 mL by mouth 4 times a day as needed for Cough.        Nicotine (PATCH 24 HR) NICODERM 7 MG/24HR Apply 1 Patch to skin as directed every 24 hours.        Nicotine (PATCH 24 HR) NICODERM 7 MG/24HR Apply 1 Patch to skin as directed every 24 hours.        Propranolol HCl (Tab) INDERAL 10 MG Take 1 mg by mouth 2 times a day.        RisperiDONE (Tab) RISPERDAL 1 MG Take 1 mg by mouth 2 times a day.        .                 Medicines prescribed today were sent to:     [facility]/PHARMACY #[id number] - [place], [place] - [address] [facility]    [address] [place] [address]    Phone: [phone] Fax: [phone]    Open 24 Hours?: No      Medication refill instructions:       If your prescription bottle indicates you have medication refills left, it is not necessary to call your provider’s office. Please contact your pharmacy and they will refill your medication.    If your prescription bottle indicates you do not have any refills left, you may request refills at any time through one of the following ways: The online VersionEye system (except Urgent Care), by calling your provider’s office, or by asking your pharmacy to contact your provider’s office with a refill request. Medication refills are processed only during regular business hours and may not be available until the next business day. Your provider may request additional information or to have a follow-up visit with you prior to refilling your medication.   *Please Note: Medication refills are assigned a new Rx number when refilled electronically. Your pharmacy may indicate that no refills were authorized even though a new prescription for the same medication is available at the pharmacy. Please request the  medicine by name with the pharmacy before contacting your provider for a refill.           Nujira Access Code: [email or web address]  Expires: 7/12/2017  3:43 PM    Nujira  A secure, online tool to manage your health information     [employer]’s Nujira® is a secure, online tool that connects you to your personalized health information from the privacy of your home -- day or night - making it very easy for you to manage your healthcare. Once the activation process is completed, you can even access your medical information using the Nujira isrrael, which is available for free in the Apple Isrrael store or Google Play store.     Nujira provides the following levels of access (as shown below):   My Chart Features   [facility] Primary Care Doctor [facility]  Specialists [facility]  Urgent  Care Non-[facility]  Primary Care  Doctor   Email your healthcare team securely and privately 24/7 X X X    Manage appointments: schedule your next appointment; view details of past/upcoming appointments X      Request prescription refills. X      View recent personal medical records, including lab and immunizations X X X X   View health record, including health history, allergies, medications X X X X   Read reports about your outpatient visits, procedures, consult and ER notes X X X X   See your discharge summary, which is a recap of your hospital and/or ER visit that includes your diagnosis, lab results, and care plan. X X       How to register for Nujira:  1. Go to  [email or web address].  2. Click on the Sign Up Now box, which takes you to the New Member Sign Up page. You will need to provide the following information:  a. Enter your Nujira Access Code exactly as it appears at the top of this page. (You will not need to use this code after you’ve completed the sign-up process. If you do not sign up before the expiration date, you must request a new code.)   b. Enter your date of birth.   c. Enter your home email address.   d. Click Submit, and  follow the next screen’s instructions.  3. Create a Qianmi ID. This will be your Qianmi login ID and cannot be changed, so think of one that is secure and easy to remember.  4. Create a Qianmi password. You can change your password at any time.  5. Enter your Password Reset Question and Answer. This can be used at a later time if you forget your password.   6. Enter your e-mail address. This allows you to receive e-mail notifications when new information is available in Qianmi.  7. Click Sign Up. You can now view your health information.    For assistance activating your Qianmi account, call [phone]        Quit Tobacco Information     Do you want to quit using tobacco?    Quitting tobacco decreases risks of cancer, heart and lung disease, increases life expectancy, improves sense of taste and smell, and increases spending money, among other benefits.    If you are thinking about quitting, we can help.  • [facility] Quit Tobacco Program: 138.981.1373  o Program occurs weekly for four weeks and includes pharmacist consultation on products to support quitting smoking or chewing tobacco. A provider referral is needed for pharmacist consultation.  • Tobacco Users Help Hotline: 4-186-QUIT-NOW (857-9497) or https://[place].quitlogix.org/  o Free, confidential telephone and online coaching for [place] residents. Sessions are designed on a schedule that is convenient for you. Eligible clients receive free nicotine replacement therapy.  • Nationally: www.smokefree.gov  o Information and professional assistance to support both immediate and long-term needs as you become, and remain, a non-smoker. Smokefree.gov allows you to choose the help that best fits your needs.

## 2017-08-09 ENCOUNTER — HOSPITAL ENCOUNTER (EMERGENCY)
Facility: MEDICAL CENTER | Age: 22
End: 2017-08-09
Attending: EMERGENCY MEDICINE
Payer: MEDICAID

## 2017-08-09 ENCOUNTER — APPOINTMENT (OUTPATIENT)
Dept: RADIOLOGY | Facility: MEDICAL CENTER | Age: 22
End: 2017-08-09
Attending: EMERGENCY MEDICINE
Payer: MEDICAID

## 2017-08-09 VITALS
WEIGHT: 251.32 LBS | OXYGEN SATURATION: 96 % | SYSTOLIC BLOOD PRESSURE: 133 MMHG | DIASTOLIC BLOOD PRESSURE: 88 MMHG | TEMPERATURE: 98.3 F | RESPIRATION RATE: 18 BRPM | HEART RATE: 92 BPM | HEIGHT: 63 IN | BODY MASS INDEX: 44.53 KG/M2

## 2017-08-09 DIAGNOSIS — Z72.0 TOBACCO ABUSE: ICD-10-CM

## 2017-08-09 DIAGNOSIS — J02.9 SORE THROAT: ICD-10-CM

## 2017-08-09 DIAGNOSIS — S93.401A SPRAIN OF RIGHT ANKLE, UNSPECIFIED LIGAMENT, INITIAL ENCOUNTER: ICD-10-CM

## 2017-08-09 DIAGNOSIS — R06.02 SOB (SHORTNESS OF BREATH): ICD-10-CM

## 2017-08-09 PROCEDURE — A9270 NON-COVERED ITEM OR SERVICE: HCPCS | Performed by: EMERGENCY MEDICINE

## 2017-08-09 PROCEDURE — 73610 X-RAY EXAM OF ANKLE: CPT | Mod: RT

## 2017-08-09 PROCEDURE — 99284 EMERGENCY DEPT VISIT MOD MDM: CPT

## 2017-08-09 PROCEDURE — 700102 HCHG RX REV CODE 250 W/ 637 OVERRIDE(OP): Performed by: EMERGENCY MEDICINE

## 2017-08-09 RX ORDER — DEXAMETHASONE 4 MG/1
8 TABLET ORAL ONCE
Status: COMPLETED | OUTPATIENT
Start: 2017-08-09 | End: 2017-08-09

## 2017-08-09 RX ORDER — ALBUTEROL SULFATE 90 UG/1
4 AEROSOL, METERED RESPIRATORY (INHALATION) ONCE
Status: COMPLETED | OUTPATIENT
Start: 2017-08-09 | End: 2017-08-09

## 2017-08-09 RX ORDER — ARIPIPRAZOLE 20 MG/1
20 TABLET ORAL DAILY
COMMUNITY
End: 2018-01-22

## 2017-08-09 RX ORDER — MEDROXYPROGESTERONE ACETATE 150 MG/ML
150 INJECTION, SUSPENSION INTRAMUSCULAR
COMMUNITY
End: 2017-12-19

## 2017-08-09 RX ADMIN — DEXAMETHASONE 8 MG: 4 TABLET ORAL at 17:32

## 2017-08-09 RX ADMIN — ALBUTEROL SULFATE 4 PUFF: 90 AEROSOL, METERED RESPIRATORY (INHALATION) at 17:32

## 2017-08-09 NOTE — ED AVS SNAPSHOT
8/9/2017    Earnestine Knight  [address] [place] [id number]    Dear Earnestine:    [facility] wants to ensure your discharge home is safe and you or your loved ones have had all of your questions answered regarding your care after you leave the hospital.    Below is a list of resources and contact information should you have any questions regarding your hospital stay, follow-up instructions, or active medical symptoms.    Questions or Concerns Regarding… Contact   Medical Questions Related to Your Discharge  (7 days a week, 8am-5pm) Contact a Nurse Care Coordinator   [phone]   Medical Questions Not Related to Your Discharge  (24 hours a day / 7 days a week)  Contact the Nurse Health Line   [phone]    Medications or Discharge Instructions Refer to your discharge packet   or contact your [facility] Primary Care Provider   [phone]   Follow-up Appointment(s) Schedule your appointment via Zartis   or contact Scheduling [phone]   Billing Review your statement via Zartis  or contact Billing [phone]   Medical Records Review your records via Zartis   or contact Medical Records [phone]     You may receive a telephone call within two days of discharge. This call is to make certain you understand your discharge instructions and have the opportunity to have any questions answered. You can also easily access your medical information, test results and upcoming appointments via the Zartis free online health management tool. You can learn more and sign up at CueThink/Zartis. For assistance setting up your Zartis account, please call [phone].    Once again, we want to ensure your discharge home is safe and that you have a clear understanding of any next steps in your care. If you have any questions or concerns, please do not hesitate to contact us, we are here for you. Thank you for choosing [facility] for your healthcare needs.    Sincerely,    Your [facility] Healthcare Team

## 2017-08-09 NOTE — ED AVS SNAPSHOT
Home Care Instructions                                                                                                                Earnestine Knight   MRN: [id number]    Department:  [facility], Emergency Dept   Date of Visit:  8/9/2017            [facility], Emergency Dept    [address] [facility] [address]    Phone:  [phone]      You were seen by     Srinivas Ordaz M.D.      Your Diagnosis Was     Sprain of right ankle, unspecified ligament, initial encounter     S93.401A       These are the medications you received during your hospitalization from 08/09/2017 1632 to 08/09/2017 1815     Date/Time Order Dose Route Action    08/09/2017 1732 albuterol inhaler 4 Puff 4 Puff Inhalation Given    08/09/2017 1732 dexamethasone (DECADRON) tablet 8 mg 8 mg Oral Given      Follow-up Information     1. Follow up with [facility], Emergency Dept.    Specialty:  Emergency Medicine    Why:  If symptoms worsen    Contact information    [address] [place] [id number] [phone]        2. Follow up with Jesse Zee M.D..    Specialty:  Orthopaedics    Contact information    [address]  [address]  [facility] [address] [phone]        Medication Information     Review all of your home medications and newly ordered medications with your primary doctor and/or pharmacist as soon as possible. Follow medication instructions as directed by your doctor and/or pharmacist.     Please keep your complete medication list with you and share with your physician. Update the information when medications are discontinued, doses are changed, or new medications (including over-the-counter products) are added; and carry medication information at all times in the event of emergency situations.               Medication List      ASK your doctor about these medications        Instructions    Morning Afternoon Evening Bedtime    ABILIFY 20 MG  tablet   Generic drug:  aripiprazole        Take 20 mg by mouth every day.   Dose:  20 mg                        medroxyPROGESTERone 150 MG/ML Susp   Commonly known as:  DEPO-PROVERA        150 mg by Intramuscular route Every 3 Months.   Dose:  150 mg                        propranolol 10 MG Tabs   Commonly known as:  INDERAL        Take 1 mg by mouth 2 times a day.   Dose:  1 mg                        risperidone 1 MG Tabs   Commonly known as:  RISPERDAL        Take 1 mg by mouth 2 times a day.   Dose:  1 mg                                Procedures and tests performed during your visit     DX-ANKLE 3+ VIEWS RIGHT        Discharge Instructions       Please follow-up with your primary care provider for blood pressure management.        Ankle Sprain  An ankle sprain is an injury to the strong, fibrous tissues (ligaments) that hold your ankle bones together.   HOME CARE   · Put ice on your ankle for 1-2 days or as told by your doctor.  ¨ Put ice in a plastic bag.  ¨ Place a towel between your skin and the bag.  ¨ Leave the ice on for 15-20 minutes at a time, every 2 hours while you are awake.  · Only take medicine as told by your doctor.  · Raise (elevate) your injured ankle above the level of your heart as much as possible for 2-3 days.  · Use crutches if your doctor tells you to. Slowly put your own weight on the affected ankle. Use the crutches until you can walk without pain.  · If you have a plaster splint:  ¨ Do not rest it on anything harder than a pillow for 24 hours.  ¨ Do not put weight on it.  ¨ Do not get it wet.  ¨ Take it off to shower or bathe.  · If given, use an elastic wrap or support stocking for support. Take the wrap off if your toes lose feeling (numb), tingle, or turn cold or blue.  · If you have an air splint:  ¨ Add or let out air to make it comfortable.  ¨ Take it off at night and to shower and bathe.  ¨ Wiggle your toes and move your ankle up and down often while you are wearing it.  GET  HELP IF:  · You have rapidly increasing bruising or puffiness (swelling).  · Your toes feel very cold.  · You lose feeling in your foot.  · Your medicine does not help your pain.  GET HELP RIGHT AWAY IF:   · Your toes lose feeling (numb) or turn blue.  · You have severe pain that is increasing.  MAKE SURE YOU:   · Understand these instructions.  · Will watch your condition.  · Will get help right away if you are not doing well or get worse.     This information is not intended to replace advice given to you by your health care provider. Make sure you discuss any questions you have with your health care provider.     Document Released: 06/05/2009 Document Revised: 01/08/2016 Document Reviewed: 07/01/2013  [employer] Interactive Patient Education ©2016 [employer]            Patient Information     Patient Information    Following emergency treatment: all patient requiring follow-up care must return either to a private physician or a clinic if your condition worsens before you are able to obtain further medical attention, please return to the emergency room.     Billing Information    At [facility], we work to make the billing process streamlined for our patients.  Our Representatives are here to answer any questions you may have regarding your hospital bill.  If you have insurance coverage and have supplied your insurance information to us, we will submit a claim to your insurer on your behalf.  Should you have any questions regarding your bill, we can be reached online or by phone as follows:  Online: You are able pay your bills online or live chat with our representatives about any billing questions you may have. We are here to help Monday - Friday from 8:00am to 7:30pm and 9:00am - 12:00pm on Saturdays.  Please visit [email or web address]  for more information.   Phone:  [phone] or [phone]    Please note that your emergency physician, surgeon, pathologist,  radiologist, anesthesiologist, and other specialists are not employed by [facility] and will therefore bill separately for their services.  Please contact them directly for any questions concerning their bills at the numbers below:     Emergency Physician Services:  [phone]  [facility]:  [phone]  [facility]:  [phone]  [facility]:  [phone]    1. Your final bill may vary from the amount quoted upon discharge if all procedures are not complete at that time, or if your doctor has additional procedures of which we are not aware. You will receive an additional bill if you return to the Emergency Department at [facility] for suture removal regardless of the facility of which the sutures were placed.     2. Please arrange for settlement of this account at the emergency registration.    3. All self-pay accounts are due in full at the time of treatment.  If you are unable to meet this obligation then payment is expected within 4-5 days.     4. If you have had radiology studies (CT, X-ray, Ultrasound, MRI), you have received a preliminary result during your emergency department visit. Please contact the radiology department [phone] to receive a copy of your final result. Please discuss the Final result with your primary physician or with the follow up physician provided.     Crisis Hotline:  [place] Crisis Hotline:  0-982-JQLRGOI or 1-981.895.4762  [place] Crisis Hotline:    1-955.701.3351 or 476-461-3742         ED Discharge Follow Up Questions    1. In order to provide you with very good care, we would like to follow up with a phone call in the next few days.  May we have your permission to contact you?     YES /  NO    2. What is the best phone number to call you? (       )_____-__________    3. What is the best time to call you?      Morning  /  Afternoon  /  Evening                   Patient Signature:   ____________________________________________________________    Date:  ____________________________________________________________

## 2017-08-09 NOTE — ED AVS SNAPSHOT
[employer] Access Code: [email or web address]  Expires: 9/8/2017  6:15 PM    Your email address is not on file at [facility].  Email Addresses are required for you to sign up for [employer], please contact [phone] to verify your personal information and to provide your email address prior to attempting to register for [employer].    Earnestine Knihgt  [address] [place] [address]    [employer]  A secure, online tool to manage your health information     [facility]’s [employer]® is a secure, online tool that connects you to your personalized health information from the privacy of your home -- day or night - making it very easy for you to manage your healthcare. Once the activation process is completed, you can even access your medical information using the [employer] isrrael, which is available for free in the Apple Isrrael store or Google Play store.     To learn more about [employer], visit [email or web address]    There are two levels of access available (as shown below):   My Chart Features  [facility] Primary Care Doctor [facility]  Specialists [facility]  Urgent  Care Non-[facility] Primary Care Doctor   Email your healthcare team securely and privately 24/7 X X X    Manage appointments: schedule your next appointment; view details of past/upcoming appointments X      Request prescription refills. X      View recent personal medical records, including lab and immunizations X X X X   View health record, including health history, allergies, medications X X X X   Read reports about your outpatient visits, procedures, consult and ER notes X X X X   See your discharge summary, which is a recap of your hospital and/or ER visit that includes your diagnosis, lab results, and care plan X X  X     How to register for [employer]:  Once your e-mail address has been verified, follow the following steps to sign up for [employer].     1. Go to  [email or web address]  2. Click on the Sign Up Now box, which takes you to the New Member Sign Up page. You  will need to provide the following information:  a. Enter your [employer] Access Code exactly as it appears at the top of this page. (You will not need to use this code after you’ve completed the sign-up process. If you do not sign up before the expiration date, you must request a new code.)   b. Enter your date of birth.   c. Enter your home email address.   d. Click Submit, and follow the next screen’s instructions.  3. Create a [employer] ID. This will be your [employer] login ID and cannot be changed, so think of one that is secure and easy to remember.  4. Create a [employer] password. You can change your password at any time.  5. Enter your Password Reset Question and Answer. This can be used at a later time if you forget your password.   6. Enter your e-mail address. This allows you to receive e-mail notifications when new information is available in [employer].  7. Click Sign Up. You can now view your health information.    For assistance activating your [employer] account, call [phone]

## 2017-08-09 NOTE — ED NOTES
"Earnestine Knight 22 y.o. female   Ambulatory to triage.    Chief Complaint   Patient presents with   • T-5000 Extremity Pain     Rolled R ankle.  Now pain to amterior L lower leg.  Radiates up leg.   • Headache     x \"a few weeks.\"  States \"I think it may be my cigarrette smoking.\"  No attempts at OTC meds        Pt returned to lobby and educated on triage process.  Advised to notify RN with changes or concerns.    "

## 2017-08-09 NOTE — ED NOTES
Patient states yesterday she rolled her ankle and is now experiencing RLE pain. Pain worst in lateral right akle and radiates to right anterior shin. Patient states full ROM and sensation intact. Pain worse w/ ambulation and ROM. Pulse +2. Patient also complaining of sore throat/cough and headache.

## 2017-08-10 NOTE — ED NOTES
Patient dc'd to home with friend. Patient ambulatory w/ steady gait. Air Splint applied. Patient alert and oriented x 4. Patient verbalizes understanding of dc instructions and follow up care. Patient denies questions upon dc.

## 2017-08-10 NOTE — ED PROVIDER NOTES
"ED Provider Note    Scribed for Srinivas Ordaz M.D. by Rasheeda Arnold. 2017  5:06 PM    Primary care provider: Pcp Pt States None  Means of arrival: Walk-in  History obtained from: patient  History limited by: none    CHIEF COMPLAINT  Chief Complaint   Patient presents with   • T-5000 Extremity Pain     Rolled R ankle.  Now pain to amterior L lower leg.  Radiates up leg.   • Headache     x \"a few weeks.\"  States \"I think it may be my cigarrette smoking.\"  No attempts at OTC meds       HPI  Earnestine Knight is a 22 y.o. female who presents to the Emergency Department complaining of right lateral ankle pain onset just prior to arrival after falling down the stairs in her home.  The patient states that her foot rolled inward and her pain radiates up to her knee, not including her knee. She did not strike her head however does describe difficulty with ambulation secondary to pain.    Patient is also describing a sore throat and difficulty breathing, that she believes is secondary to her cigarette smoking. She has never been required to use an inhaler to aid her breathing. No complaints of right knee pain, runny nose, cough, fever, or allergy like symptoms.     REVIEW OF SYSTEMS  Pertinent positives include right lateral ankle pain, sore throat, difficulty breathing. Pertinent negatives include no right knee pain, runny nose, cough, fever, or allergy like symptoms. E.    PAST MEDICAL HISTORY   has a past medical history of History of  x 3 needs another csections (2016); SIDS (sudden infant death syndrome) with last baby at 6 weeks of age (2016); Tobacco dependence says cut down to 5 cigarettes/day (2016); Anxiety; Migraine; Hypertension (2016); Depression; PTSD (post-traumatic stress disorder); Anxiety disorder; and Bipolar disorder ([facility]).    SURGICAL HISTORY   has past surgical history that includes primary c section (); repeat c section (); repeat c section " "(11/3/2015); and repeat c section (1/11/2017).    SOCIAL HISTORY  Social History   Substance Use Topics   • Smoking status: Current Every Day Smoker -- 2.00 packs/day for 8 years     Types: Cigarettes   • Smokeless tobacco: Never Used      Comment: Pt states smokin 1/2 pack a day.   • Alcohol Use: No      History   Drug Use No       FAMILY HISTORY  No pertinent family history.    CURRENT MEDICATIONS  Home Medications     Reviewed by Margoth Roth R.N. (Registered Nurse) on 08/09/17 at 1641  Med List Status: Complete    Medication Last Dose Status    aripiprazole (ABILIFY) 20 MG tablet 8/9/2017 Active    propranolol (INDERAL) 10 MG Tab 8/9/2017 Active    risperidone (RISPERDAL) 1 MG Tab 8/9/2017 Active                ALLERGIES  No Known Allergies    PHYSICAL EXAM  VITAL SIGNS: /86 mmHg  Pulse 117  Temp(Src) 36.8 °C (98.3 °F)  Resp 16  Ht 1.6 m (5' 3\")  Wt 114 kg (251 lb 5.2 oz)  BMI 44.53 kg/m2  SpO2 100%  LMP     Constitutional: Well developed, Well nourished, no distress, Non-toxic appearance.   HENT: Normocephalic, Atraumatic, Bilateral external ears normal, Oropharynx moist, No oral exudates. Oropharynx with posterior nasal drip.  Eyes: PERRLA, EOMI, Conjunctiva normal, No discharge.   Neck: Normal ROM  Cardiovascular: Normal heart rate, Normal rhythm.   Thorax & Lungs: No respiratory distress, scant expiratory wheezing, No crackles.   Skin: Warm, Dry, No erythema, No rash.   Extremities:, Right ankle tenderness to palpation over lateral malleolus with slight tenderness to palpation and swelling to surrounding soft tissue, No cyanosis.   Musculoskeletal: Intact distal pulses  Neurologic: Awake, alert. Moves all extremities spontaneously.  Psychiatric: Affect normal, Judgment normal, Mood normal.       RADIOLOGY  DX-ANKLE 3+ VIEWS RIGHT   Final Result      Soft tissue swelling with no acute fracture identified.        The radiologist's interpretation of all radiological studies have been " reviewed by me.    COURSE & MEDICAL DECISION MAKING  Pertinent Labs & Imaging studies reviewed. (See chart for details)    5:06 PM - Patient seen and examined at bedside. Patient will be treated with 4 puffs of an Albuterol inhaler and 8mg Decadron tablet. Ordered right ankle x-ray to evaluate her symptoms.    6:00 PM I re-evalauted patient at bedside and informed her that no fractures were evident on her xray. She will be discharged.    Decision Making:  Slight headache and sore throats, shortness of breath, scant wheezing, ankle sprain, x-rays negative, per the patient and a ankle air splint. Have the patient follow up with orthopedics.     The patient will return for new or worsening symptoms and is stable at the time of discharge.    DISPOSITION:  Patient will be discharged home in stable condition.    FOLLOW UP:  [facility] Dept  [address] [facility] [id number] [phone]    If symptoms worsen    Jesse Zee M.D.  [address]  [address]  [facility] [address]  [phone]              FINAL IMPRESSION  1. Sprain of right ankle, unspecified ligament, initial encounter    2. Sore throat    3. SOB (shortness of breath)    4. Tobacco abuse          IRasheeda (Scribe), am scribing for, and in the presence of, Srinivas Ordaz M.D..    Electronically signed by: Rasheeda Arnold (Scribe), 8/9/2017    ISrinivas M.D. personally performed the services described in this documentation, as scribed by Rasheeda Arnold in my presence, and it is both accurate and complete.    The note accurately reflects work and decisions made by me.  Srinivas Ordaz  8/9/2017  9:44 PM

## 2017-08-10 NOTE — DISCHARGE INSTRUCTIONS
Please follow-up with your primary care provider for blood pressure management.        Ankle Sprain  An ankle sprain is an injury to the strong, fibrous tissues (ligaments) that hold your ankle bones together.   HOME CARE   · Put ice on your ankle for 1-2 days or as told by your doctor.  ¨ Put ice in a plastic bag.  ¨ Place a towel between your skin and the bag.  ¨ Leave the ice on for 15-20 minutes at a time, every 2 hours while you are awake.  · Only take medicine as told by your doctor.  · Raise (elevate) your injured ankle above the level of your heart as much as possible for 2-3 days.  · Use crutches if your doctor tells you to. Slowly put your own weight on the affected ankle. Use the crutches until you can walk without pain.  · If you have a plaster splint:  ¨ Do not rest it on anything harder than a pillow for 24 hours.  ¨ Do not put weight on it.  ¨ Do not get it wet.  ¨ Take it off to shower or bathe.  · If given, use an elastic wrap or support stocking for support. Take the wrap off if your toes lose feeling (numb), tingle, or turn cold or blue.  · If you have an air splint:  ¨ Add or let out air to make it comfortable.  ¨ Take it off at night and to shower and bathe.  ¨ Wiggle your toes and move your ankle up and down often while you are wearing it.  GET HELP IF:  · You have rapidly increasing bruising or puffiness (swelling).  · Your toes feel very cold.  · You lose feeling in your foot.  · Your medicine does not help your pain.  GET HELP RIGHT AWAY IF:   · Your toes lose feeling (numb) or turn blue.  · You have severe pain that is increasing.  MAKE SURE YOU:   · Understand these instructions.  · Will watch your condition.  · Will get help right away if you are not doing well or get worse.     This information is not intended to replace advice given to you by your health care provider. Make sure you discuss any questions you have with your health care provider.     Document Released: 06/05/2009 Document  Revised: 01/08/2016 Document Reviewed: 07/01/2013  [employer] Interactive Patient Education ©2016 [employer]

## 2017-09-01 ENCOUNTER — NON-PROVIDER VISIT (OUTPATIENT)
Dept: OBGYN | Facility: CLINIC | Age: 22
End: 2017-09-01
Payer: MEDICAID

## 2017-09-01 PROCEDURE — 96372 THER/PROPH/DIAG INJ SC/IM: CPT | Performed by: OBSTETRICS & GYNECOLOGY

## 2017-09-01 NOTE — NON-PROVIDER
Pt was here for a Depo-provera injection   2017  Next injection due 11/17 Dec 01 2017       NDC: [address]-9  LOT#: [id number]  Expiration Date: 2019  Dose: 1 ml   Site: Right Arm  Patient educated on use and side effects of medication. Name and  verified prior to injection. Pt tolerated? YES   Verified by Katie Berger   Administered by Melyssa Berger at 1:15 PM.  Patient Provided Medication: yes -

## 2017-10-05 ENCOUNTER — HOSPITAL ENCOUNTER (EMERGENCY)
Facility: MEDICAL CENTER | Age: 22
End: 2017-10-05
Attending: EMERGENCY MEDICINE
Payer: MEDICAID

## 2017-10-05 VITALS
HEART RATE: 84 BPM | RESPIRATION RATE: 18 BRPM | HEIGHT: 63 IN | DIASTOLIC BLOOD PRESSURE: 86 MMHG | TEMPERATURE: 97.5 F | SYSTOLIC BLOOD PRESSURE: 134 MMHG | WEIGHT: 276.9 LBS | OXYGEN SATURATION: 93 % | BODY MASS INDEX: 49.06 KG/M2

## 2017-10-05 DIAGNOSIS — B34.9 VIRAL SYNDROME: ICD-10-CM

## 2017-10-05 DIAGNOSIS — Z72.0 TOBACCO ABUSE: ICD-10-CM

## 2017-10-05 PROCEDURE — 700101 HCHG RX REV CODE 250: Performed by: EMERGENCY MEDICINE

## 2017-10-05 PROCEDURE — 99284 EMERGENCY DEPT VISIT MOD MDM: CPT

## 2017-10-05 PROCEDURE — 94760 N-INVAS EAR/PLS OXIMETRY 1: CPT

## 2017-10-05 PROCEDURE — 94640 AIRWAY INHALATION TREATMENT: CPT

## 2017-10-05 RX ORDER — ALBUTEROL SULFATE 90 UG/1
2 AEROSOL, METERED RESPIRATORY (INHALATION) EVERY 6 HOURS PRN
Qty: 1 INHALER | Refills: 0 | Status: SHIPPED | OUTPATIENT
Start: 2017-10-05 | End: 2018-01-22

## 2017-10-05 RX ORDER — NICOTINE 21 MG/24HR
1 PATCH, TRANSDERMAL 24 HOURS TRANSDERMAL EVERY 24 HOURS
Qty: 14 PATCH | Refills: 0 | Status: SHIPPED | OUTPATIENT
Start: 2017-10-05 | End: 2018-01-22

## 2017-10-05 RX ADMIN — ALBUTEROL SULFATE 2.5 MG: 2.5 SOLUTION RESPIRATORY (INHALATION) at 12:57

## 2017-10-05 ASSESSMENT — PAIN SCALES - GENERAL: PAINLEVEL_OUTOF10: 0

## 2017-10-05 NOTE — ED PROVIDER NOTES
"ED Provider Note    CHIEF COMPLAINT  Chief Complaint   Patient presents with   • Cough     X2 Days.  Productive with green sputum.   • Headache     x2 days       HPI  Earnestine Knight is a 22 y.o. female who presentsFor cough productive of green-brown sputum with nasal and sinus pain, headache for 2 days. She has some pleuritic chest pain with cough. She smokes tobacco but denies asthma or fever.    REVIEW OF SYSTEMS  Pertinent positives include: Cough, tobacco use.  Pertinent negatives include: Diabetes, immunocompromise, vomiting, diarrhea, ill contacts.    PAST MEDICAL HISTORY  Past Medical History:   Diagnosis Date   • History of  x 3 needs another csections 2016   • SIDS (sudden infant death syndrome) with last baby at 6 weeks of age 2016   • Tobacco dependence says cut down to 5 cigarettes/day 2016   • Hypertension     PIH   • Anxiety     currently on medications.    • Anxiety disorder    • Bipolar disorder (CMS-[facility])    • Depression    • Migraine    • PTSD (post-traumatic stress disorder)        SOCIAL HISTORY  Smokes tobacco.    CURRENT MEDICATIONS  None.    ALLERGIES  No Known Allergies    PHYSICAL EXAM  VITAL SIGNS: /87   Pulse (!) 116   Temp 36.4 °C (97.5 °F)   Resp 18   Ht 1.6 m (5' 3\")   Wt (!) 125.6 kg (276 lb 14.4 oz)   SpO2 93%   BMI 49.05 kg/m²   Constitutional :  Well developed, Well nourished, tachycardic, elevated blood pressure, no hypoxia room air, speaking full sentences, overweight  HNT: Oropharynx moist without erythema or exudate, no facial tenderness.   Ears: External ears normal.  Eyes: pupils reactive without eye discharge nor conjunctival hyperemia.  Neck: Normal range of motion, No tenderness, Supple, No stridor.   Lymphatic: No adenopathy.   Cardiovascular: Regular rhythm, No murmurs, No rubs, No gallops.  No cyanosis.   Respiratory: No rales, rhonchi, wheeze  Abdomen:  Soft, nontender  Skin: Warm, dry, no erythema, no rash. "   Musculoskeletal: no limb deformities.    COURSE & MEDICAL DECISION MAKING  Patient given an albuterol neb with subjective improvement in cough. On repeat auscultation there was no change. Brief tobacco cessation counseling performed. The patient agrees to except nicotine patch for tobacco. At this point there is no indication for antibiotics. Clinically pneumonia is unlikely.    PLAN:  Discharge Medication List as of 10/5/2017  2:30 PM      START taking these medications    Details   albuterol 108 (90 Base) MCG/ACT Aero Soln inhalation aerosol Inhale 2 Puffs by mouth every 6 hours as needed for Shortness of Breath. You may use it up to every 4 hours but should see a physician if you need it more often than every 4 hours., Disp-1 Inhaler, R-0, Print Rx Paper      nicotine (NICODERM) 14 MG/24HR PATCH 24 HR Apply 1 Patch to skin as directed every 24 hours., Disp-14 Patch, R-0, Print Rx Paper      nicotine (NICODERM) 21 MG/24HR PATCH 24 HR Apply 1 Patch to skin as directed every 24 hours., Disp-14 Patch, R-0, Print Rx Paper      nicotine (NICODERM) 7 MG/24HR PATCH 24 HR Apply 1 Patch to skin as directed every 24 hours., Disp-14 Patch, R-0, Print Rx Paper           This patient has borderline or elevated blood pressure as recorded above and was instructed to followup with primary physician for comprehensive blood pressure evaluation and yearly fasting cholesterol assessment according to to CMS protocol.      Viral syndrome handout given    [facility]  [facility]  [place] [address]  [phone]    Schedule an appointment as soon as possible for a visit in 5 days  As needed      CONDITION:  Good.    FINAL IMPRESSION:  1. Viral syndrome    2. Tobacco abuse    3.      Tobacco cessation counseling brief      Electronically signed by: Ivan Pedraza, 10/5/2017

## 2017-10-05 NOTE — FLOWSHEET NOTE
10/05/17 1259   Interdisciplinary Plan of Care-Goals (Indications)   Obstructive Ventilatory Defect or Pulmonary Disease without Obvious Obstruction Physical Exam / Hyperinflation / Wheezing (bronchospasm)   Interdisciplinary Plan of Care-Outcomes    Bronchodilator Outcome Improved Vital Signs and Measures of Gas Exchange   Education   Education Yes - Pt. / Family has been Instructed in use of Respiratory Equipment   RT Assessment of Delivered Medications   Evaluation of Medication Delivery Daily Yes-- Pt /Family has been Instructed in use of Respiratory Medications and Adverse Reactions   SVN Group   #SVN Performed Yes   Given By: Mouthpiece   Date SVN Last Changed 10/05/17   Date SVN Next Change Due (Q 7 Days) 10/12/17   Respiratory WDL   Respiratory (WDL) X   Chest Exam   Respiration 18   Pulse (!) 111   Breath Sounds   RUL Breath Sounds Diminished   RML Breath Sounds Diminished   RLL Breath Sounds Diminished   JACK Breath Sounds Diminished   LLL Breath Sounds Diminished   Secretions   Cough Congested   Oximetry   #Pulse Oximetry (Single Determination) Yes   Oxygen   Pulse Oximetry 93 %   O2 (LPM) 0   O2 (FiO2) 21   O2 Daily Delivery Respiratory  Room Air with O2 Available

## 2017-10-05 NOTE — DISCHARGE INSTRUCTIONS
" Viral Illness    Take ibuprofen 600-800 mg every 6 hours or naproxen 500 mg every 12 hours for pain and fever. Use albuterol for cough or shortness of breath. Stop smoking tobacco and uses nicotine patch. Return for ill appearance or shortness of breath.  See a doctor if not better or worsening after 5-6 days of fever or other significant symptoms.     You had a borderline or high normal blood pressure reading today.  This does not necessarily mean you have hypertension.  Please followup with your/a primary physician for comprehensive blood pressure evaluation and yearly fasting cholesterol assessment.  BP Readings from Last 3 Encounters:   10/05/17 134/87   08/09/17 133/88   05/02/17 134/88         Your exam indicates you have a viral illness.  Typical symptoms of virus infections include: fever, muscle aches, headache, fatigue, stomach upsets, sore throat, and dry cough. Antibiotic drugs are not effective in viral illnesses; they are only given when there is a secondary bacterial infection.    General treatment includes bed rest, increasing oral fluid intake of clear, non-caffeinated drinks like ginger ale, fruit juices, water, or sports (electrolyte) drinks, and medicine to relieve specific symptoms such as cough, pain, or diarrhea.  Acetaminophen (Tylenol) or ibuprofen may be used to help control fever and pain.      Please call your doctor if you are not better after 2-3 days of symptom treatment.  Call or return here right away if your illness gets more severe, or you develop any other new symptoms, such as a fever above 103 F, vomiting for more than a day, severe headache or other pain, stiff neck, trouble breathing, visual problems, \"blackouts\" or fainting.    Document Released: 12/18/2006    amBX® Patient Information ©2008 [employer].    "

## 2017-10-05 NOTE — ED NOTES
Patient dc'd to home w/ significant other. Patient alert and oriented x 4. Patient ambulatory w/ steady gait. No s/s of dysnpea upon discharge. Patient verbalizes understanding of discharge instructions including diagnosis, smoking cessation, and follow up care. Patient verbalizes understanding of prescriptions x 4 (albuterol neb and taper nicotine patches). Patient denies questions upon discharge.

## 2017-10-05 NOTE — ED NOTES
Earnestine Knight 22 y.o. female     Chief Complaint   Patient presents with   • Cough     X2 Days.  Productive with green sputum.   • Headache     x2 days      Pt returned to lobby and educated on triage process.  Advised to notify RN with changes or concerns.

## 2017-12-19 ENCOUNTER — OFFICE VISIT (OUTPATIENT)
Dept: MEDICAL GROUP | Facility: MEDICAL CENTER | Age: 22
End: 2017-12-19
Attending: NURSE PRACTITIONER
Payer: MEDICAID

## 2017-12-19 VITALS
BODY MASS INDEX: 51.91 KG/M2 | HEART RATE: 125 BPM | SYSTOLIC BLOOD PRESSURE: 118 MMHG | WEIGHT: 293 LBS | OXYGEN SATURATION: 97 % | DIASTOLIC BLOOD PRESSURE: 70 MMHG | TEMPERATURE: 97.3 F | RESPIRATION RATE: 18 BRPM | HEIGHT: 63 IN

## 2017-12-19 DIAGNOSIS — M25.572 CHRONIC PAIN OF BOTH ANKLES: ICD-10-CM

## 2017-12-19 DIAGNOSIS — F99 PSYCHIATRIC DISORDER: ICD-10-CM

## 2017-12-19 DIAGNOSIS — Z13.21 ENCOUNTER FOR VITAMIN DEFICIENCY SCREENING: ICD-10-CM

## 2017-12-19 DIAGNOSIS — Z13.220 SCREENING CHOLESTEROL LEVEL: ICD-10-CM

## 2017-12-19 DIAGNOSIS — Z13.29 SCREENING FOR THYROID DISORDER: ICD-10-CM

## 2017-12-19 DIAGNOSIS — I10 ESSENTIAL HYPERTENSION: ICD-10-CM

## 2017-12-19 DIAGNOSIS — E66.01 MORBID OBESITY WITH BMI OF 50.0-59.9, ADULT (HCC): ICD-10-CM

## 2017-12-19 DIAGNOSIS — M25.571 CHRONIC PAIN OF BOTH ANKLES: ICD-10-CM

## 2017-12-19 DIAGNOSIS — Z13.1 SCREENING FOR DIABETES MELLITUS: ICD-10-CM

## 2017-12-19 DIAGNOSIS — Z13.0 SCREENING, IRON DEFICIENCY ANEMIA: ICD-10-CM

## 2017-12-19 DIAGNOSIS — Z01.419 ENCOUNTER FOR WELL WOMAN EXAM: ICD-10-CM

## 2017-12-19 DIAGNOSIS — G89.29 CHRONIC PAIN OF BOTH ANKLES: ICD-10-CM

## 2017-12-19 DIAGNOSIS — Z30.011 ENCOUNTER FOR INITIAL PRESCRIPTION OF CONTRACEPTIVE PILLS: ICD-10-CM

## 2017-12-19 PROBLEM — Z30.9 ENCOUNTER FOR BIRTH CONTROL: Status: ACTIVE | Noted: 2017-12-19

## 2017-12-19 LAB
INT CON NEG: NEGATIVE
INT CON POS: POSITIVE
POC URINE PREGNANCY TEST: NEGATIVE

## 2017-12-19 PROCEDURE — 99203 OFFICE O/P NEW LOW 30 MIN: CPT | Performed by: NURSE PRACTITIONER

## 2017-12-19 PROCEDURE — 99204 OFFICE O/P NEW MOD 45 MIN: CPT | Performed by: NURSE PRACTITIONER

## 2017-12-19 RX ORDER — NORGESTIMATE AND ETHINYL ESTRADIOL 0.25-0.035
1 KIT ORAL DAILY
Qty: 28 TAB | Refills: 2 | Status: SHIPPED | OUTPATIENT
Start: 2017-12-19 | End: 2018-01-22

## 2017-12-20 NOTE — ASSESSMENT & PLAN NOTE
Pt reports hx of HTN.  118/70 today. Is taking Inderal for anxiety and BP  Denies concerns today.

## 2017-12-20 NOTE — ASSESSMENT & PLAN NOTE
Pt reprots hx of fx of both ankles in past few years.  Reports it hurts to stand on feet a lot w/o pain to ankles.  Getting off feet helps.

## 2017-12-20 NOTE — ASSESSMENT & PLAN NOTE
Pt has hx of anxiety, depression, Bipolar Disorder, PTSD.  Currently on meds by Dr Schmitt at [facility]

## 2017-12-20 NOTE — PROGRESS NOTES
Earnestine presents to the clinic to establish as New Patient.    Her PMH includes    Anxiety  Depression  Bipolar Disorder  PTSD  Hx of SIDS death of infant at 6 wks old ( 2016)  Migraine Headaches  Morbid Obesity  Hyeprtension  Tobacco use- smoking  Depo Provera injections in past  Bilateral Ankle Pain      Established with  Psychiatry- [facility]-DR Schmitt Psychiatrist      [place]  REport shows  1/16/17 Percocet 5/325 # 30 by Tara Claudio  ---------------------------------------------------------    Review of Records shows  10/15/17 ER visit for Cough, RX for Albuterol, and Nicotine patches.  8/9/17 ER visit for Rolled ankle and headache xray negative.    Chief Complaint: Establish care, concern over birth control     HPI:      Psychiatric disorder  Pt has hx of anxiety, depression, Bipolar Disorder, PTSD.  Currently on meds by Dr Schmitt at [facility]   Denies suicidal ideation.      Essential hypertension  Pt reports hx of HTN.  118/70 today. Is taking Inderal for anxiety and BP  Denies concerns today.    Chronic pain of both ankles  Pt reprots hx of fx of both ankles in past few years.  Reports it hurts to stand on feet a lot w/o pain to ankles.  Getting off feet helps.    Encounter for birth control  Pt reports last Depo injection in August and does not want to have injections any longer  LMP she is unsure. Recommended we get urine POC today.  Pt last Pap at least 3-4 yrs ago    Encounter for well woman exam  Pt reports no Pap smear in a number of years.  Denies any abnormal paps.  Has been on depo for over a year and gained over 50 lbs she believes is related to that.  Last depo in August.  Discussed need for pap and maybe alternative birth control options;  Will refer to GYN for PAP as patient is very large and PAP exam will be challenging to   Obtain an adequate sample.    Morbid obesity with BMI of 50.0-59.9, adult (HCC)  Pt is extremely over weight and states the Depo Shots this past year had her gain over 50  lbs.  Last Depo in August. Discussed her quitting Sodas as she reports she drinks a lot of   Sugar Sodas. Discussed need to read labels, cut way down on carbohydrates.  Discussed checking TSH as well.      Patient Active Problem List    Diagnosis Date Noted   • Psychiatric disorder 12/19/2017   • Encounter for birth control 12/19/2017   • Essential hypertension 12/19/2017   • Morbid obesity with BMI of 50.0-59.9, adult ([facility]) 12/19/2017   • Chronic pain of both ankles 12/19/2017   • Encounter for well woman exam 12/19/2017   • SIDS (sudden infant death syndrome) with last baby at 6 weeks of age 07/19/2016   • Tobacco dependence says cut down to 5 cigarettes/day 07/19/2016       Allergies:Patient has no allergy information on record.    Current Outpatient Prescriptions   Medication Sig Dispense Refill   • norgestimate-ethinyl estradiol (ORTHO-CYCLEN) 0.25-35 MG-MCG per tablet Take 1 Tab by mouth every day. 28 Tab 2   • albuterol 108 (90 Base) MCG/ACT Aero Soln inhalation aerosol Inhale 2 Puffs by mouth every 6 hours as needed for Shortness of Breath. You may use it up to every 4 hours but should see a physician if you need it more often than every 4 hours. 1 Inhaler 0   • nicotine (NICODERM) 14 MG/24HR PATCH 24 HR Apply 1 Patch to skin as directed every 24 hours. 14 Patch 0   • nicotine (NICODERM) 21 MG/24HR PATCH 24 HR Apply 1 Patch to skin as directed every 24 hours. 14 Patch 0   • nicotine (NICODERM) 7 MG/24HR PATCH 24 HR Apply 1 Patch to skin as directed every 24 hours. 14 Patch 0   • aripiprazole (ABILIFY) 20 MG tablet Take 20 mg by mouth every day.     • propranolol (INDERAL) 10 MG Tab Take 1 mg by mouth 2 times a day.     • risperidone (RISPERDAL) 1 MG Tab Take 1 mg by mouth 2 times a day.       No current facility-administered medications for this visit.        Social History   Substance Use Topics   • Smoking status: Current Every Day Smoker     Packs/day: 1.00     Years: 9.00     Types: Cigarettes   •  "Smokeless tobacco: Never Used      Comment: Pt states smokin 1/2 pack a day.   • Alcohol use No       History reviewed. No pertinent family history.    ROS:  Review of Systems   See HPI Above        Exam:  Blood pressure 118/70, pulse (!) 125, temperature 36.3 °C (97.3 °F), resp. rate 18, height 1.6 m (5' 3\"), weight (!) 133.5 kg (294 lb 6.4 oz), SpO2 97 %, not currently breastfeeding.  General:  Well nourished, OBESE well developed female in mild discomfort.  HENT:Head is grossly normal. PERRL. Tonsils 1 + no exudates.   Neck: Supple. Trachea is midline.  Pulmonary: Clear to ausculation .  Normal effort. No rales, ronchi, or wheezing.   Cardiovascular: Regular rate and rhythm.  Abdomen-Abdomen is soft, Protuberant, No tenderness.  Upper extremities- Strong = . Good ROM  Lower extremities- neg for edema, redness, tenderness.  Neuro- A & O x 4. Speech clear and appropriate.     Current medications, allergies, and problem list reviewed with patient and updated in  EPIC today.    Assessment/Plan:  1. Screening for thyroid disorder  TSH   2. Encounter for vitamin deficiency screening  VITAMIN D,25 HYDROXY    VITAMIN B12   3. Screening, iron deficiency anemia  CBC WITH DIFFERENTIAL    IRON/TOTAL IRON BIND    FERRITIN   4. Screening for diabetes mellitus  COMP METABOLIC PANEL   5. Screening cholesterol level  LIPID PROFILE   6. Psychiatric disorder  Continue meds per Dr Schmitt at [facility]  And counseling as well.       7. Encounter for initial prescription of contraceptive pills   Encounter for well woman exam REFERRAL TO GYNECOLOGY    norgestimate-ethinyl estradiol (START) (ORTHO-CYCLEN) 0.25-35 MG-MCG per tablet    POCT Pregnancy= Negative today   8. Essential hypertension  Continue Propanolol.    9. Morbid obesity with BMI of 50.0-59.9, adult (CMS-[facility])  Patient identified as having weight management issue.  Appropriate orders and counseling given.   10. Chronic pain of both ankles  REFERRAL TO ORTHOPEDICS   .   "   Follow up in 4 weeks for lab review. Call or return if questions, concerns, or worsening condition.

## 2017-12-20 NOTE — ASSESSMENT & PLAN NOTE
Pt is extremely over weight and states the Depo Shots this past year had her gain over 50 lbs.  Last Depo in August. Discussed her quitting Sodas as she reports she drinks a lot of   Sugar Sodas

## 2017-12-20 NOTE — ASSESSMENT & PLAN NOTE
Pt reports no Pap smear in a number of years.  Denies any abnormal paps.  Has been on depo for over a year and gained over 50 lbs she believes is related to that.  Last depo in August.  Discussed need for pap and maybe alternative birth control options;  Will refer to GYN for PAP as patient is very large and PAP exam will be challenging to   Obtain an adequate sample.

## 2017-12-20 NOTE — ASSESSMENT & PLAN NOTE
Pt reports last Depo injection in August and does not want to have injections any longer  LMP she is unsure. Recommended we get urine POC today.  Pt last Pap

## 2018-01-22 ENCOUNTER — OFFICE VISIT (OUTPATIENT)
Dept: MEDICAL GROUP | Facility: MEDICAL CENTER | Age: 23
End: 2018-01-22
Attending: NURSE PRACTITIONER
Payer: MEDICAID

## 2018-01-22 VITALS
HEART RATE: 100 BPM | DIASTOLIC BLOOD PRESSURE: 74 MMHG | RESPIRATION RATE: 20 BRPM | OXYGEN SATURATION: 98 % | BODY MASS INDEX: 51.91 KG/M2 | HEIGHT: 63 IN | SYSTOLIC BLOOD PRESSURE: 104 MMHG | WEIGHT: 293 LBS | TEMPERATURE: 96.7 F

## 2018-01-22 DIAGNOSIS — M25.572 CHRONIC PAIN OF BOTH ANKLES: ICD-10-CM

## 2018-01-22 DIAGNOSIS — R73.01 ELEVATED FASTING BLOOD SUGAR: ICD-10-CM

## 2018-01-22 DIAGNOSIS — M25.571 CHRONIC PAIN OF BOTH ANKLES: ICD-10-CM

## 2018-01-22 DIAGNOSIS — E55.9 VITAMIN D DEFICIENCY: ICD-10-CM

## 2018-01-22 DIAGNOSIS — E66.01 MORBID OBESITY WITH BMI OF 50.0-59.9, ADULT (HCC): ICD-10-CM

## 2018-01-22 DIAGNOSIS — G89.29 CHRONIC PAIN OF BOTH ANKLES: ICD-10-CM

## 2018-01-22 DIAGNOSIS — F99 PSYCHIATRIC DISORDER: ICD-10-CM

## 2018-01-22 PROCEDURE — 99213 OFFICE O/P EST LOW 20 MIN: CPT | Performed by: NURSE PRACTITIONER

## 2018-01-22 PROCEDURE — 99212 OFFICE O/P EST SF 10 MIN: CPT | Performed by: NURSE PRACTITIONER

## 2018-01-22 NOTE — ASSESSMENT & PLAN NOTE
Pt does not want to go to [place] to see Ortho.  States ankles feel a little better and believes due to wearing boots with more support. We discussed need for MRI to get ortho approval but not interested.

## 2018-01-22 NOTE — ASSESSMENT & PLAN NOTE
Pt has hx of Anxiety, Depression, PTSD, Bipolar Disorder and is continuing   Psychiatric care w DR Schmitt at [facility].  She reports

## 2018-01-22 NOTE — PROGRESS NOTES
Earnestine presents to the clinic for results.    Her past medical history includes:  Anxiety  Depression  Bipolar Disorder  PTSD  Hx of SIDS death of infant at 6 wks old ( 2016)  Migraine Headaches  Morbid Obesity  Hyeprtension  Tobacco use- smoking  Depo Provera injections in past  Bilateral Ankle Pain  Elevated Fasting BS (new)  Vitamin D Deficiency (new)     Referrals Approved:  GYN- DR Tomlin  Ortho- None unless MRI ordered/completed, then probably [place] area per Referral Dept.     Established with  Psychiatry- [facility]-DR Schmitt Psychiatrist        [place]  REport shows  1/16/17 Percocet 5/325 # 30 by Tara Claudio  ---------------------------------------------------------     Review of Records shows:  12/19/17 New Patient clinic appointment. Chronic ankle pain, birth control, obesity/weight gain. Labs ordered, start Ortho-Cyclen, refer to GYN, refer to orthopedics.  10/15/17 ER visit for Cough, RX for Albuterol, and Nicotine patches.  8/9/17 ER visit for Rolled ankle and headache xray negative.    Results Review:  [facility]- CBC normal iron levels normal B12 equals 227, lipids good except HDL equals 39, CMP normal except blood sugar equaled 111.  TSH equals 1.10, vitamin D equals 29.    Chief Complaint: Results, f/u on ankle pain    HPI:      Elevated fasting blood sugar  We discussed her labs including elevated fasting BS.  Discussed relationship of obesity to Pre-Dm and DM  And recommendation to reduce mid-abd wt, eat less sugar/carbs  And increase exercise.    Vitamin D deficiency  We discussed her low Vit D.  Discussed optimal levels of vitamin D is important.  I recommended daily supplement.    Psychiatric disorder  Pt has hx of Anxiety, Depression, PTSD, Bipolar Disorder and is continuing   Psychiatric care w DR Schmitt at [facility].  She reports is doing well. Denies concerns nor suicidal ideation.    Chronic pain of both ankles  Pt does not want to go to [place] to see Ortho.  States ankles feel a  "little better and believes due to wearing boots with more support. We discussed need for MRI to get ortho approval but not interested.    Morbid obesity with BMI of 50.0-59.9, adult ([facility])  Pt is extremely over weight BMo over 52.  Also has PRE-DM  Wants options for wt loss        Patient Active Problem List    Diagnosis Date Noted   • Elevated fasting blood sugar 01/22/2018   • Vitamin D deficiency 01/22/2018   • Psychiatric disorder 12/19/2017   • Encounter for birth control 12/19/2017   • Essential hypertension 12/19/2017   • Morbid obesity with BMI of 50.0-59.9, adult ([facility]) 12/19/2017   • Chronic pain of both ankles 12/19/2017   • Encounter for well woman exam 12/19/2017   • SIDS (sudden infant death syndrome) with last baby at 6 weeks of age 07/19/2016   • Tobacco dependence says cut down to 5 cigarettes/day 07/19/2016       Allergies:Patient has no known allergies.    Current Outpatient Prescriptions   Medication Sig Dispense Refill   • Cholecalciferol 2000 UNIT Cap Take 1 Cap by mouth every day. 30 Cap 11   • propranolol (INDERAL) 10 MG Tab Take 1 mg by mouth 2 times a day.     • risperidone (RISPERDAL) 1 MG Tab Take 1 mg by mouth 2 times a day.       No current facility-administered medications for this visit.        Social History   Substance Use Topics   • Smoking status: Current Every Day Smoker     Packs/day: 1.00     Years: 9.00     Types: Cigarettes   • Smokeless tobacco: Never Used      Comment: Pt states smokin 1/2 pack a day.   • Alcohol use No       History reviewed. No pertinent family history.    ROS:  Review of Systems   See HPI Above      Exam:  Blood pressure 104/74, pulse 100, temperature 35.9 °C (96.7 °F), resp. rate 20, height 1.6 m (5' 2.99\"), weight (!) 134.7 kg (297 lb), last menstrual period 01/19/2018, SpO2 98 %, not currently breastfeeding.     General:  Well nourished, over-weight, well developed female in NAD  HENT:Head is grossly normal. PERRL.  Neck: Supple. Trachea is " midline.  Pulmonary: Clear to ausculation .  Normal effort. No rales, ronchi, or wheezing.   Cardiovascular: Regular rate and rhythm.  Abdomen-Abdomen is soft, No tenderness.  Upper extremities- Strong = . Good ROM  Lower extremities- neg for edema, redness, tenderness.  Neuro- A & O x 4. Speech clear and appropriate.     Current medications, allergies, and problem list reviewed with patient and updated in  EPIC today.    Assessment/Plan:  1. Elevated fasting blood sugar  REFERRAL TO NUTRITION SERVICES    REFERRAL TO BARIATRIC SURGERY   2. Vitamin D deficiency  Cholecalciferol 2000 UNIT Cap   3. Psychiatric disorder  F/u [facility]   4. Chronic pain of both ankles  Is improving. Pt to monitor.   5. Morbid obesity with BMI of 50.0-59.9, adult ([facility])  REFERRAL TO NUTRITION SERVICES    REFERRAL TO BARIATRIC SURGERY   Follow up in 3 months. Call or return if questions, concerns, or worsening condition.

## 2018-01-22 NOTE — ASSESSMENT & PLAN NOTE
We discussed her low Vit D.  Discussed optimal levels of vitamin D is important.  I recommended daily supplement.

## 2018-01-25 DIAGNOSIS — E66.01 MORBID OBESITY WITH BMI OF 50.0-59.9, ADULT (HCC): ICD-10-CM

## 2018-08-14 ENCOUNTER — OFFICE VISIT (OUTPATIENT)
Dept: MEDICAL GROUP | Facility: MEDICAL CENTER | Age: 23
End: 2018-08-14
Attending: INTERNAL MEDICINE
Payer: MEDICAID

## 2018-08-14 VITALS
WEIGHT: 263 LBS | DIASTOLIC BLOOD PRESSURE: 100 MMHG | OXYGEN SATURATION: 91 % | HEART RATE: 104 BPM | SYSTOLIC BLOOD PRESSURE: 130 MMHG | HEIGHT: 63 IN | BODY MASS INDEX: 46.6 KG/M2 | RESPIRATION RATE: 20 BRPM | TEMPERATURE: 97.4 F

## 2018-08-14 DIAGNOSIS — J02.9 SORE THROAT: ICD-10-CM

## 2018-08-14 DIAGNOSIS — F31.60 BIPOLAR AFFECTIVE DISORDER, CURRENT EPISODE MIXED, CURRENT EPISODE SEVERITY UNSPECIFIED (HCC): ICD-10-CM

## 2018-08-14 PROBLEM — E66.01 MORBID OBESITY WITH BMI OF 45.0-49.9, ADULT (HCC): Status: ACTIVE | Noted: 2018-08-14

## 2018-08-14 PROBLEM — Z01.419 ENCOUNTER FOR WELL WOMAN EXAM: Status: RESOLVED | Noted: 2017-12-19 | Resolved: 2018-08-14

## 2018-08-14 PROBLEM — E66.01 MORBID OBESITY WITH BMI OF 50.0-59.9, ADULT (HCC): Status: RESOLVED | Noted: 2017-12-19 | Resolved: 2018-08-14

## 2018-08-14 PROBLEM — F31.9 BIPOLAR DISORDER (HCC): Status: ACTIVE | Noted: 2018-08-14

## 2018-08-14 LAB
INT CON NEG: NEGATIVE
INT CON POS: POSITIVE
S PYO AG THROAT QL: NEGATIVE

## 2018-08-14 PROCEDURE — 99213 OFFICE O/P EST LOW 20 MIN: CPT | Performed by: INTERNAL MEDICINE

## 2018-08-14 PROCEDURE — 87880 STREP A ASSAY W/OPTIC: CPT | Performed by: INTERNAL MEDICINE

## 2018-08-14 PROCEDURE — 99214 OFFICE O/P EST MOD 30 MIN: CPT | Performed by: INTERNAL MEDICINE

## 2018-08-14 RX ORDER — RISPERIDONE 1 MG/1
1 TABLET ORAL
Status: SHIPPED | DISCHARGE
Start: 2018-08-14 | End: 2018-10-30 | Stop reason: SDUPTHER

## 2018-08-14 ASSESSMENT — PAIN SCALES - GENERAL: PAINLEVEL: NO PAIN

## 2018-08-14 NOTE — ASSESSMENT & PLAN NOTE
Patient states that for the past week, she has had severe sore throat off and on.  She describes difficulty swallowing and swelling in the back of her throat.  She has not checked her temperature.  She describes pain in the neck on both sides.  Denies cough, nasal congestion, ear pain.  Reports an intermittent headache.  Denies sick contacts.

## 2018-08-14 NOTE — ASSESSMENT & PLAN NOTE
"Patient reports a history of bipolar disorder.  She was previously followed at [facility].  She states that she was taking 2 different medications, 1 of which was risperidone.  She is not sure what the other medication was.  She would like to restart both.  States that she was \"dismissed from her program\" and does not have a psychiatrist, but she would like to get back in with somebody.  "

## 2018-08-14 NOTE — PROGRESS NOTES
"Subjective:   Earnestine Knight is a 23 y.o. female here today for sore throat, would like to restart psychiatric medications.  Her current PCP, Houston Hernandez is unavailable to see her today    Sore throat  Patient states that for the past week, she has had severe sore throat off and on.  She describes difficulty swallowing and swelling in the back of her throat.  She has not checked her temperature.  She describes pain in the neck on both sides.  Denies cough, nasal congestion, ear pain.  Reports an intermittent headache.  Denies sick contacts.     Bipolar disorder (HCC)  Patient reports a history of bipolar disorder.  She was previously followed at [facility].  She states that she was taking 2 different medications, 1 of which was risperidone.  She is not sure what the other medication was.  She would like to restart both.  States that she was \"dismissed from her program\" and does not have a psychiatrist, but she would like to get back in with somebody.       Current medicines (including changes today)  Current Outpatient Prescriptions   Medication Sig Dispense Refill   • risperiDONE (RISPERDAL) 1 MG Tab Take 1 Tab by mouth every bedtime.     • Cholecalciferol 2000 UNIT Cap Take 1 Cap by mouth every day. 30 Cap 11   • propranolol (INDERAL) 10 MG Tab Take 1 mg by mouth 2 times a day.       No current facility-administered medications for this visit.      She  has a past medical history of Anxiety; Anxiety disorder; Bipolar disorder (HCC); Depression; History of  x 3 needs another csections (2016); Hypertension (2016); Migraine; PTSD (post-traumatic stress disorder); SIDS (sudden infant death syndrome) with last baby at 6 weeks of age (2016); and Tobacco dependence says cut down to 5 cigarettes/day (2016).    ROS   Reports generalized abdominal pain  Denies shortness of breath, chest pain  As above in HPI     Objective:     Blood pressure 130/100, pulse (!) 104, temperature 36.3 °C (97.4 °F), " "resp. rate 20, height 1.6 m (5' 2.99\"), weight 119.3 kg (263 lb), SpO2 91 %, not currently breastfeeding. Body mass index is 46.6 kg/m².   Physical Exam:  Constitutional: Alert, no distress.  Skin: Warm, dry, good turgor, no rashes in visible areas.  Eye: Equal, round and reactive, conjunctiva clear, lids normal.  ENMT: Lips without lesions, poor dentition, oropharynx mildly injected, tonsils enlarged bilaterally without exudates  Neck: Trachea midline, no masses, no thyromegaly. No cervical or supraclavicular lymphadenopathy, tenderness to palpation over bilateral neck      Results and Imaging:   POC rapid strep in clinic today was negative    Assessment and Plan:   The following treatment plan was discussed    1. Sore throat  Negative rapid strep.  Most likely represents viral URI/tonsillitis.  Patient was advised on conservative therapy including ibuprofen as needed for pain, gargling with salt water rinses, Chloraseptic spray.  - POCT Rapid Strep A    2. Bipolar affective disorder, current episode mixed, current episode severity unspecified (HCC)  Uncontrolled now that patient is off of her medications.  Unfortunately, we do not have records from her previous psychiatrist.  We do not have an accurate medication list of what she was taking.  I have contacted her pharmacy and she is currently taking Risperdal and Cogentin.  She has refills active on both and I will let her know that she can go pick these up.  - REFERRAL TO PSYCHIATRY    Patient has numerous other complaints that we were not able to address today.  A follow-up visit was made with her PCP for the next 1-2 weeks.    Followup: Return in about 2 weeks (around 8/28/2018), or if symptoms worsen or fail to improve.         "

## 2018-09-05 ENCOUNTER — OFFICE VISIT (OUTPATIENT)
Dept: MEDICAL GROUP | Facility: MEDICAL CENTER | Age: 23
End: 2018-09-05
Attending: NURSE PRACTITIONER
Payer: MEDICAID

## 2018-09-05 VITALS
HEIGHT: 63 IN | OXYGEN SATURATION: 100 % | WEIGHT: 274 LBS | BODY MASS INDEX: 48.55 KG/M2 | HEART RATE: 100 BPM | RESPIRATION RATE: 20 BRPM | DIASTOLIC BLOOD PRESSURE: 82 MMHG | SYSTOLIC BLOOD PRESSURE: 120 MMHG | TEMPERATURE: 98.2 F

## 2018-09-05 DIAGNOSIS — F99 PSYCHIATRIC DISORDER: ICD-10-CM

## 2018-09-05 DIAGNOSIS — B96.89 BACTERIAL VAGINOSIS: ICD-10-CM

## 2018-09-05 DIAGNOSIS — N76.0 BACTERIAL VAGINOSIS: ICD-10-CM

## 2018-09-05 DIAGNOSIS — I10 ESSENTIAL HYPERTENSION: ICD-10-CM

## 2018-09-05 DIAGNOSIS — N89.8 VAGINAL DISCHARGE: ICD-10-CM

## 2018-09-05 PROCEDURE — 99214 OFFICE O/P EST MOD 30 MIN: CPT | Performed by: NURSE PRACTITIONER

## 2018-09-05 PROCEDURE — 99213 OFFICE O/P EST LOW 20 MIN: CPT | Performed by: NURSE PRACTITIONER

## 2018-09-05 RX ORDER — CLONIDINE HYDROCHLORIDE 0.1 MG/1
0.1 TABLET ORAL 2 TIMES DAILY
Qty: 60 TAB | Refills: 1 | Status: SHIPPED | OUTPATIENT
Start: 2018-09-05 | End: 2018-10-27 | Stop reason: SDUPTHER

## 2018-09-05 RX ORDER — METRONIDAZOLE 500 MG/1
500 TABLET ORAL 3 TIMES DAILY
Qty: 21 TAB | Refills: 0 | Status: SHIPPED | OUTPATIENT
Start: 2018-09-05 | End: 2018-10-30

## 2018-09-05 ASSESSMENT — PATIENT HEALTH QUESTIONNAIRE - PHQ9
CLINICAL INTERPRETATION OF PHQ2 SCORE: 6
5. POOR APPETITE OR OVEREATING: 3 - NEARLY EVERY DAY
SUM OF ALL RESPONSES TO PHQ QUESTIONS 1-9: 23

## 2018-09-05 NOTE — ASSESSMENT & PLAN NOTE
Seen by my colleague, Dr Ayala on 8/14/18 w request to start back on Psych Meds for Bipolar Depression, hx of PTSD/anxiety.  Risperdal and Cogentin. Reported no longer able to be seen at [facility] where she had been obtaining her psychiatric meds and care.  New Referral Placed to Psychiatry( [facility]).    Today she reports issues w anxiety and depression.  Did not start Risperdal until today as did not have ride to go to pharmacy  Reports easily angered. Frustrated easily.  Denies suicidal ideation  Discussed addition of Clonidine for anxiety  And she needs to call [facility] for local Psychiatrist  And given info to call today or in morning.

## 2018-09-05 NOTE — PROGRESS NOTES
Chief Complaint:   Chief Complaint   Patient presents with   • Vaginal Discharge     possible std    • Anxiety   • Depression       HPI:  Earnestine is here today for questions about Vaginal Discharge and Anxiety/Depression    Her past medical history includes:  Anxiety  Depression  Bipolar Disorder  PTSD  Hx of SIDS death of infant at 6 wks old ( 2016)  Migraine Headaches  Morbid Obesity  Hyeprtension  Tobacco use- smoking  Depo Provera injections in past  Bilateral Ankle Pain, chronic  Elevated Fasting BS/ PRE-DM  Vitamin D Deficiency      Previous Referrals Approved:  GYN- DR Tomlin  Ortho- None unless MRI ordered/completed, then probably [place] area per Referral Dept.     Previously Established with  Psychiatry- [facility]-DR Schmitt Psychiatrist        [place] shows  1/16/17 Percocet 5/325 # 30 by Tara Claudio  ---------------------------------------------------------     Review of Records:  8/14/18 Clinic visit w my colleague, Dr. Ayala. Pt w Bipolar Disorder requesting to start back on Psych Meds.   Has refills of Risperdal and Cogentin. To start. Referral placed to Psychiatry.    1/22/18 Clinic appt for Lab Results, f/u on chronic ankle pain. Started on Vitamin D, Discussed Pre- DM/elevated Fasting BS and need to lose wt.    12/19/17 New Patient clinic appointment. Chronic ankle pain, birth control, obesity/weight gain. Labs ordered, start Ortho-Cyclen, refer to GYN, refer to orthopedics. To continue w [facility] Psychiatry.    10/15/17 ER visit for Cough, RX for Albuterol, and Nicotine patches.  8/9/17 ER visit for Rolled ankle and headache xray negative.    Psychiatric disorder  Seen by my colleague, Dr Ayala on 8/14/18 w request to start back on Psych Meds for Bipolar Depression, hx of PTSD/anxiety.  Risperdal and Cogentin. Reported no longer able to be seen at [facility] where she had been obtaining her psychiatric meds and care.  New Referral Placed to Psychiatry( [facility]).    Today  "she reports issues w anxiety and depression.  Did not start Risperdal until today as did not have ride to go to pharmacy  Reports easily angered. Frustrated easily.  Denies suicidal ideation  Discussed addition of Clonidine for anxiety  And she needs to call [facility] for local Psychiatrist  And given info to call today or in morning.    Essential hypertension  Hx of HTN. BP elevated at 8/14/18 appt w my colleague, Dr Ayala.  130/100.   Today BP = 120/82, HR = 100    Vaginal discharge  Pt reports possible exposure to STD and has Vaginal Discharge.  Has had sex we another man besides her boyfriend  Reports yellow white discharge, \"fishy smell\".  Irritated Vaginal pain.   Denies n/v or fever.   Denies vaginal bleeding.    Wants to start tx today.  Discussed possibility of Trichomonas, Gardnerella(Bact Vaginosis), GC, Chlamydia, Yeast  And need for pelvic exam and swab samples.          Patient Active Problem List    Diagnosis Date Noted   • Vaginal discharge 09/05/2018   • Sore throat 08/14/2018   • Bipolar disorder ([facility]) 08/14/2018   • Morbid obesity with BMI of 45.0-49.9, adult ([facility]) 08/14/2018   • Elevated fasting blood sugar 01/22/2018   • Vitamin D deficiency 01/22/2018   • Psychiatric disorder 12/19/2017   • Encounter for birth control 12/19/2017   • Essential hypertension 12/19/2017   • Chronic pain of both ankles 12/19/2017   • SIDS (sudden infant death syndrome) with last baby at 6 weeks of age 07/19/2016   • Tobacco dependence says cut down to 5 cigarettes/day 07/19/2016       Allergies:Patient has no known allergies.    Medicines as of today:  Current Outpatient Prescriptions   Medication Sig Dispense Refill   • cloNIDine (CATAPRES) 0.1 MG Tab Take 1 Tab by mouth 2 times a day. 60 Tab 1   • metroNIDAZOLE (FLAGYL) 500 MG Tab Take 1 Tab by mouth 3 times a day. 21 Tab 0   • risperiDONE (RISPERDAL) 1 MG Tab Take 1 Tab by mouth every bedtime.       No current facility-administered " "medications for this visit.        Social History   Substance Use Topics   • Smoking status: Current Every Day Smoker     Packs/day: 1.00     Years: 9.00     Types: Cigarettes   • Smokeless tobacco: Never Used      Comment: Pt states smokin 1/2 pack a day.   • Alcohol use No       Past Medical History:   Diagnosis Date   • Anxiety     currently on medications.    • Anxiety disorder    • Bipolar disorder (HCC)    • Depression    • History of  x 3 needs another csections 2016   • Hypertension 2016    PIH   • Migraine    • PTSD (post-traumatic stress disorder)    • SIDS (sudden infant death syndrome) with last baby at 6 weeks of age 2016   • Tobacco dependence says cut down to 5 cigarettes/day 2016       Family History   Problem Relation Age of Onset   • Family history unknown: Yes       ROS:  Review of Systems   See HPI Above    Exam:  Blood pressure 120/82, pulse 100, temperature 36.8 °C (98.2 °F), resp. rate 20, height 1.6 m (5' 2.99\"), weight 124.3 kg (274 lb), SpO2 100 %, not currently breastfeeding. Body mass index is 48.55 kg/m².    General:  Well nourished, obese, well developed female in mild distress.  HENT:Head is grossly normal. PERRL. Face w numerous scabs/acne.  Neck: Supple. Trachea is midline.  Pulmonary: speaks in full sentences easily. Normal effort.   Cardiovascular: Regular rate and rhythm.  Abdomen-Abdomen is soft, No tenderness.  GYN- Pelvic exam w Chaperone-Katerina Schroeder, off white discharge, very fishy odor. Slight redness to labia  No bleeding noted. Mild tenderness to exam. Swabs sent for testing.  Upper extremities-  Good ROM  Lower extremities- neg for edema, redness, tenderness.  Neuro- A & O x 4. Speech clear and appropriate.    Current medications, allergies, and problem list reviewed with patient and updated in [facility] today.    Assessment/Plan:  1. Psychiatric disorder  cloNIDine (CATAPRES) 0.1 MG Tab  Continue Risperdal. Pt given info on [facility] to call " to get local Psychiatrist.   2. Essential hypertension  Continue current medications.   3. Vaginal discharge  CHLAMYDIA/GC PCR URINE OR SWAB    MISCELLANEOUS TEST-Testing for Trich, BV, Yeast,     metroNIDAZOLE (FLAGYL) 500 MG Tab empiric treatment, Pt informed of no alcohol.   4. Bacterial vaginosis  metroNIDAZOLE (FLAGYL) 500 MG Tab   Patient to call in 2-3 days for results. Pt to not engage in any sexual activity until full resolution of symptoms,  Test results , and treatments.    Return if symptoms worsen or fail to improve.

## 2018-09-05 NOTE — ASSESSMENT & PLAN NOTE
"Pt reports possible exposure to STD and has Vaginal Discharge.  Has had sex we another man besides her boyfriend  Reports yellow white discharge, \"fishy smell\".  Irritated Vaginal pain.     "

## 2018-09-12 ENCOUNTER — HOSPITAL ENCOUNTER (EMERGENCY)
Dept: HOSPITAL 8 - ED | Age: 23
Discharge: HOME | End: 2018-09-12
Payer: MEDICAID

## 2018-09-12 DIAGNOSIS — Z02.9: Primary | ICD-10-CM

## 2018-09-15 ENCOUNTER — HOSPITAL ENCOUNTER (EMERGENCY)
Facility: MEDICAL CENTER | Age: 23
End: 2018-09-15
Payer: MEDICAID

## 2018-09-15 VITALS
HEART RATE: 80 BPM | WEIGHT: 270.95 LBS | SYSTOLIC BLOOD PRESSURE: 150 MMHG | RESPIRATION RATE: 18 BRPM | HEIGHT: 63 IN | BODY MASS INDEX: 48.01 KG/M2 | TEMPERATURE: 98.1 F | DIASTOLIC BLOOD PRESSURE: 96 MMHG | OXYGEN SATURATION: 94 %

## 2018-09-15 PROCEDURE — 302449 STATCHG TRIAGE ONLY (STATISTIC)

## 2018-09-15 ASSESSMENT — PAIN SCALES - GENERAL: PAINLEVEL_OUTOF10: 0

## 2018-09-15 NOTE — ED TRIAGE NOTES
Chief Complaint   Patient presents with   • Tired     x months. states she was diagnosed w/borderline diabetes. wanted to get checked out for her sugar. aaox.4   • Polydipsia     increasing thirst and fequency of urination.      Denies n/v. VSS. Educated on triage process. Instructed to notify staff for any worsening symptoms.

## 2018-10-30 ENCOUNTER — OFFICE VISIT (OUTPATIENT)
Dept: MEDICAL GROUP | Facility: MEDICAL CENTER | Age: 23
End: 2018-10-30
Attending: NURSE PRACTITIONER
Payer: MEDICAID

## 2018-10-30 VITALS
RESPIRATION RATE: 20 BRPM | WEIGHT: 274 LBS | HEART RATE: 74 BPM | TEMPERATURE: 97.5 F | DIASTOLIC BLOOD PRESSURE: 80 MMHG | HEIGHT: 63 IN | OXYGEN SATURATION: 95 % | BODY MASS INDEX: 48.55 KG/M2 | SYSTOLIC BLOOD PRESSURE: 122 MMHG

## 2018-10-30 DIAGNOSIS — E53.8 VITAMIN B12 DEFICIENCY: ICD-10-CM

## 2018-10-30 DIAGNOSIS — R20.0 BILATERAL HAND NUMBNESS: ICD-10-CM

## 2018-10-30 DIAGNOSIS — B96.89 BACTERIAL VAGINOSIS: ICD-10-CM

## 2018-10-30 DIAGNOSIS — F99 PSYCHIATRIC DISORDER: ICD-10-CM

## 2018-10-30 DIAGNOSIS — F31.62 BIPOLAR DISORDER, CURRENT EPISODE MIXED, MODERATE (HCC): ICD-10-CM

## 2018-10-30 DIAGNOSIS — Z23 NEED FOR INFLUENZA VACCINATION: ICD-10-CM

## 2018-10-30 DIAGNOSIS — N89.8 VAGINAL DISCHARGE: ICD-10-CM

## 2018-10-30 DIAGNOSIS — N76.0 BACTERIAL VAGINOSIS: ICD-10-CM

## 2018-10-30 PROCEDURE — 99214 OFFICE O/P EST MOD 30 MIN: CPT | Performed by: NURSE PRACTITIONER

## 2018-10-30 PROCEDURE — 99214 OFFICE O/P EST MOD 30 MIN: CPT | Mod: 25 | Performed by: NURSE PRACTITIONER

## 2018-10-30 PROCEDURE — 90686 IIV4 VACC NO PRSV 0.5 ML IM: CPT

## 2018-10-30 RX ORDER — CLONIDINE HYDROCHLORIDE 0.1 MG/1
0.1 TABLET ORAL 2 TIMES DAILY
Qty: 60 TAB | Refills: 2 | Status: SHIPPED | OUTPATIENT
Start: 2018-10-30 | End: 2019-01-12

## 2018-10-30 RX ORDER — PHENTERMINE HYDROCHLORIDE 30 MG/1
CAPSULE ORAL
COMMUNITY
Start: 2009-07-06 | End: 2018-10-30

## 2018-10-30 RX ORDER — METRONIDAZOLE 500 MG/1
500 TABLET ORAL 3 TIMES DAILY
Qty: 21 TAB | Refills: 0 | Status: SHIPPED | OUTPATIENT
Start: 2018-10-30 | End: 2019-01-30

## 2018-10-30 RX ORDER — CLONIDINE HYDROCHLORIDE 0.1 MG/1
0.1 TABLET ORAL
COMMUNITY
Start: 2010-09-08 | End: 2018-10-30

## 2018-10-30 RX ORDER — BUPROPION HYDROCHLORIDE 150 MG/1
TABLET, EXTENDED RELEASE ORAL
COMMUNITY
Start: 2010-01-28 | End: 2018-10-30

## 2018-10-30 RX ORDER — PROPRANOLOL HYDROCHLORIDE 10 MG/1
10 TABLET ORAL
Refills: 5 | COMMUNITY
Start: 2018-10-17 | End: 2018-10-30 | Stop reason: SDUPTHER

## 2018-10-30 RX ORDER — GABAPENTIN 100 MG/1
100 CAPSULE ORAL 3 TIMES DAILY
Qty: 90 CAP | Refills: 0 | Status: SHIPPED | OUTPATIENT
Start: 2018-10-30 | End: 2019-01-30

## 2018-10-30 RX ORDER — PROPRANOLOL HYDROCHLORIDE 10 MG/1
10 TABLET ORAL 2 TIMES DAILY
Qty: 60 TAB | Refills: 2 | Status: SHIPPED | OUTPATIENT
Start: 2018-10-30 | End: 2019-01-12

## 2018-10-30 RX ORDER — RISPERIDONE 1 MG/1
1 TABLET ORAL
Qty: 30 TAB | Refills: 1 | Status: SHIPPED | OUTPATIENT
Start: 2018-10-30 | End: 2019-01-12

## 2018-10-30 RX ORDER — MOMETASONE FUROATE 1 MG/G
CREAM TOPICAL
COMMUNITY
Start: 2009-07-06 | End: 2018-10-30

## 2018-10-30 RX ORDER — NAPROXEN SODIUM 500 MG/1
500 TABLET, FILM COATED, EXTENDED RELEASE ORAL
COMMUNITY
Start: 2010-09-08 | End: 2018-10-30

## 2018-10-30 NOTE — ASSESSMENT & PLAN NOTE
Pt has not contacted [facility] to get local Psychiatry info to establish w Psychiatry as instructed at her Sept visit here.  She has Bipolar Disorder. I have instructed her again that Bipolar Disorder is a challenging Psychiatric condition and medication management is often a challenge and that I would not be doing ongoing RX for her Psychiatric Medications and she needs to establish w Psychiatry for this monitoring and med management.  Given [facility] info again w directions to call.

## 2018-10-30 NOTE — PROGRESS NOTES
Chief Complaint:   Chief Complaint   Patient presents with   • Exposure to STD     never took antibiotic    • Hand Numbness     bilateral        HPI:  Earnestine is here today for medication refills, hand numbness, discussion regarding Bipolar Disorder.    Her past medical history includes:  Anxiety  Depression  Bipolar Disorder  PTSD  Hx of SIDS death of infant at 6 wks old ( 2016)  Migraine Headaches  Morbid Obesity  Hyeprtension  Tobacco use- smoking  Depo Provera injections in past  Bilateral Ankle Pain, chronic  Elevated Fasting BS/ PRE-DM  Vitamin D Deficiency  Bacterial Vaginosis( Gardnerella)      Previous Referrals Approved:  GYN- DR Tomlin  Ortho- None unless MRI ordered/completed, then probably [place] area per Referral Dept.  [facility] to get local Psychiatrist.     Previously Established with  Psychiatry- [facility]-DR Schmitt Psychiatrist        [place] shows  1/16/17 Percocet 5/325 # 30 by Tara Claudio  ---------------------------------------------------------     Review of Records:  9/5/18 Clinic appt for f/u on anxiety and depression, BP check. Vaginal Discharge  --> Pelvic Exam. RX Flagyl for Empiric Tx, swabs sent for testing for STD  To Continue Respirdal, and Call [facility] for Psychiatrist as discussed.  ---> 9/5/18 labs came back a few days later Pos for Gardnerella Vaginalis  Neg for Gc/Chlamydia, Neg Trich, Neg yeast.  8/14/18 Clinic visit w my colleague, Dr. Ayala. Pt w Bipolar Disorder requesting to start back on Psych Meds.   Has refills of Risperdal and Cogentin. To start. Referral placed to Psychiatry.     1/22/18 Clinic appt for Lab Results, f/u on chronic ankle pain. Started on Vitamin D, Discussed Pre- DM/elevated Fasting BS and need to lose wt.     12/19/17 New Patient clinic appointment. Chronic ankle pain, birth control, obesity/weight gain. Labs ordered, start Ortho-Cyclen, refer to GYN, refer to orthopedics. To continue w [facility] Psych[facility].     10/15/17 ER  "visit for Cough, RX for Albuterol, and Nicotine patches.  8/9/17 ER visit for Rolled ankle and headache xray negative.    Bilateral hand numbness  Reports intermittent bilat hand numbness.  States fingers go \"tingly\" and sometimes go numb  And rare to toes of feet.    Sometimes if writing or using spoon or doing activities causes this issue to be worse.    Discussed her known borderline Vit B12 deficiency and recommend we start her on vitamin B12 supplement.  Exam for possible carpal tunnel in both wrists were negative today.  Instructed patient if in 2-3 weeks no response of the vitamin B12 to do a trial of low-dose gabapentin.      Bipolar disorder (HCC)  Pt has not contacted [facility] to get local Psychiatry info to establish w Psychiatry as instructed at her Sept visit here.  She has Bipolar Disorder. I have instructed her again that Bipolar Disorder is a challenging Psychiatric condition and medication management is often a challenge and that I would not be doing ongoing RX for her Psychiatric Medications and she needs to establish w Psychiatry for this monitoring and med management.  Given [facility] info again w directions to call. Will refill PSYCH Meds today- Clonidine, Respirdal, and Propranolol  But states at times is depressed.   Denies suicidal ideation.    Vaginal discharge  Pt reports she never took medication for her dx of Gardnerella Vaginalis from Sept and has same discharge and irritation.  Asking for RX for the antibiotic again  We discussed that I would write for Flagyl RX and she is to not have anything with alcohol in it as it can cause a bad reaction.      Vitamin B12 deficiency  1/5/18 borderline low B12= 227  Having tingling to hands. Feet sometimes numb.  Discussed it may be due to low Vit B12.   Recommend supplement       Patient Active Problem List    Diagnosis Date Noted   • Bilateral hand numbness 10/30/2018   • Vitamin B12 deficiency 10/30/2018   • Vaginal discharge " 2018   • Sore throat 2018   • Bipolar disorder (HCC) 2018   • Morbid obesity with BMI of 45.0-49.9, adult ([facility]) 2018   • Elevated fasting blood sugar 2018   • Vitamin D deficiency 2018   • Psychiatric disorder 2017   • Encounter for birth control 2017   • Essential hypertension 2017   • Chronic pain of both ankles 2017   • SIDS (sudden infant death syndrome) with last baby at 6 weeks of age 2016   • Tobacco dependence says cut down to 5 cigarettes/day 2016       Allergies:Strattera [atomoxetine]    Medicines as of today:  Current Outpatient Prescriptions   Medication Sig Dispense Refill   • metroNIDAZOLE (FLAGYL) 500 MG Tab Take 1 Tab by mouth 3 times a day. 21 Tab 0   • cyanocobalamin (VITAMIN B12) 1000 MCG Tab Take 1 Tab by mouth every day. 30 Tab 5   • gabapentin (NEURONTIN) 100 MG Cap Take 1 Cap by mouth 3 times a day. 90 Cap 0   • propranolol (INDERAL) 10 MG Tab Take 1 Tab by mouth 2 times a day. 60 Tab 2   • cloNIDine (CATAPRES) 0.1 MG Tab Take 1 Tab by mouth 2 times a day. TAKE 1 TABLET BY MOUTH TWICE A DAY 60 Tab 2   • risperiDONE (RISPERDAL) 1 MG Tab Take 1 Tab by mouth every bedtime. 30 Tab 1     No current facility-administered medications for this visit.        Social History   Substance Use Topics   • Smoking status: Current Every Day Smoker     Packs/day: 1.00     Years: 9.00     Types: Cigarettes   • Smokeless tobacco: Never Used      Comment: Pt states smokin 1/2 pack a day.   • Alcohol use No       Past Medical History:   Diagnosis Date   • Anxiety     currently on medications.    • Anxiety disorder    • Bipolar disorder (HCC)    • Depression    • History of  x 3 needs another csections 2016   • Hypertension 2016    PIH   • Migraine    • PTSD (post-traumatic stress disorder)    • SIDS (sudden infant death syndrome) with last baby at 6 weeks of age 2016   • Tobacco dependence says cut down to 5 cigarettes/day  "7/19/2016       Family History   Problem Relation Age of Onset   • Family history unknown: Yes       ROS:  Review of Systems   See HPI Above    Exam:  Blood pressure 122/80, pulse 74, temperature 36.4 °C (97.5 °F), temperature source Temporal, resp. rate 20, height 1.6 m (5' 2.99\"), weight 124.3 kg (274 lb), last menstrual period 10/16/2018, SpO2 95 %, not currently breastfeeding. Body mass index is 48.55 kg/m².    General:  Well nourished, over-weight, well developed female in NAD  HENT:Head is grossly normal. PERRL.  Neck: Supple. Trachea is midline.  Pulmonary: Clear to ausculation .  Normal effort. No rales, ronchi, or wheezing.   Cardiovascular: Regular rate and rhythm.  Abdomen-Abdomen is soft, No tenderness.  Upper extremities- Strong = . Good ROM, Neg radial area wrist pain to palpation  Neg pain to flex and extension of wrists. Hands and fingers good ROM and sensation intact.  Skin to fingers warm and pink.  Lower extremities- neg for edema, redness, tenderness.  Neuro- A & O x 4. Speech clear and appropriate.    Current medications, allergies, and problem list reviewed with patient and updated in EPIC today.    Assessment/Plan:  1. Need for influenza vaccination  Influenza Vaccine Quad Injection >3Y (PF)   2. Bilateral hand numbness  gabapentin (NEURONTIN) 100 MG Cap trial after 2-3 wks of Vit B12 if no improvement  ( No injectable Vit B12 avail at our Clinic currently)   3. Bipolar disorder, current episode mixed, moderate (HCC)  propranolol (INDERAL) 10 MG Tab    cloNIDine (CATAPRES) 0.1 MG Tab    risperiDONE (RISPERDAL) 1 MG Tab   4. Vaginal discharge  metroNIDAZOLE (FLAGYL) 500 MG Tab   5. Bacterial vaginosis  metroNIDAZOLE (FLAGYL) 500 MG Tab   6. Vitamin B12 deficiency  cyanocobalamin (VITAMIN B12) 1000 MCG Tab   7. Psychiatric disorder  cloNIDine (CATAPRES) 0.1 MG Tab  Pt agrees to call [facility] for contact info for local Psychiatrist and to make appt to Establish w " Psychiatry for eval and medication management of Bipolar Disorder.       Return in about 6 weeks (around 12/11/2018) for f/u on numb hands, emotions.

## 2018-10-30 NOTE — ASSESSMENT & PLAN NOTE
Pt reports she never took medication for her dx of Gardnerella Vaginalis from Sept and has same discharge and irritation.  Asking for RX for the antibiotic again  We discussed that I would write for Flagyl RX and she is to not have anything with alcohol in it as it can cause a bad reaction.

## 2018-10-30 NOTE — ASSESSMENT & PLAN NOTE
"Reports intermittent bilat hand numbness.  States fingers go \"tingly\" and sometimes go numb  And rare to toes of feet.    Sometimes if writing or using spoon or doing activities causes this issue to be worse.    "

## 2018-12-11 ENCOUNTER — TELEPHONE (OUTPATIENT)
Dept: MEDICAL GROUP | Facility: MEDICAL CENTER | Age: 23
End: 2018-12-11

## 2018-12-11 NOTE — TELEPHONE ENCOUNTER
Left message with patient about no show to appointment today 12/11/18.  Explained that this was her 3rd no show, the no show policy and that she will be receiving a dismissal letter in the mail.

## 2019-01-12 ENCOUNTER — HOSPITAL ENCOUNTER (EMERGENCY)
Facility: MEDICAL CENTER | Age: 24
End: 2019-01-12
Attending: EMERGENCY MEDICINE
Payer: MEDICAID

## 2019-01-12 VITALS
HEART RATE: 88 BPM | DIASTOLIC BLOOD PRESSURE: 87 MMHG | TEMPERATURE: 98.9 F | HEIGHT: 63 IN | RESPIRATION RATE: 18 BRPM | SYSTOLIC BLOOD PRESSURE: 142 MMHG | OXYGEN SATURATION: 97 % | WEIGHT: 253.53 LBS | BODY MASS INDEX: 44.92 KG/M2

## 2019-01-12 DIAGNOSIS — Z59.00 HOMELESSNESS: ICD-10-CM

## 2019-01-12 DIAGNOSIS — L08.9 WOUND INFECTION: ICD-10-CM

## 2019-01-12 DIAGNOSIS — J06.9 UPPER RESPIRATORY TRACT INFECTION, UNSPECIFIED TYPE: ICD-10-CM

## 2019-01-12 DIAGNOSIS — T14.8XXA WOUND INFECTION: ICD-10-CM

## 2019-01-12 DIAGNOSIS — F15.10 METHAMPHETAMINE ABUSE (HCC): ICD-10-CM

## 2019-01-12 DIAGNOSIS — F31.62 BIPOLAR DISORDER, CURRENT EPISODE MIXED, MODERATE (HCC): ICD-10-CM

## 2019-01-12 DIAGNOSIS — F42.4 PICKING OWN SKIN: ICD-10-CM

## 2019-01-12 DIAGNOSIS — F99 PSYCHIATRIC DISORDER: ICD-10-CM

## 2019-01-12 DIAGNOSIS — F31.9 BIPOLAR AFFECTIVE DISORDER, REMISSION STATUS UNSPECIFIED (HCC): ICD-10-CM

## 2019-01-12 DIAGNOSIS — E53.8 VITAMIN B12 DEFICIENCY: ICD-10-CM

## 2019-01-12 DIAGNOSIS — Z76.0 MEDICATION REFILL: ICD-10-CM

## 2019-01-12 PROCEDURE — 99284 EMERGENCY DEPT VISIT MOD MDM: CPT

## 2019-01-12 PROCEDURE — 700102 HCHG RX REV CODE 250 W/ 637 OVERRIDE(OP): Performed by: EMERGENCY MEDICINE

## 2019-01-12 PROCEDURE — A9270 NON-COVERED ITEM OR SERVICE: HCPCS | Performed by: EMERGENCY MEDICINE

## 2019-01-12 RX ORDER — SULFAMETHOXAZOLE AND TRIMETHOPRIM 800; 160 MG/1; MG/1
1 TABLET ORAL ONCE
Status: COMPLETED | OUTPATIENT
Start: 2019-01-12 | End: 2019-01-12

## 2019-01-12 RX ORDER — PROPRANOLOL HYDROCHLORIDE 10 MG/1
10 TABLET ORAL 2 TIMES DAILY
Qty: 60 TAB | Refills: 2 | Status: SHIPPED | OUTPATIENT
Start: 2019-01-12 | End: 2019-03-07

## 2019-01-12 RX ORDER — CLONIDINE HYDROCHLORIDE 0.1 MG/1
0.1 TABLET ORAL 2 TIMES DAILY
Qty: 60 TAB | Refills: 0 | Status: SHIPPED | OUTPATIENT
Start: 2019-01-12 | End: 2019-03-07

## 2019-01-12 RX ORDER — RISPERIDONE 1 MG/1
1 TABLET ORAL ONCE
Status: COMPLETED | OUTPATIENT
Start: 2019-01-12 | End: 2019-01-12

## 2019-01-12 RX ORDER — CLONIDINE HYDROCHLORIDE 0.1 MG/1
0.1 TABLET ORAL ONCE
Status: COMPLETED | OUTPATIENT
Start: 2019-01-12 | End: 2019-01-12

## 2019-01-12 RX ORDER — RISPERIDONE 1 MG/1
1 TABLET ORAL EVERY EVENING
Status: DISCONTINUED | OUTPATIENT
Start: 2019-01-12 | End: 2019-01-12 | Stop reason: HOSPADM

## 2019-01-12 RX ORDER — RISPERIDONE 1 MG/1
1 TABLET ORAL 2 TIMES DAILY
Status: DISCONTINUED | OUTPATIENT
Start: 2019-01-12 | End: 2019-01-12

## 2019-01-12 RX ORDER — SULFAMETHOXAZOLE AND TRIMETHOPRIM 800; 160 MG/1; MG/1
1 TABLET ORAL 2 TIMES DAILY
Qty: 20 TAB | Refills: 0 | Status: SHIPPED | OUTPATIENT
Start: 2019-01-12 | End: 2019-01-22

## 2019-01-12 RX ORDER — RISPERIDONE 1 MG/1
1 TABLET ORAL
Qty: 30 TAB | Refills: 1 | Status: SHIPPED | OUTPATIENT
Start: 2019-01-12 | End: 2019-03-07

## 2019-01-12 RX ORDER — CEPHALEXIN 500 MG/1
500 CAPSULE ORAL 4 TIMES DAILY
Qty: 40 CAP | Refills: 0 | Status: SHIPPED | OUTPATIENT
Start: 2019-01-12 | End: 2019-01-22

## 2019-01-12 RX ORDER — CEPHALEXIN 500 MG/1
500 CAPSULE ORAL ONCE
Status: COMPLETED | OUTPATIENT
Start: 2019-01-12 | End: 2019-01-12

## 2019-01-12 RX ADMIN — CEPHALEXIN 500 MG: 500 CAPSULE ORAL at 07:24

## 2019-01-12 RX ADMIN — RISPERIDONE 1 MG: 1 TABLET ORAL at 07:55

## 2019-01-12 RX ADMIN — SULFAMETHOXAZOLE AND TRIMETHOPRIM 1 TABLET: 800; 160 TABLET ORAL at 07:24

## 2019-01-12 RX ADMIN — CLONIDINE HYDROCHLORIDE 0.1 MG: 0.1 TABLET ORAL at 07:24

## 2019-01-12 SDOH — ECONOMIC STABILITY - HOUSING INSECURITY: HOMELESSNESS UNSPECIFIED: Z59.00

## 2019-01-12 NOTE — ED TRIAGE NOTES
"Earnestine Knight  23 y.o. female  Chief Complaint   Patient presents with   • Skin Lesion     Pt states multiple infections/abscesses all over her body.     • Flu Like Symptoms     sore throat,nausea, fever, malaise x one week       Pt amb to triage with steady gait for above complaint.   Pt is alert and oriented, speaking in full sentences, follows commands and responds appropriately to questions. NAD. Resp are even and unlabored.  Pt placed in lobby. Pt educated on triage process. Pt encouraged to alert staff for any changes.  /117   Pulse 97   Temp 37.3 °C (99.1 °F) (Oral)   Resp 16   Ht 1.6 m (5' 3\")   Wt 115 kg (253 lb 8.5 oz)   SpO2 97%   BMI 44.91 kg/m²     "

## 2019-01-12 NOTE — ED PROVIDER NOTES
ED Provider Note    Scribed for Marcel Avitia M.D. by Eufemia Dorsey. 2019  6:44 AM    Primary care provider: None noted  Means of arrival: Walk in  History obtained from: Patient  History limited by: None     CHIEF COMPLAINT  Chief Complaint   Patient presents with   • Skin Lesion     Pt states multiple infections/abscesses all over her body.     • Flu Like Symptoms     sore throat,nausea, fever, malaise x one week       HPI  Earnestine Knight is a 23 y.o. female with a past history of depression, bipolar disorder, and drug use who presents to the Emergency Department for chief complaint of skin lesions present to the torso and extremities. She reports having associated redness. Patient also reports she has been feeling unwell with flu-like symptoms. She has been experiencing a sore throat. Patient admits to methamphetamine use and requests for mental health and psychiatric resources as well as refill for her psychiatric medications. She is afebrile.    REVIEW OF SYSTEMS  Pertinent positives include skin lesions, erythema, sore throat.   Pertinent negatives include no fever.    See HPI for further details.     PAST MEDICAL HISTORY   has a past medical history of Anxiety; Anxiety disorder; Bipolar disorder (HCC); Depression; History of  x 3 needs another csections (2016); Hypertension (); Migraine; PTSD (post-traumatic stress disorder); SIDS (sudden infant death syndrome) with last baby at 6 weeks of age (2016); and Tobacco dependence says cut down to 5 cigarettes/day (2016).    SURGICAL HISTORY   has a past surgical history that includes primary c section (); repeat c section (); repeat c section (11/3/2015); and repeat c section (2017).    SOCIAL HISTORY  Social History   Substance Use Topics   • Smoking status: Current Every Day Smoker     Packs/day: 1.00     Years: 9.00     Types: Cigarettes   • Smokeless tobacco: Never Used      Comment: Pt states smokin /2  "pack a day.   • Alcohol use No      History   Drug Use No       FAMILY HISTORY  Family History   Problem Relation Age of Onset   • Family history unknown: Yes       CURRENT MEDICATIONS  Home Medications     Reviewed by Justin Medrano R.N. (Registered Nurse) on 01/12/19 at 0633  Med List Status: <None>   Medication Last Dose Status   cloNIDine (CATAPRES) 0.1 MG Tab  Active   cyanocobalamin (VITAMIN B12) 1000 MCG Tab  Active   gabapentin (NEURONTIN) 100 MG Cap  Active   metroNIDAZOLE (FLAGYL) 500 MG Tab  Active   propranolol (INDERAL) 10 MG Tab  Active   risperiDONE (RISPERDAL) 1 MG Tab  Active              ALLERGIES  Allergies   Allergen Reactions   • Strattera [Atomoxetine] Shortness of Breath       PHYSICAL EXAM  VITAL SIGNS: /117   Pulse 97   Temp 37.3 °C (99.1 °F) (Oral)   Resp 16   Ht 1.6 m (5' 3\")   Wt 115 kg (253 lb 8.5 oz)   SpO2 97%   BMI 44.91 kg/m²     Nursing note and vitals reviewed.  Constitutional: Well-developed and well-nourished. No distress. Obese.   HENT: Head is normocephalic and atraumatic. Oropharynx is clear and moist without exudate or erythema.   Eyes: Pupils are equal, round, and reactive to light. Conjunctiva are normal.   Cardiovascular: Normal rate and regular rhythm. No murmur heard. Normal radial pulses.  Pulmonary/Chest: Breath sounds normal. No wheezes or rales.   Abdominal: Soft and non-tender. No distention    Musculoskeletal: Extremities exhibit normal range of motion without edema or tenderness.   Neurological: Awake, alert and oriented to person, place, and time. No focal deficits noted.  Skin: Skin is warm. Scattered excoriations with minimal surrounding erythema over torso and extremities. No abscess.   Psychiatric: Appropriate for clinical situation.    DIAGNOSTIC STUDIES / PROCEDURES    EKG Interpretation  No results found for this or any previous visit.    LABS  Labs Reviewed - No data to display  All labs reviewed by me.    RADIOLOGY  No orders to display "   The radiologist's interpretation of all radiological studies have been reviewed by me.    COURSE & MEDICAL DECISION MAKING  Nursing notes, VS, PMSFHx reviewed in chart.       6:44 AM Patient seen and examined at bedside. Patient will be treated with 0.1 mg clonidine, 1 mg Risperdal, 500 mg Keflex, and 800-160 mg Bactrim for her wounds. She will be discharged home with prescriptions for antibiotics and refills for her psychiatric medication. Patient would like to become sober and requests for supportive care. She will be given resources for rehabilitation and mental health facilities. Patient is comfortable to discharge home with instructions on supportive care. She will return for new or worsening symptoms and is stable at the time of discharge.      HTN/IDDM FOLLOW UP:  The patient has known hypertension and was referred to [facility] clinic for blood pressure management.     DISPOSITION:  Patient will be discharged home in stable condition.    FOLLOW UP:  [facility], Emergency Dept  [address] [facility] [address]  [phone]    If symptoms worsen    [address] [address]  [phone]  Schedule an appointment as soon as possible for a visit       The patient was discharged home with an information sheet on methamphetamine use and skin infection and told to return immediately for any signs or symptoms listed.  The patient agreed to the discharge precautions and follow-up plan which is documented in EPIC.     OUTPATIENT MEDICATIONS:  New Prescriptions    CEPHALEXIN (KEFLEX) 500 MG CAP    Take 1 Cap by mouth 4 times a day for 10 days.    SULFAMETHOXAZOLE-TRIMETHOPRIM (BACTRIM DS) 800-160 MG TABLET    Take 1 Tab by mouth 2 times a day for 10 days.     FINAL IMPRESSION  1. Methamphetamine abuse (HCC)    2. Wound infection    3. Picking own skin    4. Upper respiratory tract infection, unspecified type    5. Homelessness    6. Bipolar affective  disorder, remission status unspecified (HCC)    7. Medication refill    8. Psychiatric disorder    9. Bipolar disorder, current episode mixed, moderate (HCC)    10. Vitamin B12 deficiency       Eufemia FARFAN (Scribe), am scribing for, and in the presence of, Marcel Avitia M.D..  Electronically signed by: Eufemia Dorsey (Yumiko), 1/12/2019  Marcel FARFAN M.D. personally performed the services described in this documentation, as scribed by Eufemia Dorsey in my presence, and it is both accurate and complete. E.    The note accurately reflects work and decisions made by me.  Marcel Avitia  1/12/2019  11:14 AM

## 2019-01-12 NOTE — ED NOTES
Discharged to self pt instructed [facility]. Given prescriptions with indication.

## 2019-01-12 NOTE — ED NOTES
Pt ambulated from waiting room without difficulty, skin has multiple lesions, she thinks that someone might be putting something in the meth that she uses.

## 2019-01-30 ENCOUNTER — OFFICE VISIT (OUTPATIENT)
Dept: INTERNAL MEDICINE | Facility: MEDICAL CENTER | Age: 24
End: 2019-01-30
Payer: MEDICAID

## 2019-01-30 VITALS
DIASTOLIC BLOOD PRESSURE: 96 MMHG | BODY MASS INDEX: 46.46 KG/M2 | HEIGHT: 63 IN | TEMPERATURE: 97.5 F | SYSTOLIC BLOOD PRESSURE: 122 MMHG | OXYGEN SATURATION: 97 % | HEART RATE: 86 BPM | WEIGHT: 262.2 LBS

## 2019-01-30 DIAGNOSIS — F19.11 HISTORY OF INTRAVENOUS DRUG ABUSE (HCC): ICD-10-CM

## 2019-01-30 DIAGNOSIS — Z11.3 ENCOUNTER FOR SCREENING EXAMINATION FOR SEXUALLY TRANSMITTED DISEASE: ICD-10-CM

## 2019-01-30 DIAGNOSIS — F90.9 ATTENTION DEFICIT HYPERACTIVITY DISORDER (ADHD), UNSPECIFIED ADHD TYPE: ICD-10-CM

## 2019-01-30 DIAGNOSIS — Z00.00 HEALTHCARE MAINTENANCE: ICD-10-CM

## 2019-01-30 DIAGNOSIS — R06.83 HABITUAL SNORING: ICD-10-CM

## 2019-01-30 DIAGNOSIS — E66.01 MORBID OBESITY WITH BMI OF 45.0-49.9, ADULT (HCC): ICD-10-CM

## 2019-01-30 DIAGNOSIS — E53.8 VITAMIN B12 DEFICIENCY: ICD-10-CM

## 2019-01-30 DIAGNOSIS — N92.6 LATE MENSES: ICD-10-CM

## 2019-01-30 PROBLEM — J02.9 SORE THROAT: Status: RESOLVED | Noted: 2018-08-14 | Resolved: 2019-01-30

## 2019-01-30 PROBLEM — N89.8 VAGINAL DISCHARGE: Status: RESOLVED | Noted: 2018-09-05 | Resolved: 2019-01-30

## 2019-01-30 PROBLEM — I10 ESSENTIAL HYPERTENSION: Status: RESOLVED | Noted: 2017-12-19 | Resolved: 2019-01-30

## 2019-01-30 LAB
HBA1C MFR BLD: 5.2 % (ref ?–5.8)
INT CON NEG: NORMAL
INT CON POS: NORMAL

## 2019-01-30 PROCEDURE — 83036 HEMOGLOBIN GLYCOSYLATED A1C: CPT | Mod: GC | Performed by: INTERNAL MEDICINE

## 2019-01-30 PROCEDURE — 99203 OFFICE O/P NEW LOW 30 MIN: CPT | Mod: GE | Performed by: INTERNAL MEDICINE

## 2019-01-30 NOTE — PROGRESS NOTES
"New Patient to Establish    Reason to establish: ED follow-up    CC: ED follow up, check for diabetes    HPI:    Earnestine Knight 24 yo woman with history of polysubstance abuse here to establish care after recently being seen in ED for soft tissue infection. She was discharged on cephalexin and bactrim.       Skin and soft tissue infection   Finished course of cephalexin and bactrim for 10 days total.   Reports malaise and sore throat for over a week. HIV testing in the past negative in 2016, but she reports IV drug use since then.        Polysubstance abuse   Previously used intravenous meth and heroin, last about one month ago. Sober now for last 2 weeks after living at [facility].  Mother is still actively using \"crack cocaine\" and is in an abusive relationship with her boyfriend. Not in contact with her anymore.       FMHx: no hx of pancreatic, colon, breast or ovarian cancer. Grandfather (maternal) with lung cancer       Gyn Hx: unfortunately, her 6 week old infant passed away due to SIDS in 2016. . She was 17 during her first pregnancy. Last pap smear in 2018. Thinks she has a history of abnormal pap smear  and hx of sexually transmitted infections, but she is not sure. Periods occur every month, last 5 days, occasional cramping but not very painful. LMP one month ago (late by 1 week).       Social Hx: lives at [facility] which is a sober living program for the last two weeks. Her 1 yo son is not living there.    Not employed but hoping to work in traffic as soon as able to.   EtOH --- none currently since she is in a sober living facility.   Tobacco --- 13 years old when she started smoking, used to smoke up to 1 ppd previously but now cut it down to 2 cigarettes. Willing to quit completely but states she is not there yet.   Drug use --- sober for last two weeks. Used IV methamphetamine, heroin in the past.     Patient Active Problem List    Diagnosis Date Noted   • ADHD 2019   • Bilateral hand " numbness 10/30/2018   • Vitamin B12 deficiency 10/30/2018   • Bipolar disorder ([facility]) 2018   • Morbid obesity with BMI of 45.0-49.9, adult ([facility]) 2018   • Elevated fasting blood sugar 2018   • Vitamin D deficiency 2018   • Psychiatric disorder 2017   • Encounter for birth control 2017   • Chronic pain of both ankles 2017   • SIDS (sudden infant death syndrome) with last baby at 6 weeks of age 2016   • Tobacco dependence says cut down to 5 cigarettes/day 2016       Past Medical History:   Diagnosis Date   • Anxiety     currently on medications.    • Anxiety disorder    • Bipolar disorder ([facility])    • Depression    • History of  x 3 needs another csections 2016   • Hypertension 2016    PIH   • Migraine    • PTSD (post-traumatic stress disorder)    • SIDS (sudden infant death syndrome) with last baby at 6 weeks of age 2016   • Tobacco dependence says cut down to 5 cigarettes/day 2016       Current Outpatient Prescriptions   Medication Sig Dispense Refill   • cloNIDine (CATAPRES) 0.1 MG Tab Take 1 Tab by mouth 2 times a day. TAKE 1 TABLET BY MOUTH TWICE A DAY 60 Tab 0   • propranolol (INDERAL) 10 MG Tab Take 1 Tab by mouth 2 times a day. 60 Tab 2   • risperiDONE (RISPERDAL) 1 MG Tab Take 1 Tab by mouth every bedtime. (Patient taking differently: Take 2 mg by mouth every bedtime.) 30 Tab 1     No current facility-administered medications for this visit.        Allergies as of 2019 - Reviewed 2019   Allergen Reaction Noted   • Atomoxetine hcl  2004   • Strattera [atomoxetine] Shortness of Breath 09/15/2018       Social History     Social History   • Marital status: Single     Spouse name: N/A   • Number of children: N/A   • Years of education: N/A     Occupational History   • Not on file.     Social History Main Topics   • Smoking status: Current Every Day Smoker     Packs/day: 0.25     Years: 9.00     Types: Cigarettes   •  "Smokeless tobacco: Never Used      Comment: Pt states smokin 1/2 pack a day.   • Alcohol use No   • Drug use: No   • Sexual activity: Not Currently      Comment: none     Other Topics Concern   • Not on file     Social History Narrative   • No narrative on file       Family History   Problem Relation Age of Onset   • Alcohol/Drug Mother         \"Crack\" cocaine   • Alcohol/Drug Father    • Heart Disease Maternal Aunt 40        heart disease, pacemaker   • Diabetes Maternal Grandfather    • Cancer Maternal Grandfather         lung cancer       Past Surgical History:   Procedure Laterality Date   • REPEAT C SECTION  1/11/2017    Procedure: REPEAT C SECTION;  Surgeon: Yu Prabhakar M.D.;  Location: LABOR AND DELIVERY;  Service:    • REPEAT C SECTION  11/3/2015    Procedure: REPEAT C SECTION;  Surgeon: Ricky Vega M.D.;  Location: LABOR AND DELIVERY;  Service:    • REPEAT C SECTION  2014   • PRIMARY C SECTION  2013       ROS: As per HPI. Additional pertinent systems as noted below.  Constitutional: Negative for chills and fever.   HENT: Negative for ear pain and sore throat.    Eyes: Negative for discharge and redness.   Respiratory: Negative for cough, hemoptysis, wheezing and stridor.    Cardiovascular: Negative for chest pain, palpitations and leg swelling.   Gastrointestinal: Negative for abdominal pain, constipation, diarrhea, heartburn, nausea and vomiting.   Genitourinary: Negative for dysuria, flank pain and hematuria.   Musculoskeletal: Negative for falls and myalgias.   Skin: Negative for itching and rash.   Neurological: Negative for dizziness, seizures, loss of consciousness and headaches.   Endo/Heme/Allergies: Negative for polydipsia. Does not bruise/bleed easily.   Psychiatric/Behavioral: Negative for substance abuse and suicidal ideas.       /96 (BP Location: Left arm, BP Cuff Size: Large adult long)   Pulse 86   Temp 36.4 °C (97.5 °F)   Ht 1.601 m (5' 3.05\")   Wt 118.9 kg (262 lb 3.2 oz) "   SpO2 97%   BMI 46.37 kg/m²     Physical Exam  General:  Alert and oriented, No apparent distress. Pleasant and conversant, obese habitus, in NAD     Eyes: Pupils equal and reactive. No scleral icterus.    Throat: Clear no erythema or exudates noted.    Neck: Supple. No lymphadenopathy noted. Thyroid not enlarged.    Lungs: Clear to auscultation bilaterally.    Cardiovascular: Regular rate and rhythm. No murmurs, rubs or gallops.    Abdomen:  Benign. No rebound or guarding noted.    Extremities: No clubbing, cyanosis, edema.    Skin: Clear. No rash or suspicious skin lesions noted.    Neuro: A&O x 4. CN II-XII intact. Normal gait. No intention or resting tremor. Negative Romberg. Bilateral patellar DTR 2+ equally. No nystagmus. No dysdiadochokinesia.     Note: I have reviewed all pertinent labs and diagnostic tests associated with this visit with specific comments listed under the assessment and plan below    Assessment and Plan    1. Morbid obesity with BMI of 45.0-49.9, adult (HCC)  - counseled regarding importance of weight loss given BMI >40, long term risks of heart disease, diabetes, sleep apnea, etc. Pt states she is motivated to change her life including drug and tobacco cessation along with eventually starting on a weight loss program. She requested details on weight management today   - given referral to weight management today per patient's request    - POCT A1c 5.2% today    - REFERRAL TO [facility] IMPROVEMENT PROGRAMS (HIP) Services Requested: Physician Medical Weight Management Program, Registered Dietitian for Medical Nutrition Therapy, Registered Dietitian Medicare Obesity Counseling; Reason for Referral? BMI>30...  - REFERRAL TO SLEEP STUDIES      2. Habitual snoring  - STOPBANG score 3 indicating high risk for DRE: snoring, fatigue and daytime somnolence, BMI >45   - REFERRAL TO SLEEP STUDIES   - discussed benefits of weight loss given suspected DRE       3. Encounter for screening  examination for sexually transmitted disease  - no longer sexually active since she is staying at a sober house   - HIV AG/AB COMBO ASSAY SCREENING; Future  - HCG QUAL SERUM; Future  - CHLAMYDIA/GC PCR URINE OR SWAB; Future  - RPR      4. History of intravenous drug abuse  - HIV and hepatitis testing. States she is no longer using IV drugs and staying at a sober house to help with drug cessation   - HEPATITIS PANEL ACUTE(4 COMPONENTS); Future      5. Vitamin B12 deficiency  - vitamin B12 in the low end of normal, discussed supplementation       6. Late menses  - HCG QUAL SERUM; Future      7. Attention deficit hyperactivity disorder (ADHD), unspecified ADHD type  - followed by psychiatry       Followup: Return in about 5 weeks (around 3/6/2019) for Long.  Discuss lab work, weight loss, sleep studies, vaccinations       Signed by: Belkys Orellana M.D.

## 2019-01-31 NOTE — PATIENT INSTRUCTIONS
Congratulations on changing your lifestyle!   Get your blood work done as soon as possible -- I will call you if there is any abnormal blood test results   Referring you to health and weight management program   Keep on cutting down on cigarettes, you are doing great   Referring you to a sleep study program     Sleep Studies  A sleep study (polysomnogram) is a series of tests done while you are sleeping. It can show how well you sleep. This can help your health care provider diagnose a sleep disorder and show how severe your sleep disorder is. A sleep study may lead to treatment that will help you sleep better and prevent other medical problems caused by poor sleep.  If you have a sleep disorder, you may also be at risk for:  · Sleep-related accidents.  · High blood pressure.  · Heart disease.  · Stroke.  · Other medical conditions.  Sleep disorders are common. Your health care provider may suspect a sleep disorder if you:  · Have loud snoring most nights.  · Have brief periods when you stop breathing at night.  · Feel sleepy on most days.  · Fall asleep suddenly during the day.  · Have trouble falling asleep or staying asleep.  · Feel like you need to move your legs when trying to fall asleep.  · Have dreams that seem very real shortly after falling asleep.  · Feel like you cannot move when you first wake up.  Which tests will I need to have?  Most sleep studies last all night and include these tests:  · Recordings of your brain activity.  · Recordings of your eye movements.  · Recording of your heart rate and rhythm.  · Blood pressure readings.  · Readings of the amount of oxygen in your blood.  · Measurements of your chest and belly movement as you breathe during sleep.  If you have signs of the sleep disorder called sleep apnea during your test, you may get a mask to wear for the second half of the night.  · The mask provides continuous positive airway pressure (CPAP). This may improve sleep apnea  significantly.  · You will then have all tests done again with the mask in place to see if your measurements and recordings change.  How are sleep studies done?  Most sleep studies are done over one full night of sleep.  · You will arrive at the study center in the evening and can go home in the morning.  · Bring your pajamas and toothbrush.  · Do not have caffeine on the day of your sleep study.  · Your health care provider will let you know if you need to stop taking any of your regular medicines before the test.  To do the tests included in a polysomnogram, you will have:  · Round, sticky patches with sensors attached to recording wires (electrodes) placed on your scalp, face, chest, and limbs.  · Wires from all the electrodes and sensors run from your bed to a computer. The wires can be taken off and put back on if you need to get out of bed to go to the bathroom.  · A sensor placed over your nose to measure airflow.  · A finger clip put on one finger to measure your blood oxygen level.  · A belt around your belly and a belt around your chest to measure breathing movements.  Where are sleep studies done?  Sleep studies are done at sleep centers. A sleep center may be inside a hospital, office, or clinic.  The room where you have the study may look like a hospital room or a hotel room. The health care providers doing the study may come in and out of the room during the study. Most of the time, they will be in another room monitoring your test.  How is information from sleep studies helpful?  A polysomnogram can be used along with your medical history and a physical exam to diagnose conditions, such as:  · Sleep apnea.  · Restless legs syndrome.  · Sleep-related seizure disorders.  · Sleep-related movement disorders.  A medical doctor who specializes in sleep will evaluate your sleep study. The specialist will share the results with your primary health care provider. Treatments based on your sleep study may  include:  · Improving your sleep habits (sleep hygiene).  · Wearing a CPAP mask.  · Wearing an oral device at night to improve breathing and reduce snoring.  · Taking medicine for:  ¨ Restless legs syndrome.  ¨ Sleep-related seizure disorder.  ¨ Sleep-related movement disorder.  This information is not intended to replace advice given to you by your health care provider. Make sure you discuss any questions you have with your health care provider.  Document Released: 06/23/2004 Document Revised: 08/13/2017 Document Reviewed: 02/23/2015  [employer] Interactive Patient Education © 2017 [employer]    Calorie Counting for Weight Loss  Calories are energy you get from the things you eat and drink. Your body uses this energy to keep you going throughout the day. The number of calories you eat affects your weight. When you eat more calories than your body needs, your body stores the extra calories as fat. When you eat fewer calories than your body needs, your body burns fat to get the energy it needs.  Calorie counting means keeping track of how many calories you eat and drink each day. If you make sure to eat fewer calories than your body needs, you should lose weight. In order for calorie counting to work, you will need to eat the number of calories that are right for you in a day to lose a healthy amount of weight per week. A healthy amount of weight to lose per week is usually 1-2 lb (0.5-0.9 kg). A dietitian can determine how many calories you need in a day and give you suggestions on how to reach your calorie goal.   WHAT IS MY MY PLAN?  My goal is to have __________ calories per day.   If I have this many calories per day, I should lose around __________ pounds per week.  WHAT DO I NEED TO KNOW ABOUT CALORIE COUNTING?  In order to meet your daily calorie goal, you will need to:  · Find out how many calories are in each food you would like to eat. Try to do this before you eat.  · Decide how much of the food you can  eat.  · Write down what you ate and how many calories it had. Doing this is called keeping a food log.  WHERE DO I FIND CALORIE INFORMATION?  The number of calories in a food can be found on a Nutrition Facts label. Note that all the information on a label is based on a specific serving of the food. If a food does not have a Nutrition Facts label, try to look up the calories online or ask your dietitian for help.  HOW DO I DECIDE HOW MUCH TO EAT?  To decide how much of the food you can eat, you will need to consider both the number of calories in one serving and the size of one serving. This information can be found on the Nutrition Facts label. If a food does not have a Nutrition Facts label, look up the information online or ask your dietitian for help.  Remember that calories are listed per serving. If you choose to have more than one serving of a food, you will have to multiply the calories per serving by the amount of servings you plan to eat. For example, the label on a package of bread might say that a serving size is 1 slice and that there are 90 calories in a serving. If you eat 1 slice, you will have eaten 90 calories. If you eat 2 slices, you will have eaten 180 calories.  HOW DO I KEEP A FOOD LOG?  After each meal, record the following information in your food log:  · What you ate.  · How much of it you ate.  · How many calories it had.  · Then, add up your calories.  Keep your food log near you, such as in a small notebook in your pocket. Another option is to use a mobile suzie or website. Some programs will calculate calories for you and show you how many calories you have left each time you add an item to the log.  WHAT ARE SOME CALORIE COUNTING TIPS?  · Use your calories on foods and drinks that will fill you up and not leave you hungry. Some examples of this include foods like nuts and nut butters, vegetables, lean proteins, and high-fiber foods (more than 5 g fiber per serving).  · Eat nutritious  foods and avoid empty calories. Empty calories are calories you get from foods or beverages that do not have many nutrients, such as candy and soda. It is better to have a nutritious high-calorie food (such as an avocado) than a food with few nutrients (such as a bag of chips).  · Know how many calories are in the foods you eat most often. This way, you do not have to look up how many calories they have each time you eat them.  · Look out for foods that may seem like low-calorie foods but are really high-calorie foods, such as baked goods, soda, and fat-free candy.  · Pay attention to calories in drinks. Drinks such as sodas, specialty coffee drinks, alcohol, and juices have a lot of calories yet do not fill you up. Choose low-calorie drinks like water and diet drinks.  · Focus your calorie counting efforts on higher calorie items. Logging the calories in a garden salad that contains only vegetables is less important than calculating the calories in a milk shake.  · Find a way of tracking calories that works for you. Get creative. Most people who are successful find ways to keep track of how much they eat in a day, even if they do not count every calorie.  WHAT ARE SOME PORTION CONTROL TIPS?  · Know how many calories are in a serving. This will help you know how many servings of a certain food you can have.  · Use a measuring cup to measure serving sizes. This is helpful when you start out. With time, you will be able to estimate serving sizes for some foods.  · Take some time to put servings of different foods on your favorite plates, bowls, and cups so you know what a serving looks like.  · Try not to eat straight from a bag or box. Doing this can lead to overeating. Put the amount you would like to eat in a cup or on a plate to make sure you are eating the right portion.  · Use smaller plates, glasses, and bowls to prevent overeating. This is a quick and easy way to practice portion control. If your plate is  "smaller, less food can fit on it.  · Try not to multitask while eating, such as watching TV or using your computer. If it is time to eat, sit down at a table and enjoy your food. Doing this will help you to start recognizing when you are full. It will also make you more aware of what and how much you are eating.  HOW CAN I CALORIE COUNT WHEN EATING OUT?  · Ask for smaller portion sizes or child-sized portions.  · Consider sharing an entree and sides instead of getting your own entree.  · If you get your own entree, eat only half. Ask for a box at the beginning of your meal and put the rest of your entree in it so you are not tempted to eat it.  · Look for the calories on the menu. If calories are listed, choose the lower calorie options.  · Choose dishes that include vegetables, fruits, whole grains, low-fat dairy products, and lean protein. Focusing on smart food choices from each of the 5 food groups can help you stay on track at restaurants.  · Choose items that are boiled, broiled, grilled, or steamed.  · Choose water, milk, unsweetened iced tea, or other drinks without added sugars. If you want an alcoholic beverage, choose a lower calorie option. For example, a regular aracely can have up to 700 calories and a glass of wine has around 150.  · Stay away from items that are buttered, battered, fried, or served with cream sauce. Items labeled \"crispy\" are usually fried, unless stated otherwise.  · Ask for dressings, sauces, and syrups on the side. These are usually very high in calories, so do not eat much of them.  · Watch out for salads. Many people think salads are a healthy option, but this is often not the case. Many salads come with saravia, fried chicken, lots of cheese, fried chips, and dressing. All of these items have a lot of calories. If you want a salad, choose a garden salad and ask for grilled meats or steak. Ask for the dressing on the side, or ask for olive oil and vinegar or lemon to use as " dressing.  · Estimate how many servings of a food you are given. For example, a serving of cooked rice is ½ cup or about the size of half a tennis ball or one cupcake wrapper. Knowing serving sizes will help you be aware of how much food you are eating at restaurants. The list below tells you how big or small some common portion sizes are based on everyday objects.  ¨ 1 oz--4 stacked dice.  ¨ 3 oz--1 deck of cards.  ¨ 1 tsp--1 dice.  ¨ 1 Tbsp--½ a Ping-Pong ball.  ¨ 2 Tbsp--1 Ping-Pong ball.  ¨ ½ cup--1 tennis ball or 1 cupcake wrapper.  ¨ 1 cup--1 baseball.     This information is not intended to replace advice given to you by your health care provider. Make sure you discuss any questions you have with your health care provider.     Document Released: 12/18/2006 Document Revised: 01/08/2016 Document Reviewed: 10/23/2014  [employer] Interactive Patient Education ©2016 [employer]

## 2019-02-02 ENCOUNTER — HOSPITAL ENCOUNTER (EMERGENCY)
Facility: MEDICAL CENTER | Age: 24
End: 2019-02-02
Attending: EMERGENCY MEDICINE
Payer: MEDICAID

## 2019-02-02 VITALS
HEIGHT: 63 IN | TEMPERATURE: 98.7 F | HEART RATE: 75 BPM | DIASTOLIC BLOOD PRESSURE: 93 MMHG | OXYGEN SATURATION: 98 % | SYSTOLIC BLOOD PRESSURE: 143 MMHG | WEIGHT: 270.95 LBS | RESPIRATION RATE: 20 BRPM | BODY MASS INDEX: 48.01 KG/M2

## 2019-02-02 DIAGNOSIS — K02.9 PAIN DUE TO DENTAL CARIES: ICD-10-CM

## 2019-02-02 PROCEDURE — 99284 EMERGENCY DEPT VISIT MOD MDM: CPT

## 2019-02-02 PROCEDURE — 64400 NJX AA&/STRD TRIGEMINAL NRV: CPT

## 2019-02-02 PROCEDURE — 700102 HCHG RX REV CODE 250 W/ 637 OVERRIDE(OP): Performed by: EMERGENCY MEDICINE

## 2019-02-02 PROCEDURE — A9270 NON-COVERED ITEM OR SERVICE: HCPCS | Performed by: EMERGENCY MEDICINE

## 2019-02-02 RX ORDER — CLINDAMYCIN HYDROCHLORIDE 150 MG/1
300 CAPSULE ORAL ONCE
Status: COMPLETED | OUTPATIENT
Start: 2019-02-02 | End: 2019-02-02

## 2019-02-02 RX ORDER — CLINDAMYCIN HYDROCHLORIDE 300 MG/1
300 CAPSULE ORAL 4 TIMES DAILY
Qty: 40 CAP | Refills: 0 | Status: SHIPPED | OUTPATIENT
Start: 2019-02-02 | End: 2019-02-12

## 2019-02-02 RX ORDER — BUPIVACAINE HYDROCHLORIDE AND EPINEPHRINE 5; 5 MG/ML; UG/ML
10 INJECTION, SOLUTION EPIDURAL; INTRACAUDAL; PERINEURAL ONCE
Status: DISCONTINUED | OUTPATIENT
Start: 2019-02-02 | End: 2019-02-03 | Stop reason: HOSPADM

## 2019-02-02 RX ADMIN — CLINDAMYCIN HYDROCHLORIDE 300 MG: 150 CAPSULE ORAL at 21:30

## 2019-02-03 NOTE — ED TRIAGE NOTES
"Earnestine Knight  23 y.o. female  Chief Complaint   Patient presents with   • Tooth Abscess     x 3 months, pain top right lateral incisor appears to be broken. Pain radiates into nose pt states.    /93   Pulse 75   Temp 37.1 °C (98.7 °F) (Temporal)   Resp 20   Ht 1.6 m (5' 3\")   Wt 122.9 kg (270 lb 15.1 oz)   SpO2 98%   BMI 48.00 kg/m²     Pt amb to triage with steady gait for above complaint. Pt denies fever chills, VSS. Pt has no other medical complaints. OTC not working for pain, next apt with DDM set for February.    Pt is alert and oriented, speaking in full sentences, follows commands and responds appropriately to questions. NAD. Resp are even and unlabored.  Pt placed in lobby. Pt educated on triage process. Pt encouraged to alert staff for any changes.    "

## 2019-02-03 NOTE — ED NOTES
Patient given dc instructions and prescription, she understands to follow up with dentist and ambulated out of department with friend.

## 2019-02-03 NOTE — ED PROVIDER NOTES
ED Provider Note    CHIEF COMPLAINT  Chief Complaint   Patient presents with   • Tooth Abscess     x 3 months, pain top right lateral incisor appears to be broken. Pain radiates into nose pt states.         HPI    Primary care provider: No primary care provider on file.   History obtained from: Patient  History limited by: None     Earnestine Knight is a 23 y.o. female who presents to the ED complaining of left upper dental pain for about 3 months but worsening over the past few days.  Patient states that she has an appointment with a dentist later this month.  She reports feeling feverish but did not check her temperature.  She has had a little bit of nausea but no vomiting.  She denies shortness of breath/difficulty breathing/cough/difficulty swallowing/diarrhea/constipation/dysuria/rash.  She denies possibility of pregnancy.  She feels the pain is radiating up to the left side of her face and is concerned about infection.  She has been taking acetaminophen without improvement of her pain.  Patient states that she is on treatment for her IV drug use and does not want any narcotic pain medicine.    REVIEW OF SYSTEMS  Please see HPI for pertinent positives/negatives.     PAST MEDICAL HISTORY  Past Medical History:   Diagnosis Date   • History of  x 3 needs another csections 2016   • SIDS (sudden infant death syndrome) with last baby at 6 weeks of age 2016   • Tobacco dependence says cut down to 5 cigarettes/day 2016   • Hypertension 2016    PIH   • Anxiety     currently on medications.    • Anxiety disorder    • Bipolar disorder (HCC)    • Depression    • Migraine    • PTSD (post-traumatic stress disorder)         SURGICAL HISTORY  Past Surgical History:   Procedure Laterality Date   • REPEAT C SECTION  2017    Procedure: REPEAT C SECTION;  Surgeon: Yu Prabhakar M.D.;  Location: LABOR AND DELIVERY;  Service:    • REPEAT C SECTION  11/3/2015    Procedure: REPEAT C SECTION;   "Surgeon: Ricky Vega M.D.;  Location: LABOR AND DELIVERY;  Service:    • REPEAT C SECTION  2014   • PRIMARY C SECTION  2013        SOCIAL HISTORY  Social History     Social History Main Topics   • Smoking status: Current Every Day Smoker     Packs/day: 0.25     Years: 9.00     Types: Cigarettes   • Smokeless tobacco: Never Used      Comment: Pt states smokin 1/2 pack a day.   • Alcohol use No   • Drug use: No   • Sexual activity: Not Currently      Comment: none        FAMILY HISTORY  Family History   Problem Relation Age of Onset   • Alcohol/Drug Mother         \"Crack\" cocaine   • Alcohol/Drug Father    • Heart Disease Maternal Aunt 40        heart disease, pacemaker   • Diabetes Maternal Grandfather    • Cancer Maternal Grandfather         lung cancer        CURRENT MEDICATIONS  Home Medications     Reviewed by Marcell Butler R.N. (Registered Nurse) on 02/02/19 at [facility]  Med List Status: Complete   Medication Last Dose Status   cloNIDine (CATAPRES) 0.1 MG Tab 2/2/2019 Active   propranolol (INDERAL) 10 MG Tab 2/2/2019 Active   risperiDONE (RISPERDAL) 1 MG Tab 2/2/2019 Active                 ALLERGIES  Allergies   Allergen Reactions   • Atomoxetine Hcl      Other reaction(s): Wheezing/Bronchospasm   • Strattera [Atomoxetine] Shortness of Breath        PHYSICAL EXAM  VITAL SIGNS: /93   Pulse 75   Temp 37.1 °C (98.7 °F) (Temporal)   Resp 20   Ht 1.6 m (5' 3\")   Wt 122.9 kg (270 lb 15.1 oz)   SpO2 98%   BMI 48.00 kg/m²  @YANDEL[[id number]::@     Pulse ox interpretation: 98% I interpret this pulse ox as normal     Constitutional: Well developed, well nourished, alert in no apparent distress, nontoxic appearance    HENT: No external signs of trauma, normocephalic, EACs and TMs are clear bilaterally, oropharynx moist and clear, airway is widely patent, uvula is midline, no trismus/drooling/stridor, patient speaking with normal voice without difficulty, scattered dental decay with severe decay of left upper " incisor with tenderness to palpation, no gingival swelling/erythema/drainage, nose normal    Eyes: PERRL, conjunctiva without erythema, no discharge, no icterus    Neck: Soft and supple, trachea midline, no stridor, no tenderness/fullness, no LAD, no JVD, good ROM    Cardiovascular: Regular rate and rhythm, no murmurs/rubs/gallops, strong distal pulses and good perfusion    Thorax & Lungs: No respiratory distress, CTAB   Abdomen: Soft, nontender, nondistended, no guarding, no rebound, normal BS    Back: No CVAT    Extremities: No cyanosis, no edema, no gross deformity, good ROM, intact distal pulses with brisk cap refill    Skin: Warm, dry, no pallor/cyanosis, no rash noted    Lymphatic: No lymphadenopathy noted    Neuro: A/O times 3, no focal deficits noted    Psychiatric: Cooperative        DIAGNOSTIC STUDIES / PROCEDURES    Patient gave verbal consent for dental block after risks/benefits/indications explained.  Patient was positioned for optimal exposure.  Landmarks were identified.  Left superior posterior alveolar block using 10 mL of 0.5% bupivacaine with epinephrine was performed without complication.      LABS  All labs reviewed by me.     Results for orders placed or performed in visit on 01/30/19   POCT A1C   Result Value Ref Range    Glycohemoglobin 5.2 %    Internal Control Negative Valid     Internal Control Positive Valid         RADIOLOGY  The radiologist's interpretation of all radiological studies have been reviewed by me.     No orders to display          COURSE & MEDICAL DECISION MAKING  Nursing notes, VS, PMSFHx reviewed in chart.     Review of past medical records shows the patient was last seen in this ED January 12, 2019 for skin lesions and flulike symptoms and was prescribed Bactrim and Keflex for her skin infections.  She was seen in the office on January 30, 2019 for polysubstance abuse.      Differential diagnoses considered include but are not limited to: Dental caries, dental  fracture/avulsion, abscess, gingivitis, periodontitis, stomatitis       History and physical exam as above.  Patient noted to have several areas of dental decay worse in the left upper incisor but otherwise benign exam.  No signs of airway compromise, pharyngeal abscess or Escobar angina.  Dental block was performed without complication as above and patient reports pain is much improved afterwards.  She will be started on clindamycin.  She can continue with acetaminophen/ibuprofen as needed.  She was advised to follow-up with dentist ASAP.  I discussed with patient worrisome signs and symptoms and return to ED precautions.  Patient also noted to have slightly elevated blood pressure and will need outpatient follow-up for further management.  Patient verbalized understanding and agreed with plan of care with no further questions or concerns.      The patient is referred to a primary physician for blood pressure management, diabetic screening, and for all other preventative health concerns.       FINAL IMPRESSION  1. Pain due to dental caries Acute          DISPOSITION  Patient will be discharged home in stable condition.       FOLLOW UP  Please follow up with dentist ASAP    Schedule an appointment as soon as possible for a visit       [facility], Emergency Dept  [address] [facility] [id number] [phone]    If symptoms worsen    [address] [address]  [phone]  Schedule an appointment as soon as possible for a visit            OUTPATIENT MEDICATIONS  Discharge Medication List as of 2/2/2019 10:10 PM      START taking these medications    Details   clindamycin (CLEOCIN) 300 MG Cap Take 1 Cap by mouth 4 times a day for 10 days., Disp-40 Cap, R-0, Print Rx Paper                Electronically signed by: Satya Lopez, 2/2/2019 9:10 PM      Portions of this record were made with voice recognition software.  Despite my review,  spelling/grammar/context errors may still remain.  Interpretation of this chart should be taken in this context.

## 2019-02-05 DIAGNOSIS — Z11.3 ENCOUNTER FOR SCREENING EXAMINATION FOR SEXUALLY TRANSMITTED DISEASE: ICD-10-CM

## 2019-02-05 DIAGNOSIS — N92.6 LATE MENSES: ICD-10-CM

## 2019-03-06 ENCOUNTER — OFFICE VISIT (OUTPATIENT)
Dept: INTERNAL MEDICINE | Facility: MEDICAL CENTER | Age: 24
End: 2019-03-06
Payer: MEDICAID

## 2019-03-06 VITALS
TEMPERATURE: 98.5 F | BODY MASS INDEX: 48.8 KG/M2 | DIASTOLIC BLOOD PRESSURE: 80 MMHG | OXYGEN SATURATION: 96 % | HEIGHT: 63 IN | SYSTOLIC BLOOD PRESSURE: 104 MMHG | WEIGHT: 275.4 LBS | HEART RATE: 80 BPM

## 2019-03-06 DIAGNOSIS — Z91.89 AT RISK FOR GLAUCOMA: ICD-10-CM

## 2019-03-06 DIAGNOSIS — E66.01 MORBID OBESITY WITH BMI OF 45.0-49.9, ADULT (HCC): ICD-10-CM

## 2019-03-06 DIAGNOSIS — F17.200 TOBACCO DEPENDENCE: ICD-10-CM

## 2019-03-06 PROCEDURE — 99214 OFFICE O/P EST MOD 30 MIN: CPT | Mod: GC | Performed by: INTERNAL MEDICINE

## 2019-03-06 RX ORDER — PROPRANOLOL HYDROCHLORIDE 20 MG/1
TABLET ORAL 2 TIMES DAILY
COMMUNITY
Start: 2019-02-11 | End: 2020-12-04

## 2019-03-06 RX ORDER — RISPERIDONE 3 MG/1
3 TABLET ORAL
COMMUNITY
Start: 2019-02-11 | End: 2019-05-15

## 2019-03-06 NOTE — PROGRESS NOTES
Established Patient    Earnestine presents today with the following:    CC: follow up eye exam     HPI:   Earnestine Knight 24 yo woman with history of polysubstance abuse here for follow up. She was recently seen by an optometrist for myopia where it was noted she had mild glaucoma. She is requesting an opthalmology referral.     Concern for sexually transmitted infections   She was tested for chlamydia, gonorrhea and HIV (reportedly negative) last week at [facility]. I had given her HIV and STD screening tests but she had other tests done at the [facility]. RPR per her chart is negative. No documented tests available to review HIV and chlamydia/gonorrhea results. She denies any vaginal discharge, foul smelling odor, or pruritus. She has not been recently sexually active after being tested (reportedly) negative for HIV. Denies any possibility of pregnancy after a recent test showed negative serum pregnancy. Pt gives permission to obtain results from [facility].       Concern for glaucoma   Denies any headaches, vision changes, vision loss, black spots in vision, but does report tinnitus occasionally in left ear. Listens to loud music with blue tooth speaker in her left ear mostly. Agreeable to turning down loud music in her headphones. Denies any loss of balance, ataxia, presyncope, falls or syncope. No vertigo.       Polysubstance abuse   She is still living at [facility], which is a drug-free rehab facility. She has been sober from drugs for 49 days now and is celebrating that. She continues to smoke tobacco; she is having difficulty quitting tobacco when she is abstaining from street drugs and alcohol.     Patient Active Problem List    Diagnosis Date Noted   • ADHD 01/30/2019   • Bilateral hand numbness 10/30/2018   • Vitamin B12 deficiency 10/30/2018   • Bipolar disorder (HCC) 08/14/2018   • Morbid obesity with BMI of 45.0-49.9, adult ([facility]) 08/14/2018   • Elevated fasting blood sugar  "01/22/2018   • Vitamin D deficiency 01/22/2018   • Psychiatric disorder 12/19/2017   • Encounter for birth control 12/19/2017   • Chronic pain of both ankles 12/19/2017   • SIDS (sudden infant death syndrome) with last baby at 6 weeks of age 07/19/2016   • Tobacco dependence says cut down to 5 cigarettes/day 07/19/2016       Current Outpatient Prescriptions   Medication Sig Dispense Refill   • propranolol (INDERAL) 20 MG Tab      • risperiDONE (RISPERDAL) 3 MG Tab      • cloNIDine (CATAPRES) 0.1 MG Tab Take 1 Tab by mouth 2 times a day. TAKE 1 TABLET BY MOUTH TWICE A DAY (Patient not taking: Reported on 3/6/2019) 60 Tab 0   • propranolol (INDERAL) 10 MG Tab Take 1 Tab by mouth 2 times a day. (Patient not taking: Reported on 3/6/2019) 60 Tab 2   • risperiDONE (RISPERDAL) 1 MG Tab Take 1 Tab by mouth every bedtime. (Patient not taking: Reported on 3/6/2019) 30 Tab 1     No current facility-administered medications for this visit.        ROS: A 12 point ROS has been completed and is negative except as stated above in HPI.     /80 (BP Location: Left arm, Patient Position: Sitting, BP Cuff Size: Large adult)   Pulse 80   Temp 36.9 °C (98.5 °F)   Ht 1.601 m (5' 3.05\")   Wt 124.9 kg (275 lb 6.4 oz)   SpO2 96%   BMI 48.71 kg/m²     Physical Exam   Constitutional:  oriented to person, place, and time. No distress. Pleasant and conversant young woman, in NAD, obese habitus   Eyes: Pupils are equal, round, and reactive to light. No scleral icterus.  Neck: Neck supple. No thyromegaly present.   Cardiovascular: Normal rate, regular rhythm and normal heart sounds.  Exam reveals no gallop and no friction rub.  No murmur heard.  Pulmonary/Chest: Breath sounds normal.    Musculoskeletal:   no edema.   Lymphadenopathy: no cervical adenopathy  Neurological: alert and oriented to person, place, and time.   Skin: No cyanosis. Nails show no clubbing.      Note: I have reviewed all pertinent labs and diagnostic tests " associated with this visit with specific comments listed under the assessment and plan below    Assessment and Plan    1. At risk for glaucoma  - seen by optometry recently for myopia with some concern for glaucoma. She has already scheduled an appointment with [facility] and is requesting a referral so that insurance can pay. Denies any headaches, vision changes, vision loss,   - REFERRAL TO OPHTHALMOLOGY      2. Morbid obesity with BMI of 45.0-49.9, adult (HCC)  - discussed risks of long term obesity including developing of chronic illnesses such as diabetes and heart disease along with increased risks for cancer. She is about to begin an exercise program available at her housing facility. Agrees to cut down on sugary drinks and sodas.   - printed out referral to health improvement program       3. Tobacco dependence   - cut down significantly but ongoing smoking   - discuss smoking cessation at every visit       4. Healthcare maintenance   - will request STD blood tests from [facility] (pt gave permission)   - asymptomatic, not sexually active. No symptoms suggestive of trichomonas. Defer testing.       5. Vitamin B12 deficiency   - discussed supplementing with over the counter vitamin B12 for borderline deficiency. Repeat labs w/ next blood work     Followup: Return in about 5 weeks (around 4/10/2019).  Discuss smoking cessation, weight loss, review labs from [facility], order yearly labs      Signed by: Belkys Orellana M.D.

## 2019-03-06 NOTE — PATIENT INSTRUCTIONS
"Vitamin B12 over the counter medication   Cut down on soda and candy. Drink more water on a daily basis. Cut down on sweets and sugary products as well as fried foods.   Great job on initiating your exercise program. Consider walking more   Go to the sleep studies appointment   Referral to the eye doctor given   Your weight loss goal for the next month as stated by you is \"8 lbs in one month\"   "

## 2019-03-07 RX ORDER — IBUPROFEN 800 MG/1
800 TABLET ORAL
COMMUNITY
Start: 2019-02-05 | End: 2020-12-04

## 2019-04-10 ENCOUNTER — OFFICE VISIT (OUTPATIENT)
Dept: INTERNAL MEDICINE | Facility: MEDICAL CENTER | Age: 24
End: 2019-04-10
Payer: MEDICAID

## 2019-04-10 VITALS
BODY MASS INDEX: 51.91 KG/M2 | DIASTOLIC BLOOD PRESSURE: 84 MMHG | OXYGEN SATURATION: 98 % | TEMPERATURE: 97.5 F | SYSTOLIC BLOOD PRESSURE: 110 MMHG | HEIGHT: 63 IN | HEART RATE: 78 BPM | WEIGHT: 293 LBS

## 2019-04-10 DIAGNOSIS — Z91.89 AT RISK FOR SEXUALLY TRANSMITTED DISEASE DUE TO UNPROTECTED SEX: ICD-10-CM

## 2019-04-10 DIAGNOSIS — E66.01 MORBID OBESITY WITH BMI OF 50.0-59.9, ADULT (HCC): ICD-10-CM

## 2019-04-10 DIAGNOSIS — G44.219 EPISODIC TENSION-TYPE HEADACHE, NOT INTRACTABLE: ICD-10-CM

## 2019-04-10 DIAGNOSIS — R63.5 WEIGHT GAIN: ICD-10-CM

## 2019-04-10 DIAGNOSIS — Z00.00 HEALTHCARE MAINTENANCE: ICD-10-CM

## 2019-04-10 DIAGNOSIS — N92.6 MISSED PERIODS: ICD-10-CM

## 2019-04-10 DIAGNOSIS — N64.3 GALACTORRHEA NOT ASSOCIATED WITH CHILDBIRTH: ICD-10-CM

## 2019-04-10 DIAGNOSIS — T74.91XD DOMESTIC VIOLENCE OF ADULT, SUBSEQUENT ENCOUNTER: ICD-10-CM

## 2019-04-10 DIAGNOSIS — Z87.828 HISTORY OF TRAUMATIC HEAD INJURY: ICD-10-CM

## 2019-04-10 DIAGNOSIS — F17.200 TOBACCO DEPENDENCE: ICD-10-CM

## 2019-04-10 LAB
INT CON NEG: NORMAL
INT CON POS: NORMAL
POC URINE PREGNANCY TEST: NORMAL

## 2019-04-10 PROCEDURE — 99214 OFFICE O/P EST MOD 30 MIN: CPT | Mod: GC | Performed by: INTERNAL MEDICINE

## 2019-04-10 PROCEDURE — 81025 URINE PREGNANCY TEST: CPT | Mod: GC | Performed by: INTERNAL MEDICINE
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
## 2019-04-10 NOTE — PATIENT INSTRUCTIONS
Obtain blood work prior to your next appointment         Bariatric Surgery Information  Bariatric surgery, also called weight loss surgery, is a procedure that helps you lose weight. You may consider or your health care provider may suggest bariatric surgery if:  · You are severely obese and have been unable to lose weight through diet and exercise.  · You have health problems related to obesity, such as:  ¨ Type 2 diabetes.  ¨ Heart disease.  ¨ Lung disease.  How does bariatric surgery help me lose weight?  Bariatric surgery helps you lose weight by decreasing how much food your body absorbs. This is done by closing off part of your stomach to make it smaller. This restricts the amount of food your stomach can hold. Bariatric surgery can also change your body’s regular digestive process, so that food bypasses the parts of your body that absorb calories and nutrients.  If you decide to have bariatric surgery, it is important to continue to eat a healthy diet and exercise regularly after the surgery.  What are the different kinds of bariatric surgery?  There are two kinds of bariatric surgeries:  · Restrictive surgeries make your stomach smaller. They do not change your digestive process. The smaller the size of your new stomach, the less food you can eat. There are different types of restrictive surgeries.  · Malabsorptive surgeries both make your stomach smaller and alter your digestive process so that your body processes less calories and nutrients. These are the most common kind of bariatric surgery. There are different types of malabsorptive surgeries.  What are the different types of restrictive surgery?  Adjustable Gastric Banding   In this procedure, an inflatable band is placed around your stomach near the upper end. This makes the passageway for food into the rest of your stomach much smaller. The band can be adjusted, making it tighter or looser, by filling it with salt solution. Your surgeon can adjust  the band based on how are you feeling and how much weight you are losing. The band can be removed in the future.  Vertical Banded Gastroplasty   In this procedure, staples are used to separate your stomach into two parts, a small upper pouch and a bigger lower pouch. This decreases how much food you can eat.  Sleeve Gastrectomy   In this procedure, your stomach is made smaller. This is done by surgically removing a large part of your stomach. When your stomach is smaller, you feel full more quickly and reduce how much you eat.  What are the different types of malabsorptive surgery?  Cristo-en-Y Gastric Bypass (RGB)   This is the most common weight loss surgery. In this procedure, a small stomach pouch is created in the upper part of your stomach. Next, this small stomach pouch is attached directly to the middle part of your small intestine. The farther down your small intestine the new connection is made, the fewer calories and nutrients you will absorb.  Biliopancreatic Diversion with Duodenal Switch (BPD/DS)   This is a multi-step procedure. In this procedure, a large part of your stomach is removed, making your stomach smaller. Next, this smaller stomach is attached to the lower part of your small intestine. Like the RGB surgery, you absorb fewer calories and nutrients the farther down your small intestine the attachment is made.  What are the risks of bariatric surgery?  As with any surgical procedure, each type of bariatric surgery has its own risks. These risks also depend on your age, your overall health, and any other medical conditions you may have. When deciding on bariatric surgery, it is very important to:  · Talk to your health care provider and choose the surgery that is best for you.  · Ask your health care provider about specific risks for the surgery you choose.  Where to find more information:  · American Society for Metabolic & Bariatric Surgery: www.asmbs.org  · Weight-control Information Network  (WIN): lennox.niddk.nih.gov  This information is not intended to replace advice given to you by your health care provider. Make sure you discuss any questions you have with your health care provider.  Document Released: 12/18/2006 Document Revised: 05/25/2017 Document Reviewed: 06/18/2014  Elsevier Interactive Patient Education © 2017 [employer]

## 2019-04-11 PROBLEM — T74.91XA ADULT ABUSE, DOMESTIC: Status: ACTIVE | Noted: 2019-04-11

## 2019-04-18 PROBLEM — H47.399: Status: ACTIVE | Noted: 2019-04-18

## 2019-05-06 ENCOUNTER — HOSPITAL ENCOUNTER (OUTPATIENT)
Dept: RADIOLOGY | Facility: MEDICAL CENTER | Age: 24
End: 2019-05-06
Attending: HOSPITALIST
Payer: MEDICAID

## 2019-05-06 DIAGNOSIS — G44.219 EPISODIC TENSION-TYPE HEADACHE, NOT INTRACTABLE: ICD-10-CM

## 2019-05-06 PROCEDURE — 70450 CT HEAD/BRAIN W/O DYE: CPT

## 2019-05-15 ENCOUNTER — OFFICE VISIT (OUTPATIENT)
Dept: INTERNAL MEDICINE | Facility: MEDICAL CENTER | Age: 24
End: 2019-05-15
Payer: MEDICAID

## 2019-05-15 VITALS
HEIGHT: 63 IN | SYSTOLIC BLOOD PRESSURE: 100 MMHG | TEMPERATURE: 96.8 F | HEART RATE: 68 BPM | WEIGHT: 293 LBS | OXYGEN SATURATION: 96 % | DIASTOLIC BLOOD PRESSURE: 70 MMHG | BODY MASS INDEX: 51.91 KG/M2

## 2019-05-15 DIAGNOSIS — E66.01 MORBID OBESITY WITH BMI OF 50.0-59.9, ADULT (HCC): ICD-10-CM

## 2019-05-15 DIAGNOSIS — Z72.51 UNPROTECTED SEXUAL INTERCOURSE: ICD-10-CM

## 2019-05-15 DIAGNOSIS — R73.01 IMPAIRED FASTING GLUCOSE: ICD-10-CM

## 2019-05-15 DIAGNOSIS — F17.200 TOBACCO DEPENDENCE: ICD-10-CM

## 2019-05-15 DIAGNOSIS — N91.4 SECONDARY OLIGOMENORRHEA: ICD-10-CM

## 2019-05-15 DIAGNOSIS — F31.62 BIPOLAR DISORDER, CURRENT EPISODE MIXED, MODERATE (HCC): ICD-10-CM

## 2019-05-15 DIAGNOSIS — E53.8 VITAMIN B12 DEFICIENCY: ICD-10-CM

## 2019-05-15 PROBLEM — M25.571 CHRONIC PAIN OF BOTH ANKLES: Status: RESOLVED | Noted: 2017-12-19 | Resolved: 2019-05-15

## 2019-05-15 PROBLEM — R20.0 BILATERAL HAND NUMBNESS: Status: RESOLVED | Noted: 2018-10-30 | Resolved: 2019-05-15

## 2019-05-15 PROBLEM — G89.29 CHRONIC PAIN OF BOTH ANKLES: Status: RESOLVED | Noted: 2017-12-19 | Resolved: 2019-05-15

## 2019-05-15 PROBLEM — M25.572 CHRONIC PAIN OF BOTH ANKLES: Status: RESOLVED | Noted: 2017-12-19 | Resolved: 2019-05-15

## 2019-05-15 LAB
INT CON NEG: NORMAL
INT CON POS: NORMAL
POC URINE PREGNANCY TEST: NEGATIVE

## 2019-05-15 PROCEDURE — 81025 URINE PREGNANCY TEST: CPT | Mod: GC | Performed by: INTERNAL MEDICINE

## 2019-05-15 PROCEDURE — 99213 OFFICE O/P EST LOW 20 MIN: CPT | Mod: GE | Performed by: INTERNAL MEDICINE

## 2019-05-15 RX ORDER — FLUOXETINE 10 MG/1
10 CAPSULE ORAL DAILY
Refills: 1 | COMMUNITY
Start: 2019-05-09 | End: 2020-12-04

## 2019-05-15 RX ORDER — ZIPRASIDONE HYDROCHLORIDE 40 MG/1
40 CAPSULE ORAL
Refills: 1 | COMMUNITY
Start: 2019-05-09 | End: 2020-12-04
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
## 2019-06-03 ENCOUNTER — HOSPITAL ENCOUNTER (EMERGENCY)
Dept: HOSPITAL 8 - ED | Age: 24
Discharge: LEFT BEFORE DISCHARGE | End: 2019-06-03
Payer: MEDICAID

## 2019-06-03 VITALS — BODY MASS INDEX: 51.41 KG/M2 | HEIGHT: 63 IN | WEIGHT: 290.13 LBS

## 2019-06-03 VITALS — SYSTOLIC BLOOD PRESSURE: 147 MMHG | DIASTOLIC BLOOD PRESSURE: 70 MMHG

## 2019-06-03 DIAGNOSIS — R00.0: ICD-10-CM

## 2019-06-03 DIAGNOSIS — F10.10: Primary | ICD-10-CM
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
## 2019-06-03 PROCEDURE — 99283 EMERGENCY DEPT VISIT LOW MDM: CPT

## 2019-06-03 NOTE — NUR
TRIAGE RN:

PT IS CALM AND COOPERATIVE AND FOLLOWING COMMANDS. PT IS JITTERY BUT REPORTS 
THAT HER METH HIGH IS GONE AND SHE WANTS HELP. PT PLACED IN LOBBY.

## 2019-07-01 ENCOUNTER — HOSPITAL ENCOUNTER (EMERGENCY)
Dept: HOSPITAL 8 - ED | Age: 24
Discharge: HOME | End: 2019-07-01
Payer: MEDICAID

## 2019-07-01 VITALS — SYSTOLIC BLOOD PRESSURE: 157 MMHG | DIASTOLIC BLOOD PRESSURE: 106 MMHG

## 2019-07-01 VITALS — HEIGHT: 63 IN | WEIGHT: 270.07 LBS | BODY MASS INDEX: 47.85 KG/M2

## 2019-07-01 DIAGNOSIS — L03.311: ICD-10-CM

## 2019-07-01 DIAGNOSIS — N30.00: Primary | ICD-10-CM

## 2019-07-01 DIAGNOSIS — F15.10: ICD-10-CM

## 2019-07-01 DIAGNOSIS — F41.9: ICD-10-CM

## 2019-07-01 LAB
ALBUMIN SERPL-MCNC: 3.7 G/DL (ref 3.4–5)
ALP SERPL-CCNC: 84 U/L (ref 45–117)
ALT SERPL-CCNC: 60 U/L (ref 12–78)
ANION GAP SERPL CALC-SCNC: 8 MMOL/L (ref 5–15)
BASOPHILS # BLD AUTO: 0.04 X10^3/UL (ref 0–0.1)
BASOPHILS NFR BLD AUTO: 0 % (ref 0–1)
BILIRUB SERPL-MCNC: 1.5 MG/DL (ref 0.2–1)
CALCIUM SERPL-MCNC: 9.3 MG/DL (ref 8.5–10.1)
CHLORIDE SERPL-SCNC: 105 MMOL/L (ref 98–107)
CREAT SERPL-MCNC: 0.93 MG/DL (ref 0.55–1.02)
CULTURE INDICATED?: YES
EOSINOPHIL # BLD AUTO: 0.02 X10^3/UL (ref 0–0.4)
EOSINOPHIL NFR BLD AUTO: 0 % (ref 1–7)
ERYTHROCYTE [DISTWIDTH] IN BLOOD BY AUTOMATED COUNT: 13.2 % (ref 9.6–15.2)
LYMPHOCYTES # BLD AUTO: 1.25 X10^3/UL (ref 1–3.4)
LYMPHOCYTES NFR BLD AUTO: 11 % (ref 22–44)
MCH RBC QN AUTO: 30.1 PG (ref 27–34.8)
MCHC RBC AUTO-ENTMCNC: 34.1 G/DL (ref 32.4–35.8)
MCV RBC AUTO: 88.2 FL (ref 80–100)
MD: NO
MICROSCOPIC: (no result)
MONOCYTES # BLD AUTO: 0.63 X10^3/UL (ref 0.2–0.8)
MONOCYTES NFR BLD AUTO: 6 % (ref 2–9)
NEUTROPHILS # BLD AUTO: 9.09 X10^3/UL (ref 1.8–6.8)
NEUTROPHILS NFR BLD AUTO: 83 % (ref 42–75)
PLATELET # BLD AUTO: 232 X10^3/UL (ref 130–400)
PMV BLD AUTO: 8.4 FL (ref 7.4–10.4)
PROT SERPL-MCNC: 7.3 G/DL (ref 6.4–8.2)
RBC # BLD AUTO: 5.33 X10^6/UL (ref 3.82–5.3)

## 2019-07-01 PROCEDURE — 99283 EMERGENCY DEPT VISIT LOW MDM: CPT

## 2019-07-01 PROCEDURE — 84703 CHORIONIC GONADOTROPIN ASSAY: CPT

## 2019-07-01 PROCEDURE — 85025 COMPLETE CBC W/AUTO DIFF WBC: CPT

## 2019-07-01 PROCEDURE — 36415 COLL VENOUS BLD VENIPUNCTURE: CPT

## 2019-07-01 PROCEDURE — 80053 COMPREHEN METABOLIC PANEL: CPT

## 2019-07-01 PROCEDURE — 80307 DRUG TEST PRSMV CHEM ANLYZR: CPT

## 2019-07-01 PROCEDURE — 87086 URINE CULTURE/COLONY COUNT: CPT

## 2019-07-01 PROCEDURE — 96365 THER/PROPH/DIAG IV INF INIT: CPT

## 2019-07-01 PROCEDURE — 81001 URINALYSIS AUTO W/SCOPE: CPT

## 2019-07-01 NOTE — NUR
PT AMBULATED STEADILY TO BATHROOM TO PROVIDE UA. UA COLLECTED AND SENT TO LAB. 
PT REPORTS 10/10 R CHEST PAIN, INTERMITTENT. ERP AWARE. NO NEW ORDERS RECEIVED.

## 2019-07-01 NOTE — NUR
FIRST CONTACT WITH PT. PT RESTING IN GURNEY W/ EYES CLOSED. EVEN/REGULAR 
RESPIRATIONS NOTED. PT ARROUSABLE TO VOICE. UPON ARROUSAL, PT CO 'TERRIBLE 
PAIN'. 



BP/SPO2/ECG MONITORING IN PLACE. NSR ON MONITOR. NO S/S OF ABX RXN NOTED.

## 2019-07-01 NOTE — NUR
PT DRESSED IN GOWN. LAB AT BEDSIDE. PT C/O "INFECTION IN MY STOMACH. IT'S 
PROBABLY FROM THE PICKING. I HAVEN'T BEEN TAKING MY MEDS." PT ATTACHED TO 
MONITOR. PT VERY RESTLESS IN BED, REPEATEDLY ASKING FOR A SANDWICH AND PAIN 
MEDS. STATES RECENT METH USE BUT REFUSING TO TELL STAFF THE DATE.

## 2019-09-09 ENCOUNTER — HOSPITAL ENCOUNTER (EMERGENCY)
Facility: MEDICAL CENTER | Age: 24
End: 2019-09-09
Attending: EMERGENCY MEDICINE
Payer: MEDICAID

## 2019-09-09 ENCOUNTER — APPOINTMENT (OUTPATIENT)
Dept: RADIOLOGY | Facility: MEDICAL CENTER | Age: 24
End: 2019-09-09
Attending: EMERGENCY MEDICINE
Payer: MEDICAID

## 2019-09-09 VITALS
TEMPERATURE: 97.8 F | HEART RATE: 70 BPM | SYSTOLIC BLOOD PRESSURE: 137 MMHG | BODY MASS INDEX: 44.96 KG/M2 | RESPIRATION RATE: 16 BRPM | DIASTOLIC BLOOD PRESSURE: 95 MMHG | WEIGHT: 253.75 LBS | OXYGEN SATURATION: 99 % | HEIGHT: 63 IN

## 2019-09-09 DIAGNOSIS — N30.00 ACUTE CYSTITIS WITHOUT HEMATURIA: ICD-10-CM

## 2019-09-09 LAB
APPEARANCE UR: ABNORMAL
B-HCG SERPL-ACNC: <0.6 MIU/ML (ref 0–5)
BACTERIA #/AREA URNS HPF: ABNORMAL /HPF
BILIRUB UR QL STRIP.AUTO: NEGATIVE
COLOR UR: YELLOW
EPI CELLS #/AREA URNS HPF: ABNORMAL /HPF
GLUCOSE UR STRIP.AUTO-MCNC: NEGATIVE MG/DL
HCG UR QL: NEGATIVE
HYALINE CASTS #/AREA URNS LPF: ABNORMAL /LPF
KETONES UR STRIP.AUTO-MCNC: NEGATIVE MG/DL
LEUKOCYTE ESTERASE UR QL STRIP.AUTO: ABNORMAL
MICRO URNS: ABNORMAL
NITRITE UR QL STRIP.AUTO: NEGATIVE
NUMBER OF RH DOSES IND 8505RD: NORMAL
PH UR STRIP.AUTO: 5.5 [PH] (ref 5–8)
PROT UR QL STRIP: NEGATIVE MG/DL
RBC # URNS HPF: ABNORMAL /HPF
RBC UR QL AUTO: NEGATIVE
RH BLD: NORMAL
SP GR UR STRIP.AUTO: 1.02
TRICHOMONAS #/AREA URNS HPF: ABNORMAL /HPF
UROBILINOGEN UR STRIP.AUTO-MCNC: 0.2 MG/DL
WBC #/AREA URNS HPF: ABNORMAL /HPF

## 2019-09-09 PROCEDURE — 86901 BLOOD TYPING SEROLOGIC RH(D): CPT

## 2019-09-09 PROCEDURE — 76856 US EXAM PELVIC COMPLETE: CPT

## 2019-09-09 PROCEDURE — 700102 HCHG RX REV CODE 250 W/ 637 OVERRIDE(OP): Performed by: EMERGENCY MEDICINE

## 2019-09-09 PROCEDURE — A9270 NON-COVERED ITEM OR SERVICE: HCPCS | Performed by: EMERGENCY MEDICINE

## 2019-09-09 PROCEDURE — 81001 URINALYSIS AUTO W/SCOPE: CPT

## 2019-09-09 PROCEDURE — 87086 URINE CULTURE/COLONY COUNT: CPT

## 2019-09-09 PROCEDURE — 84702 CHORIONIC GONADOTROPIN TEST: CPT

## 2019-09-09 PROCEDURE — 99284 EMERGENCY DEPT VISIT MOD MDM: CPT

## 2019-09-09 PROCEDURE — 81025 URINE PREGNANCY TEST: CPT

## 2019-09-09 PROCEDURE — 36415 COLL VENOUS BLD VENIPUNCTURE: CPT

## 2019-09-09 RX ORDER — SULFAMETHOXAZOLE AND TRIMETHOPRIM 800; 160 MG/1; MG/1
1 TABLET ORAL EVERY 12 HOURS
Qty: 10 TAB | Refills: 0 | Status: SHIPPED | OUTPATIENT
Start: 2019-09-09 | End: 2019-09-14

## 2019-09-09 RX ORDER — SULFAMETHOXAZOLE AND TRIMETHOPRIM 800; 160 MG/1; MG/1
1 TABLET ORAL ONCE
Status: COMPLETED | OUTPATIENT
Start: 2019-09-09 | End: 2019-09-09

## 2019-09-09 RX ADMIN — SULFAMETHOXAZOLE AND TRIMETHOPRIM 1 TABLET: 800; 160 TABLET ORAL at 12:31

## 2019-09-09 ASSESSMENT — LIFESTYLE VARIABLES
DOES PATIENT WANT TO STOP DRINKING: NO
DO YOU DRINK ALCOHOL: NO

## 2019-09-09 NOTE — ED TRIAGE NOTES
".  Chief Complaint   Patient presents with   • Pregnancy Test     took three home pregnancy test x 3 at home and \"the line was really faint so I just wanted to make sure\"     ./105   Pulse (!) 102   Temp 36.8 °C (98.2 °F) (Oral)   Resp 16   Ht 1.6 m (5' 3\")   Wt 115.1 kg (253 lb 12 oz)   SpO2 99%   BMI 44.95 kg/m²     Ambulatory to triage for above, reports irregular periods, educated on triage process, placed in lobby, told to inform staff of any changes in condition.    "

## 2019-09-09 NOTE — ED PROVIDER NOTES
"ED Provider Note    ED Provider Note      Primary care provider: Belkys Orellana M.D.    CHIEF COMPLAINT  Chief Complaint   Patient presents with   • Pregnancy Test     took three home pregnancy test x 3 at home and \"the line was really faint so I just wanted to make sure\"       HPI  Earnestine Knight is a 24 y.o. female who presents to the Emergency Department with chief complaint of possible pregnancy.  Patient cannot recall her last.  She states that her menstrual cycles have been extremely irregular since she discontinued Depo-Provera.  She reports that she is had some slight suprapubic and right lower quadrant stabbing type pain somewhat radiates into her right flank she is also had some minimal spotting over the last several days.  She reports an increase in what is her normal appearing discharge she denies any foul odor vaginal itching or abnormal discharge.  She denies any urinary complaints no headache altered mental status cough congestion chest pain shortness of breath no other acute symptoms or concerns.  She is a G5, P4 status post 4 prior  sections.    REVIEW OF SYSTEMS  10 systems reviewed and otherwise negative, pertinent positives and negatives listed in the history of present illness.    PAST MEDICAL HISTORY   has a past medical history of Anxiety, Anxiety disorder, Bipolar disorder (HCC), Depression, History of  x 3 needs another csections (2016), Hypertension (), Migraine, PTSD (post-traumatic stress disorder), SIDS (sudden infant death syndrome) with last baby at 6 weeks of age (2016), and Tobacco dependence says cut down to 5 cigarettes/day (2016).    SURGICAL HISTORY   has a past surgical history that includes primary c section (); repeat c section (); repeat c section (11/3/2015); and repeat c section (2017).    SOCIAL HISTORY  Social History     Tobacco Use   • Smoking status: Current Every Day Smoker     Packs/day: 0.25     Years: 9.00 " "    Pack years: 2.25     Types: Cigarettes   • Smokeless tobacco: Never Used   • Tobacco comment: Pt states smokin 1/2 pack a day.   Substance Use Topics   • Alcohol use: No   • Drug use: No      Social History     Substance and Sexual Activity   Drug Use No       FAMILY HISTORY  Non-Contributory    CURRENT MEDICATIONS  Home Medications    **Home medications have not yet been reviewed for this encounter**         ALLERGIES  Allergies   Allergen Reactions   • Strattera [Atomoxetine] Shortness of Breath       PHYSICAL EXAM  VITAL SIGNS: /105   Pulse (!) 102   Temp 36.8 °C (98.2 °F) (Oral)   Resp 16   Ht 1.6 m (5' 3\")   Wt 115.1 kg (253 lb 12 oz)   SpO2 99%   BMI 44.95 kg/m²   Pulse ox interpretation: I interpret this pulse ox as normal.  Constitutional: Alert and oriented x 3, no acute distress  HEENT: Atraumatic normocephalic, pupils are equal round reactive to light extraocular movements are intact. The nares is clear, external ears are normal, mouth shows moist mucous membranes  Neck: Supple, no JVD no tracheal deviation  Cardiovascular: Borderline tachycardic no murmur rub or gallop 2+ pulses peripherally x4  Thorax & Lungs: No respiratory distress, no wheezes rales or rhonchi, No chest tenderness.   GI: Soft nontender nondistended positive bowel sounds, no peritoneal signs  Skin: Warm dry no acute rash or lesion  Musculoskeletal: Moving all extremities with full range and 5 of 5 strength, no acute  deformity  Neurologic: Cranial nerves III through XII are grossly intact, no sensory deficit, no cerebellar dysfunction   Psychiatric: Appropriate affect for situation at this time      DIAGNOSTIC STUDIES / PROCEDURES  LABS    Results for orders placed or performed in visit on 05/15/19   POCT Pregnancy   Result Value Ref Range    POC Urine Pregnancy Test Negative Negative    Internal Control Positive Valid     Internal Control Negative Valid        All labs reviewed by me.      RADIOLOGY  US-PELVIC " "COMPLETE (TRANSABDOMINAL/TRANSVAGINAL) (COMBO)   Final Result      No IUP identified. Recommend interval follow-up to confirm viability and exclude potential ectopic pregnancy          We will proceed with beta-hCG Rh and formal transvaginal ultrasound.    The radiologist's interpretation of all radiological studies have been reviewed by me.    COURSE & MEDICAL DECISION MAKING  Pertinent Labs & Imaging studies reviewed. (See chart for details)    10:34 AM - Patient seen and examined at bedside.       Patient noted to have slightly elevated blood pressure likely circumstantial secondary to presenting complaint. Referred to primary care physician for further evaluation.      Medical Decision Making: Ultrasound as above beta-hCG undetectable Rh+ return for worsening pain heavier bleeding discharged any further abdominal pain any other acute symptoms or concerns otherwise follow-up with primary care.  /105   Pulse (!) 102   Temp 36.8 °C (98.2 °F) (Oral)   Resp 16   Ht 1.6 m (5' 3\")   Wt 115.1 kg (253 lb 12 oz)   SpO2 99%   BMI 44.95 kg/m²             FINAL IMPRESSION  1. Acute cystitis without hematuria    2.  Vaginal bleeding  3.  Possible spontaneous  and very early stages.    This dictation has been created using voice recognition software and/or scribes. The accuracy of the dictation is limited by the abilities of the software and the expertise of the scribes. I expect there may be some errors of grammar and possibly content. I made every attempt to manually correct the errors within my dictation. However, errors related to voice recognition software and/or scribes may still exist and should be interpreted within the appropriate context.            "

## 2019-09-11 LAB
BACTERIA UR CULT: NORMAL
SIGNIFICANT IND 70042: NORMAL
SITE SITE: NORMAL
SOURCE SOURCE: NORMAL

## 2020-12-03 ENCOUNTER — HOSPITAL ENCOUNTER (EMERGENCY)
Facility: MEDICAL CENTER | Age: 25
End: 2020-12-04
Attending: EMERGENCY MEDICINE
Payer: MEDICAID

## 2020-12-03 DIAGNOSIS — R45.851 SUICIDAL IDEATION: ICD-10-CM

## 2020-12-03 DIAGNOSIS — F32.A DEPRESSION, UNSPECIFIED DEPRESSION TYPE: ICD-10-CM

## 2020-12-03 LAB — POC BREATHALIZER: 0 PERCENT (ref 0–0.01)

## 2020-12-03 PROCEDURE — 99285 EMERGENCY DEPT VISIT HI MDM: CPT

## 2020-12-03 PROCEDURE — 302970 POC BREATHALIZER: Performed by: EMERGENCY MEDICINE

## 2020-12-03 RX ORDER — LORAZEPAM 2 MG/ML
1 INJECTION INTRAMUSCULAR ONCE
Status: DISCONTINUED | OUTPATIENT
Start: 2020-12-03 | End: 2020-12-04 | Stop reason: HOSPADM

## 2020-12-03 RX ORDER — HALOPERIDOL 5 MG/ML
5 INJECTION INTRAMUSCULAR ONCE
Status: DISCONTINUED | OUTPATIENT
Start: 2020-12-03 | End: 2020-12-04 | Stop reason: HOSPADM

## 2020-12-03 ASSESSMENT — LIFESTYLE VARIABLES
TOTAL SCORE: 4
CONSUMPTION TOTAL: INCOMPLETE
EVER HAD A DRINK FIRST THING IN THE MORNING TO STEADY YOUR NERVES TO GET RID OF A HANGOVER: YES
DO YOU DRINK ALCOHOL: YES
TOTAL SCORE: 4
TOTAL SCORE: 4
DOES PATIENT WANT TO STOP DRINKING: NO
HAVE PEOPLE ANNOYED YOU BY CRITICIZING YOUR DRINKING: YES
HAVE YOU EVER FELT YOU SHOULD CUT DOWN ON YOUR DRINKING: YES
EVER FELT BAD OR GUILTY ABOUT YOUR DRINKING: YES

## 2020-12-04 VITALS
RESPIRATION RATE: 16 BRPM | HEIGHT: 63 IN | WEIGHT: 253.75 LBS | DIASTOLIC BLOOD PRESSURE: 91 MMHG | SYSTOLIC BLOOD PRESSURE: 131 MMHG | HEART RATE: 89 BPM | OXYGEN SATURATION: 93 % | BODY MASS INDEX: 44.96 KG/M2 | TEMPERATURE: 98.7 F

## 2020-12-04 LAB
AMPHET UR QL SCN: POSITIVE
BARBITURATES UR QL SCN: NEGATIVE
BENZODIAZ UR QL SCN: POSITIVE
BZE UR QL SCN: NEGATIVE
CANNABINOIDS UR QL SCN: NEGATIVE
METHADONE UR QL SCN: NEGATIVE
OPIATES UR QL SCN: NEGATIVE
OXYCODONE UR QL SCN: NEGATIVE
PCP UR QL SCN: NEGATIVE
PROPOXYPH UR QL SCN: NEGATIVE

## 2020-12-04 PROCEDURE — 80307 DRUG TEST PRSMV CHEM ANLYZR: CPT

## 2020-12-04 PROCEDURE — 90791 PSYCH DIAGNOSTIC EVALUATION: CPT

## 2020-12-04 NOTE — ED NOTES
Report received from Tara FISHER. Pt resting comfortably in bed. Breathalyzer 0.00. Vitals repeated. Pt refusing to urinate. Sitter remains outside patients room for safety.

## 2020-12-04 NOTE — ED NOTES
[facility] called for report, plan to take patient to [facility] at 10am.   Pt continues to be unable to give urine sample and refusing to try. Pt given another glass of water. Will attempt to obtain urine prior to transfer.

## 2020-12-04 NOTE — CONSULTS
PSYCHIATRY CONSULT TEAM NOTE: Consult received. Patient has been accepted to [facility] and will be transported there around 09:30. Consult canceled.     Thank you.

## 2020-12-04 NOTE — ED NOTES
Pt's clothing removed by female RN. Security called to search belongings.   Vitals repeated and charted. Sitter remains outside patients room for safety. Pt no longer endorsing suicidal thoughts but does endorse depression and cutting her wrists three days ago.   Pt continues to refuse to provide urine, stating she can't void at this time.
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
## 2020-12-04 NOTE — DISCHARGE PLANNING
Medical Social Work    Referral: Legal Hold    Intervention: Legal Hold Paperwork given to SW by [employer] RN Blanca    Legal Hold Initiated: Date: 10/3/2020 Time: 1615    Patient’s Insurance Listed on Face Sheet: Medicaid HMO     Referrals sent to: [facility], [facility], [facility], [facility].    This referral contains the following information:  1) Face sheet __x__  2) Page 1 and Page 2 of Legal Hold __x__  3) Alert Team Assessment/Psych Assessment __x__  4) Head to toe physical exam __x__  5) Urine Drug Screen __x__  6) Blood Alcohol __x__  7) Vital signs __x__  8) Pregnancy test when applicable _x__  9) Medications list __x__    Plan: Patient will transfer to mental health facility once acceptance is obtained

## 2020-12-04 NOTE — DISCHARGE PLANNING
Medical Social Work    Referral: Legal hold Transfer to Mental Health Facility    Intervention: MERLY received call from Sheila at [facility] stating that they have accepted the patient for admission.     Pt's accepting physcian is Dr. Nicolasa MORELAND arranged for transportation to be set up through [facility]    The pt will be picked up at 0930.     MERLY notified the RN of the departure time as well as accepting facility.     MERLY created transfer packet and placed on chart.     Plan: Pt will transfer back to [facility] at 0930  .
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
## 2020-12-04 NOTE — ED NOTES
Pt sitting up in the bed, starting to wake up and become more alert. ALERT team RN Jeni to bedside to assess patient.

## 2020-12-04 NOTE — ED NOTES
Break RN: Pt appears to be sleeping w/ even chest rise and fall. No needs at this time. Will continue to monitor.

## 2020-12-04 NOTE — ED NOTES
Pt resting comfortably in [facility]. Pt appears asleep at this time. Respirations even and unlabored. Pt  at bedside.

## 2020-12-04 NOTE — ED NOTES
Report received from NATALIA Keller. POC discussed. Patient resting comfortably in bed.     Patient encouraged to provide UA.     Legal hold in place in chart. 1:1 sitter in place.

## 2020-12-04 NOTE — ED NOTES
Patient discharged to [facility]. Set up by social work and behavioral health.   Discharge teaching and education discussed with patient. POC discussed.   Patient verbalized understanding of discharge teaching and education. No other questions at this time.     VSS. Patient alert and oriented. Able to ambulate off unit safely with steady gait. Belongings (three bags) removed from [facility] and given to EMS to take to [facility] with patient.
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
## 2020-12-04 NOTE — ED PROVIDER NOTES
7:48 AM    Patient rechecked.  Unfortunate 25-year-old female on legal hold for acute psychosis and suicidal ideation.  No medical complaints.  No leg swelling.  Vital signs stable.  Resting comfortably.  We will continue to closely monitor under continuous observation until psychiatric hospital placement may be established.    Electronically signed by Ifeanyi Skinner M.D. on 12/4/2020 at 7:48 AM.

## 2020-12-04 NOTE — ED NOTES
Med Rec completed per patient   Allergies reviewed  No ORAL antibiotics in last 14 days    Patient states that she is not currently taking any daily medications

## 2020-12-04 NOTE — ED NOTES
Patient resting comfortably. Respirations even and unlabored.     1:1 sitter continues to be in place.

## 2020-12-04 NOTE — ED NOTES
Pt continues to rest in [facility], within view of safety sitter. Pt's belongings bagged and removed from room, facesheet placed in belongings bag. Vitals repeated. Will continue to hourly round.
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
## 2020-12-04 NOTE — ED NOTES
Pt resting quietly in room with lights turned down. Pt lying left side, appears asleep at this time. Respirations even and unlabored. Pt  at bedside.

## 2020-12-04 NOTE — ED NOTES
Pt sleeping appears to be resting comfortably, even/unlabored respirations present. Sitter remains outside room for safety.

## 2020-12-04 NOTE — ED NOTES
Pt resting quietly in room with lights turned down. Pt lying on right side, appears asleep at this time.  Respirations even and unlabored.  Pt safety sitter outside room for 1:1 obs.

## 2020-12-04 NOTE — ED NOTES
Pt continues to rest in gurney with even/unlabored respirations. Pt remains within view of safety sitter. Will continue to round.

## 2020-12-23 ENCOUNTER — APPOINTMENT (OUTPATIENT)
Dept: RADIOLOGY | Facility: MEDICAL CENTER | Age: 25
End: 2020-12-23
Attending: EMERGENCY MEDICINE
Payer: MEDICAID

## 2020-12-23 ENCOUNTER — HOSPITAL ENCOUNTER (EMERGENCY)
Facility: MEDICAL CENTER | Age: 25
End: 2020-12-23
Attending: EMERGENCY MEDICINE
Payer: MEDICAID

## 2020-12-23 VITALS
OXYGEN SATURATION: 99 % | SYSTOLIC BLOOD PRESSURE: 123 MMHG | TEMPERATURE: 96.7 F | BODY MASS INDEX: 49.14 KG/M2 | WEIGHT: 277.34 LBS | RESPIRATION RATE: 20 BRPM | HEIGHT: 63 IN | DIASTOLIC BLOOD PRESSURE: 73 MMHG | HEART RATE: 95 BPM

## 2020-12-23 DIAGNOSIS — R00.2 PALPITATIONS: ICD-10-CM

## 2020-12-23 LAB
ALBUMIN SERPL BCP-MCNC: 3.9 G/DL (ref 3.2–4.9)
ALBUMIN/GLOB SERPL: 1.5 G/DL
ALP SERPL-CCNC: 83 U/L (ref 30–99)
ALT SERPL-CCNC: 21 U/L (ref 2–50)
ANION GAP SERPL CALC-SCNC: 9 MMOL/L (ref 7–16)
AST SERPL-CCNC: 14 U/L (ref 12–45)
BASOPHILS # BLD AUTO: 0.7 % (ref 0–1.8)
BASOPHILS # BLD: 0.06 K/UL (ref 0–0.12)
BILIRUB SERPL-MCNC: 0.3 MG/DL (ref 0.1–1.5)
BUN SERPL-MCNC: 14 MG/DL (ref 8–22)
CALCIUM SERPL-MCNC: 8.8 MG/DL (ref 8.5–10.5)
CHLORIDE SERPL-SCNC: 107 MMOL/L (ref 96–112)
CO2 SERPL-SCNC: 24 MMOL/L (ref 20–33)
CREAT SERPL-MCNC: 0.81 MG/DL (ref 0.5–1.4)
EKG IMPRESSION: NORMAL
EOSINOPHIL # BLD AUTO: 0.21 K/UL (ref 0–0.51)
EOSINOPHIL NFR BLD: 2.5 % (ref 0–6.9)
ERYTHROCYTE [DISTWIDTH] IN BLOOD BY AUTOMATED COUNT: 38 FL (ref 35.9–50)
GLOBULIN SER CALC-MCNC: 2.6 G/DL (ref 1.9–3.5)
GLUCOSE SERPL-MCNC: 94 MG/DL (ref 65–99)
HCG SERPL QL: NEGATIVE
HCT VFR BLD AUTO: 43.9 % (ref 37–47)
HGB BLD-MCNC: 15 G/DL (ref 12–16)
IMM GRANULOCYTES # BLD AUTO: 0.03 K/UL (ref 0–0.11)
IMM GRANULOCYTES NFR BLD AUTO: 0.4 % (ref 0–0.9)
LYMPHOCYTES # BLD AUTO: 2.27 K/UL (ref 1–4.8)
LYMPHOCYTES NFR BLD: 27.2 % (ref 22–41)
MCH RBC QN AUTO: 29.5 PG (ref 27–33)
MCHC RBC AUTO-ENTMCNC: 34.2 G/DL (ref 33.6–35)
MCV RBC AUTO: 86.2 FL (ref 81.4–97.8)
MONOCYTES # BLD AUTO: 0.46 K/UL (ref 0–0.85)
MONOCYTES NFR BLD AUTO: 5.5 % (ref 0–13.4)
NEUTROPHILS # BLD AUTO: 5.33 K/UL (ref 2–7.15)
NEUTROPHILS NFR BLD: 63.7 % (ref 44–72)
NRBC # BLD AUTO: 0 K/UL
NRBC BLD-RTO: 0 /100 WBC
PLATELET # BLD AUTO: 244 K/UL (ref 164–446)
PMV BLD AUTO: 10.1 FL (ref 9–12.9)
POTASSIUM SERPL-SCNC: 3.7 MMOL/L (ref 3.6–5.5)
PROT SERPL-MCNC: 6.5 G/DL (ref 6–8.2)
RBC # BLD AUTO: 5.09 M/UL (ref 4.2–5.4)
SODIUM SERPL-SCNC: 140 MMOL/L (ref 135–145)
WBC # BLD AUTO: 8.4 K/UL (ref 4.8–10.8)

## 2020-12-23 PROCEDURE — 99283 EMERGENCY DEPT VISIT LOW MDM: CPT

## 2020-12-23 PROCEDURE — 80053 COMPREHEN METABOLIC PANEL: CPT

## 2020-12-23 PROCEDURE — 93005 ELECTROCARDIOGRAM TRACING: CPT

## 2020-12-23 PROCEDURE — 93005 ELECTROCARDIOGRAM TRACING: CPT | Performed by: EMERGENCY MEDICINE

## 2020-12-23 PROCEDURE — 85025 COMPLETE CBC W/AUTO DIFF WBC: CPT

## 2020-12-23 PROCEDURE — 84703 CHORIONIC GONADOTROPIN ASSAY: CPT

## 2020-12-23 PROCEDURE — 93971 EXTREMITY STUDY: CPT | Mod: RT
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
## 2021-03-24 ENCOUNTER — HOSPITAL ENCOUNTER (OUTPATIENT)
Facility: MEDICAL CENTER | Age: 26
End: 2021-03-24
Attending: PHYSICIAN ASSISTANT
Payer: MEDICAID

## 2021-03-24 PROCEDURE — U0005 INFEC AGEN DETEC AMPLI PROBE: HCPCS

## 2021-03-24 PROCEDURE — U0003 INFECTIOUS AGENT DETECTION BY NUCLEIC ACID (DNA OR RNA); SEVERE ACUTE RESPIRATORY SYNDROME CORONAVIRUS 2 (SARS-COV-2) (CORONAVIRUS DISEASE [COVID-19]), AMPLIFIED PROBE TECHNIQUE, MAKING USE OF HIGH THROUGHPUT TECHNOLOGIES AS DESCRIBED BY CMS-2020-01-R: HCPCS

## 2021-03-25 LAB
COVID ORDER STATUS COVID19: NORMAL
SARS-COV-2 RNA RESP QL NAA+PROBE: NOTDETECTED
SPECIMEN SOURCE: NORMAL

## 2021-03-30 ENCOUNTER — OFFICE VISIT (OUTPATIENT)
Dept: URGENT CARE | Facility: CLINIC | Age: 26
End: 2021-03-30
Payer: MEDICAID

## 2021-03-30 ENCOUNTER — HOSPITAL ENCOUNTER (OUTPATIENT)
Facility: MEDICAL CENTER | Age: 26
End: 2021-03-30
Attending: NURSE PRACTITIONER
Payer: MEDICAID

## 2021-03-30 VITALS
TEMPERATURE: 98 F | RESPIRATION RATE: 16 BRPM | BODY MASS INDEX: 49.61 KG/M2 | HEIGHT: 63 IN | OXYGEN SATURATION: 95 % | HEART RATE: 109 BPM | WEIGHT: 280 LBS

## 2021-03-30 DIAGNOSIS — N89.8 VAGINAL DISCHARGE: ICD-10-CM

## 2021-03-30 DIAGNOSIS — M54.50 ACUTE BILATERAL LOW BACK PAIN WITHOUT SCIATICA: ICD-10-CM

## 2021-03-30 DIAGNOSIS — Z3A.01 LESS THAN 8 WEEKS GESTATION OF PREGNANCY: ICD-10-CM

## 2021-03-30 DIAGNOSIS — G56.02 CARPAL TUNNEL SYNDROME OF LEFT WRIST: ICD-10-CM

## 2021-03-30 DIAGNOSIS — O98.211 GONORRHEA AFFECTING PREGNANCY IN FIRST TRIMESTER: Primary | ICD-10-CM

## 2021-03-30 DIAGNOSIS — B37.9 YEAST INFECTION: ICD-10-CM

## 2021-03-30 LAB
APPEARANCE UR: CLEAR
BILIRUB UR STRIP-MCNC: NEGATIVE MG/DL
COLOR UR AUTO: YELLOW
GLUCOSE UR STRIP.AUTO-MCNC: NEGATIVE MG/DL
INT CON NEG: NORMAL
INT CON POS: NORMAL
KETONES UR STRIP.AUTO-MCNC: NEGATIVE MG/DL
LEUKOCYTE ESTERASE UR QL STRIP.AUTO: NORMAL
NITRITE UR QL STRIP.AUTO: NEGATIVE
PH UR STRIP.AUTO: 6 [PH] (ref 5–8)
POC URINE PREGNANCY TEST: POSITIVE
PROT UR QL STRIP: NEGATIVE MG/DL
RBC UR QL AUTO: NEGATIVE
SP GR UR STRIP.AUTO: 1.03
UROBILINOGEN UR STRIP-MCNC: 1 MG/DL

## 2021-03-30 PROCEDURE — 87510 GARDNER VAG DNA DIR PROBE: CPT

## 2021-03-30 PROCEDURE — 81025 URINE PREGNANCY TEST: CPT | Performed by: NURSE PRACTITIONER

## 2021-03-30 PROCEDURE — 81002 URINALYSIS NONAUTO W/O SCOPE: CPT | Performed by: NURSE PRACTITIONER

## 2021-03-30 PROCEDURE — 87591 N.GONORRHOEAE DNA AMP PROB: CPT

## 2021-03-30 PROCEDURE — 87480 CANDIDA DNA DIR PROBE: CPT

## 2021-03-30 PROCEDURE — 99214 OFFICE O/P EST MOD 30 MIN: CPT | Performed by: NURSE PRACTITIONER

## 2021-03-30 PROCEDURE — 87491 CHLMYD TRACH DNA AMP PROBE: CPT

## 2021-03-30 PROCEDURE — 87660 TRICHOMONAS VAGIN DIR PROBE: CPT

## 2021-03-30 ASSESSMENT — FIBROSIS 4 INDEX: FIB4 SCORE: 0.35

## 2021-03-30 NOTE — LETTER
March 30, 2021         Patient: Earnestine Knight   YOB: 1995   Date of Visit: 3/30/2021           To Whom it May Concern:    Earnestine Knight was seen in my clinic on 3/30/2021. She may return to work on 4/3/2021.    If you have any questions or concerns, please don't hesitate to call.        Sincerely,           CHARITO Gold.  Electronically Signed

## 2021-03-31 ENCOUNTER — TELEPHONE (OUTPATIENT)
Dept: URGENT CARE | Facility: CLINIC | Age: 26
End: 2021-03-31

## 2021-03-31 DIAGNOSIS — N89.8 VAGINAL DISCHARGE: ICD-10-CM

## 2021-03-31 LAB
C TRACH DNA SPEC QL NAA+PROBE: NEGATIVE
CANDIDA DNA VAG QL PROBE+SIG AMP: POSITIVE
G VAGINALIS DNA VAG QL PROBE+SIG AMP: NEGATIVE
N GONORRHOEA DNA SPEC QL NAA+PROBE: POSITIVE
SPECIMEN SOURCE: ABNORMAL
T VAGINALIS DNA VAG QL PROBE+SIG AMP: NEGATIVE

## 2021-04-01 ENCOUNTER — TELEPHONE (OUTPATIENT)
Dept: URGENT CARE | Facility: CLINIC | Age: 26
End: 2021-04-01

## 2021-04-01 RX ORDER — AZITHROMYCIN 500 MG/1
1000 TABLET, FILM COATED ORAL ONCE
Qty: 2 TABLET | Refills: 0 | Status: SHIPPED | OUTPATIENT
Start: 2021-04-01 | End: 2021-04-01

## 2021-04-01 ASSESSMENT — ENCOUNTER SYMPTOMS
SORE THROAT: 0
EYE PAIN: 0
SHORTNESS OF BREATH: 0
MYALGIAS: 0
DIZZINESS: 0
FEVER: 0
CHILLS: 0
NAUSEA: 0
TINGLING: 1
BACK PAIN: 1
VOMITING: 0
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
## 2021-04-01 NOTE — TELEPHONE ENCOUNTER
Mark Orantes!      The lab called today and stated the patient tested positive for Gonorrhea.    Jyotsna

## 2021-04-02 ENCOUNTER — NON-PROVIDER VISIT (OUTPATIENT)
Dept: URGENT CARE | Facility: CLINIC | Age: 26
End: 2021-04-02
Payer: MEDICAID

## 2021-04-02 PROCEDURE — 96372 THER/PROPH/DIAG INJ SC/IM: CPT | Performed by: PHYSICIAN ASSISTANT

## 2021-04-02 NOTE — NON-PROVIDER
Earnestine Knight is a 26 y.o. female here for a non-provider visit for ceftriaxone 500mg  injection.    Reason for injection: STI  Order in MAR?: Yes  Patient supplied?:No  Minimum interval has been met for this injection (per MAR order): Yes    Order and dose verified by: Leora Ivan  Patient tolerated injection and no adverse effects were observed or reported: Yes    # of Administrations remaining in MAR: 0

## 2021-04-02 NOTE — ADDENDUM NOTE
Addended by: THIAGO DRISCOLL on: 4/1/2021 05:47 PM     Modules accepted: Orders, Level of Service

## 2021-05-18 ENCOUNTER — GYNECOLOGY VISIT (OUTPATIENT)
Dept: OBGYN | Facility: CLINIC | Age: 26
End: 2021-05-18
Payer: MEDICAID

## 2021-05-18 VITALS — WEIGHT: 282 LBS | SYSTOLIC BLOOD PRESSURE: 138 MMHG | BODY MASS INDEX: 49.95 KG/M2 | DIASTOLIC BLOOD PRESSURE: 108 MMHG

## 2021-05-18 DIAGNOSIS — N91.2 AMENORRHEA: ICD-10-CM

## 2021-05-18 DIAGNOSIS — O10.919 CHRONIC HYPERTENSION AFFECTING PREGNANCY: ICD-10-CM

## 2021-05-18 DIAGNOSIS — F99 PSYCHIATRIC DISORDER: ICD-10-CM

## 2021-05-18 DIAGNOSIS — F31.62 BIPOLAR DISORDER, CURRENT EPISODE MIXED, MODERATE (HCC): ICD-10-CM

## 2021-05-18 DIAGNOSIS — T74.91XD DOMESTIC VIOLENCE OF ADULT, SUBSEQUENT ENCOUNTER: ICD-10-CM

## 2021-05-18 PROCEDURE — 76830 TRANSVAGINAL US NON-OB: CPT | Performed by: OBSTETRICS & GYNECOLOGY

## 2021-05-18 PROCEDURE — 99203 OFFICE O/P NEW LOW 30 MIN: CPT | Mod: 25 | Performed by: OBSTETRICS & GYNECOLOGY

## 2021-05-18 RX ORDER — ARIPIPRAZOLE 10 MG/1
10 TABLET ORAL DAILY
Qty: 30 TABLET | Refills: 12 | Status: SHIPPED | OUTPATIENT
Start: 2021-05-18 | End: 2021-06-11

## 2021-05-18 RX ORDER — LABETALOL 200 MG/1
200 TABLET, FILM COATED ORAL 2 TIMES DAILY
Qty: 60 TABLET | Refills: 11 | Status: SHIPPED | OUTPATIENT
Start: 2021-05-18 | End: 2021-06-11

## 2021-05-18 RX ORDER — ESCITALOPRAM OXALATE 10 MG/1
10 TABLET ORAL DAILY
Qty: 30 TABLET | Refills: 11 | Status: SHIPPED | OUTPATIENT
Start: 2021-05-18 | End: 2021-06-11

## 2021-05-18 ASSESSMENT — FIBROSIS 4 INDEX: FIB4 SCORE: 0.35

## 2021-05-18 NOTE — NON-PROVIDER
Pt here for DUB +UPT [facility]  +FM  GAMA:9/26/21  Good#[phone]  Pharmacy verified  Pt states that her wrists have been hurting since being pregnant. Advised pt to get wrist braces for night time use.
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
## 2021-06-07 ENCOUNTER — TELEPHONE (OUTPATIENT)
Dept: OBGYN | Facility: CLINIC | Age: 26
End: 2021-06-07

## 2021-06-11 ENCOUNTER — OFFICE VISIT (OUTPATIENT)
Dept: URGENT CARE | Facility: CLINIC | Age: 26
End: 2021-06-11
Payer: MEDICAID

## 2021-06-11 VITALS
RESPIRATION RATE: 14 BRPM | HEIGHT: 63 IN | BODY MASS INDEX: 49.43 KG/M2 | DIASTOLIC BLOOD PRESSURE: 96 MMHG | OXYGEN SATURATION: 96 % | TEMPERATURE: 97.1 F | SYSTOLIC BLOOD PRESSURE: 138 MMHG | HEART RATE: 105 BPM | WEIGHT: 279 LBS

## 2021-06-11 DIAGNOSIS — L08.9 SKIN INFECTION: ICD-10-CM

## 2021-06-11 DIAGNOSIS — F15.10 METHAMPHETAMINE USE (HCC): ICD-10-CM

## 2021-06-11 DIAGNOSIS — Z3A.17 17 WEEKS GESTATION OF PREGNANCY: ICD-10-CM

## 2021-06-11 PROCEDURE — 99214 OFFICE O/P EST MOD 30 MIN: CPT | Performed by: NURSE PRACTITIONER

## 2021-06-11 RX ORDER — FOLIC ACID 1 MG/1
TABLET ORAL
COMMUNITY
Start: 2021-03-24 | End: 2021-08-17

## 2021-06-11 RX ORDER — CEPHALEXIN 500 MG/1
500 CAPSULE ORAL 3 TIMES DAILY
Qty: 15 CAPSULE | Refills: 0 | Status: SHIPPED | OUTPATIENT
Start: 2021-06-11 | End: 2021-06-16

## 2021-06-11 ASSESSMENT — ENCOUNTER SYMPTOMS
NERVOUS/ANXIOUS: 1
EYE REDNESS: 0
VOMITING: 0
DEPRESSION: 1
FEVER: 0
SHORTNESS OF BREATH: 0
CHILLS: 0
NAUSEA: 0
MYALGIAS: 0
DIZZINESS: 0
SORE THROAT: 0

## 2021-06-11 ASSESSMENT — LIFESTYLE VARIABLES: SUBSTANCE_ABUSE: 1

## 2021-06-11 ASSESSMENT — FIBROSIS 4 INDEX: FIB4 SCORE: 0.35

## 2021-06-12 NOTE — PROGRESS NOTES
Subjective:   Earnestine Knight is a 26 y.o. female who presents for Hypertension Follow-up (x 1 day.  [facility] sent pt. to us due to HBP.  She is 17 weeks pregnant.,  ) and Rash (x 1 week all over her body.  Pt. states she picks. )      HPI  Patient is a 26-year-old female who presents the urgent care after she was advised by the [facility] to come have her blood pressure checked.  Patient is 17 weeks pregnant.  Patient also expressing concern of possible infection of multiple lesions on her skin because from picking.  Patient does admit to using meth today.  She denies any chest pain, palpitations, shortness of breath.  Patient uncertain when her last OB/GYN appointment was she believes her next follow-up is July 6.  She denies any abdominal cramping and/or vaginal bleeding.  Patient states she does plan on keeping the baby.  She denies any fever, chills, nausea, vomiting.  Patient denies any suicidal ideation and/or homicidal.  Review of Systems   Constitutional: Negative for chills and fever.   HENT: Negative for sore throat.    Eyes: Negative for redness.   Respiratory: Negative for shortness of breath.    Cardiovascular: Negative for chest pain.   Gastrointestinal: Negative for nausea and vomiting.   Genitourinary: Negative for dysuria.   Musculoskeletal: Negative for myalgias.   Skin: Positive for rash. Negative for itching.   Neurological: Negative for dizziness.   Psychiatric/Behavioral: Positive for depression and substance abuse. The patient is nervous/anxious.        Medications:    • cephALEXin Caps    Allergies: Strattera [atomoxetine]    Problem List: Earnestine Knight does not have any pertinent problems on file.    Surgical History:  Past Surgical History:   Procedure Laterality Date   • REPEAT C SECTION  1/11/2017    Procedure: REPEAT C SECTION;  Surgeon: Yu Prabhakar M.D.;  Location: LABOR AND DELIVERY;  Service:    • REPEAT C SECTION  11/3/2015     "Procedure: REPEAT C SECTION;  Surgeon: Ricky Vega M.D.;  Location: LABOR AND DELIVERY;  Service:    • REPEAT C SECTION  2014   • PRIMARY C SECTION  2013       Past Social Hx: Earnestine Knight  reports that she has been smoking cigarettes. She has a 9.00 pack-year smoking history. She has never used smokeless tobacco. She reports that she does not drink alcohol and does not use drugs.     Past Family Hx:  Earnestine Knight family history includes Alcohol/Drug in her father and mother; Cancer in her maternal grandfather; Diabetes in her maternal grandfather; Heart Disease (age of onset: 40) in her maternal aunt.     Problem list, medications, and allergies reviewed by myself today in Epic.     Objective:     /96   Pulse (!) 105   Temp 36.2 °C (97.1 °F) (Temporal)   Resp 14   Ht 1.6 m (5' 3\")   Wt (!) 127 kg (279 lb)   LMP 12/23/2020   SpO2 96%   BMI 49.42 kg/m²     Physical Exam  Vitals and nursing note reviewed.   Constitutional:       General: She is not in acute distress.     Appearance: She is well-developed.   HENT:      Head: Normocephalic and atraumatic.      Right Ear: External ear normal.      Left Ear: External ear normal.      Nose: Nose normal.      Mouth/Throat:      Mouth: Mucous membranes are moist.   Eyes:      Conjunctiva/sclera: Conjunctivae normal.   Cardiovascular:      Rate and Rhythm: Normal rate.   Pulmonary:      Effort: Pulmonary effort is normal. No respiratory distress.      Breath sounds: Normal breath sounds.   Abdominal:      General: There is no distension.   Musculoskeletal:         General: Normal range of motion.   Skin:     General: Skin is warm and dry.      Findings: Erythema and rash present.          Neurological:      General: No focal deficit present.      Mental Status: She is alert and oriented to person, place, and time. Mental status is at baseline.      Gait: Gait (gait at baseline) normal.   Psychiatric:         Attention and " Perception: Attention normal.         Mood and Affect: Mood is anxious and depressed.         Behavior: Behavior is hyperactive.         Thought Content: Thought content normal.         Cognition and Memory: Cognition and memory normal.         Judgment: Judgment normal.      Comments: Crying throughout exam         Assessment/Plan:     Diagnosis and associated orders:     1. Skin infection  cephALEXin (KEFLEX) 500 MG Cap   2. Methamphetamine use (HCC)     3. 17 weeks gestation of pregnancy        Comments/MDM:     • Patient is a 26-year-old female present with stated above, patient was sent to have a blood pressure check, blood pressure within normal limits, patient having no chest pain or shortness of breath.  She does admit to using methamphetamines today.  Advised patient that she needs to follow-up with her OB/GYN for further evaluation and management of pregnancy being complicated with intrauterine drug exposure patient does have diffuse erythematous scabbed lesions will treat with oral Keflex.  • Differential diagnosis, natural history, supportive care, and indications for immediate follow-up discussed.            Please note that this dictation was created using voice recognition software. I have made a reasonable attempt to correct obvious errors, but I expect that there are errors of grammar and possibly content that I did not discover before finalizing the note.    This note was electronically signed by Lamberto LEVY.

## 2021-06-15 ENCOUNTER — ROUTINE PRENATAL (OUTPATIENT)
Dept: OBGYN | Facility: CLINIC | Age: 26
End: 2021-06-15
Payer: MEDICAID

## 2021-06-15 VITALS — BODY MASS INDEX: 49.95 KG/M2 | SYSTOLIC BLOOD PRESSURE: 130 MMHG | WEIGHT: 282 LBS | DIASTOLIC BLOOD PRESSURE: 84 MMHG

## 2021-06-15 DIAGNOSIS — O09.899 SUPERVISION OF OTHER HIGH RISK PREGNANCY, ANTEPARTUM: Primary | ICD-10-CM

## 2021-06-15 PROBLEM — Z30.9 ENCOUNTER FOR BIRTH CONTROL: Status: RESOLVED | Noted: 2017-12-19 | Resolved: 2021-06-15

## 2021-06-15 PROCEDURE — 59402 PR NEW OB HIGH RISK: CPT | Performed by: NURSE PRACTITIONER

## 2021-06-15 RX ORDER — ESCITALOPRAM OXALATE 10 MG/1
10 TABLET ORAL DAILY
COMMUNITY
End: 2021-08-17

## 2021-06-15 RX ORDER — PNV NO.95/FERROUS FUM/FOLIC AC 28MG-0.8MG
1 TABLET ORAL DAILY
Qty: 30 TABLET | Refills: 11 | Status: SHIPPED | OUTPATIENT
Start: 2021-06-15 | End: 2021-06-16 | Stop reason: SDUPTHER

## 2021-06-15 RX ORDER — ARIPIPRAZOLE 10 MG/1
10 TABLET ORAL DAILY
COMMUNITY
End: 2022-08-21

## 2021-06-15 RX ORDER — BLOOD-GLUCOSE METER
1 KIT MISCELLANEOUS 2 TIMES DAILY
Qty: 1 EACH | Refills: 0 | Status: SHIPPED | OUTPATIENT
Start: 2021-06-15 | End: 2021-06-16 | Stop reason: SDUPTHER

## 2021-06-15 ASSESSMENT — ENCOUNTER SYMPTOMS
GASTROINTESTINAL NEGATIVE: 1
EYES NEGATIVE: 1
RESPIRATORY NEGATIVE: 1
NEUROLOGICAL NEGATIVE: 1
MUSCULOSKELETAL NEGATIVE: 1
PSYCHIATRIC NEGATIVE: 1
CONSTITUTIONAL NEGATIVE: 1
CARDIOVASCULAR NEGATIVE: 1

## 2021-06-15 ASSESSMENT — FIBROSIS 4 INDEX: FIB4 SCORE: 0.35

## 2021-06-15 NOTE — PROGRESS NOTES
Pt here for New OB today  Phone: [phone]  Pharmacy verified  Last pap/result: 2017   Pt is not taking PNV, requesting Rx  Pt Desires AFP  Pt has not started Labetalol for HTN  Currently taking Abilify 10 mg QD and Lexapro 10mg QD  US scheduled for 7/6/21  Pt has not done PN Labs and states will go tomorrow  Pt reports she fell on 6/13/21 and landed on her L knee, pt reports she is not sure if she hit her abdomen  Pt is in [facility] and reports she last used Meth 2 days ago
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
## 2021-06-16 RX ORDER — BLOOD-GLUCOSE METER
1 KIT MISCELLANEOUS 2 TIMES DAILY
Qty: 1 EACH | Refills: 0 | Status: SHIPPED | OUTPATIENT
Start: 2021-06-16 | End: 2022-08-21

## 2021-06-16 RX ORDER — PNV NO.95/FERROUS FUM/FOLIC AC 28MG-0.8MG
1 TABLET ORAL DAILY
Qty: 30 TABLET | Refills: 11 | Status: SHIPPED | OUTPATIENT
Start: 2021-06-16 | End: 2022-08-21

## 2021-06-16 NOTE — TELEPHONE ENCOUNTER
Returned pt's phone dequan and spoke with pt. Pt stated thst she was not able to filled her prenatal vitamins or get her Rx for Blood pressure cuff at [facility] on [address] [place] [address]. Pt requesting to get both prescriptions send to [facility]. I told pt that I was going ask one of the provider resend the Rx's to the [facility] pharmacy. Pt had no further questions.

## 2021-06-23 ENCOUNTER — TELEPHONE (OUTPATIENT)
Dept: OBGYN | Facility: CLINIC | Age: 26
End: 2021-06-23

## 2021-06-23 DIAGNOSIS — O09.899 SUPERVISION OF OTHER HIGH RISK PREGNANCY, ANTEPARTUM: ICD-10-CM

## 2021-06-23 RX ORDER — AZITHROMYCIN 1 G/1
1 POWDER, FOR SUSPENSION ORAL ONCE
Qty: 1 EACH | Refills: 0 | Status: SHIPPED | OUTPATIENT
Start: 2021-06-23 | End: 2021-06-23

## 2021-06-23 NOTE — TELEPHONE ENCOUNTER
Per Dr. Torres to call pt and inform her she is +for Gonorrhea and to schedule pt to come in to the office and receive a rocephin injection and also to  from pharmacy azithromycin. Pt to inform current partner and any partners in the last 3 months to receive Tx with PCP or [facility].      6/23/2021 1618 Left message for pt to call back regarding lab results.     Juana Fernandez C.N.M.   6/24/2021  9:15 AM PDT Back to Top      Please call patient with results. She needs to come in for treatment. Also have her do Monistat 7 OTC for the yeast infection.  Normal pap, but vaginal infection and STI noted     6/24/2021 [facility] form and lab results faxed to [facility]  6/25/2021 1026 called pt and notified regarding positive chlamydia and need to  Rx from her pharmacy and Advised pt make a note of the date she took medication because she needs to be retested 4 wks after she had taken meds. Advised for her partner or any partner in the last 3 months to be treated with his PCP or [facility]. Pt verbalized understanding. Offered pt to give [facility] phone# for her partner to receive Tx. Pt declined and stated she will just have to stop having sex with him because she tested positive for gonorhea 3 months ago and he already new and he was not treated. Pt scheduled for Rocephin injection and BP check for today at 1330. Pt agreed.   Pt also informed to use OTC monistat 7x7 night for yeast infection. Pt agreed and verbalized understanding.

## 2021-06-24 PROBLEM — O98.219 GONORRHEA AFFECTING PREGNANCY, ANTEPARTUM: Status: ACTIVE | Noted: 2021-06-24

## 2021-06-25 ENCOUNTER — NON-PROVIDER VISIT (OUTPATIENT)
Dept: OBGYN | Facility: CLINIC | Age: 26
End: 2021-06-25
Payer: MEDICAID

## 2021-06-25 VITALS — DIASTOLIC BLOOD PRESSURE: 86 MMHG | SYSTOLIC BLOOD PRESSURE: 142 MMHG

## 2021-06-25 DIAGNOSIS — O98.219 GONORRHEA AFFECTING PREGNANCY, ANTEPARTUM: ICD-10-CM

## 2021-06-25 PROCEDURE — 96372 THER/PROPH/DIAG INJ SC/IM: CPT | Performed by: PHYSICIAN ASSISTANT

## 2021-06-25 NOTE — NON-PROVIDER
Pt here for nurse visit for blood pressure check and rocephin injection.   Notified provider Allie HENLEY of blood pressure readings.  Rocephin   NDC: 1114-2681-17  LOT#: [id number]  Expiration Date: 2023  Dose: Add 1.8 mL of lidocaine to ceftriaxone 500 mg vial diluted to 250 mg/mL per package insert, per order.  Site: Right Upper Outer Quadrant Gluteal  Patient educated on use and side effects of medication. Name and  verified prior to injection. Pt tolerated? well   Verified by Zoraida AGARWAL  Administered by Chano Medina Ass't at 1:52 PM.  Patient Provided Medication: no  Lidocaine 1%  NDC 92735-149-06  Lot # [id number]  Exp 2024

## 2021-07-06 ENCOUNTER — APPOINTMENT (OUTPATIENT)
Dept: RADIOLOGY | Facility: IMAGING CENTER | Age: 26
End: 2021-07-06
Attending: OBSTETRICS & GYNECOLOGY
Payer: MEDICAID

## 2021-07-06 DIAGNOSIS — O10.919 CHRONIC HYPERTENSION AFFECTING PREGNANCY: ICD-10-CM

## 2021-07-06 PROCEDURE — 76805 OB US >/= 14 WKS SNGL FETUS: CPT | Mod: TC | Performed by: OBSTETRICS & GYNECOLOGY

## 2021-07-28 ENCOUNTER — HOSPITAL ENCOUNTER (EMERGENCY)
Facility: MEDICAL CENTER | Age: 26
End: 2021-07-28
Attending: OBSTETRICS & GYNECOLOGY | Admitting: OBSTETRICS & GYNECOLOGY
Payer: MEDICAID

## 2021-07-28 ENCOUNTER — TELEPHONE (OUTPATIENT)
Dept: OBGYN | Facility: CLINIC | Age: 26
End: 2021-07-28

## 2021-07-28 VITALS
RESPIRATION RATE: 18 BRPM | TEMPERATURE: 97.5 F | SYSTOLIC BLOOD PRESSURE: 135 MMHG | DIASTOLIC BLOOD PRESSURE: 86 MMHG | HEART RATE: 113 BPM | OXYGEN SATURATION: 100 %

## 2021-07-28 LAB
ABO GROUP BLD: NORMAL
AMPHET UR QL SCN: POSITIVE
APPEARANCE UR: ABNORMAL
BARBITURATES UR QL SCN: NEGATIVE
BASOPHILS # BLD AUTO: 0.5 % (ref 0–1.8)
BASOPHILS # BLD: 0.07 K/UL (ref 0–0.12)
BENZODIAZ UR QL SCN: NEGATIVE
BLD GP AB SCN SERPL QL: NORMAL
BZE UR QL SCN: NEGATIVE
CANNABINOIDS UR QL SCN: NEGATIVE
COLOR UR AUTO: ABNORMAL
CREAT UR-MCNC: 250.26 MG/DL
EOSINOPHIL # BLD AUTO: 0.18 K/UL (ref 0–0.51)
EOSINOPHIL NFR BLD: 1.2 % (ref 0–6.9)
ERYTHROCYTE [DISTWIDTH] IN BLOOD BY AUTOMATED COUNT: 45 FL (ref 35.9–50)
GLUCOSE UR QL STRIP.AUTO: NEGATIVE MG/DL
HBV SURFACE AG SER QL: ABNORMAL
HCT VFR BLD AUTO: 36.6 % (ref 37–47)
HCV AB SER QL: ABNORMAL
HGB BLD-MCNC: 12.9 G/DL (ref 12–16)
HIV 1+2 AB+HIV1 P24 AG SERPL QL IA: NORMAL
IMM GRANULOCYTES # BLD AUTO: 0.12 K/UL (ref 0–0.11)
IMM GRANULOCYTES NFR BLD AUTO: 0.8 % (ref 0–0.9)
KETONES UR QL STRIP.AUTO: NEGATIVE MG/DL
LEUKOCYTE ESTERASE UR QL STRIP.AUTO: NEGATIVE
LYMPHOCYTES # BLD AUTO: 2.46 K/UL (ref 1–4.8)
LYMPHOCYTES NFR BLD: 16.9 % (ref 22–41)
MCH RBC QN AUTO: 30.4 PG (ref 27–33)
MCHC RBC AUTO-ENTMCNC: 35.2 G/DL (ref 33.6–35)
MCV RBC AUTO: 86.3 FL (ref 81.4–97.8)
METHADONE UR QL SCN: NEGATIVE
MONOCYTES # BLD AUTO: 0.81 K/UL (ref 0–0.85)
MONOCYTES NFR BLD AUTO: 5.6 % (ref 0–13.4)
NEUTROPHILS # BLD AUTO: 10.89 K/UL (ref 2–7.15)
NEUTROPHILS NFR BLD: 75 % (ref 44–72)
NITRITE UR QL STRIP.AUTO: NEGATIVE
NRBC # BLD AUTO: 0 K/UL
NRBC BLD-RTO: 0 /100 WBC
OPIATES UR QL SCN: NEGATIVE
OXYCODONE UR QL SCN: NEGATIVE
PCP UR QL SCN: NEGATIVE
PH UR STRIP.AUTO: 5.5 [PH] (ref 5–8)
PLATELET # BLD AUTO: 198 K/UL (ref 164–446)
PMV BLD AUTO: 10.2 FL (ref 9–12.9)
PROPOXYPH UR QL SCN: NEGATIVE
PROT UR QL STRIP: 30 MG/DL
PROT UR-MCNC: 20 MG/DL (ref 0–15)
PROT/CREAT UR: 80 MG/G (ref 10–107)
RBC # BLD AUTO: 4.24 M/UL (ref 4.2–5.4)
RBC UR QL AUTO: NEGATIVE
RH BLD: NORMAL
RUBV AB SER QL: 312 IU/ML
SP GR UR STRIP.AUTO: >=1.03 (ref 1–1.03)
TREPONEMA PALLIDUM IGG+IGM AB [PRESENCE] IN SERUM OR PLASMA BY IMMUNOASSAY: ABNORMAL
WBC # BLD AUTO: 14.5 K/UL (ref 4.8–10.8)

## 2021-07-28 PROCEDURE — 84156 ASSAY OF PROTEIN URINE: CPT | Mod: XU

## 2021-07-28 PROCEDURE — 87340 HEPATITIS B SURFACE AG IA: CPT

## 2021-07-28 PROCEDURE — 86900 BLOOD TYPING SEROLOGIC ABO: CPT

## 2021-07-28 PROCEDURE — 82570 ASSAY OF URINE CREATININE: CPT | Mod: XU

## 2021-07-28 PROCEDURE — 80307 DRUG TEST PRSMV CHEM ANLYZR: CPT

## 2021-07-28 PROCEDURE — 81002 URINALYSIS NONAUTO W/O SCOPE: CPT | Mod: XU

## 2021-07-28 PROCEDURE — 87389 HIV-1 AG W/HIV-1&-2 AB AG IA: CPT

## 2021-07-28 PROCEDURE — 85025 COMPLETE CBC W/AUTO DIFF WBC: CPT

## 2021-07-28 PROCEDURE — 86803 HEPATITIS C AB TEST: CPT

## 2021-07-28 PROCEDURE — 86850 RBC ANTIBODY SCREEN: CPT

## 2021-07-28 PROCEDURE — 86901 BLOOD TYPING SEROLOGIC RH(D): CPT

## 2021-07-28 PROCEDURE — 86762 RUBELLA ANTIBODY: CPT

## 2021-07-28 PROCEDURE — 86780 TREPONEMA PALLIDUM: CPT

## 2021-07-28 PROCEDURE — 99284 EMERGENCY DEPT VISIT MOD MDM: CPT

## 2021-07-28 PROCEDURE — 36415 COLL VENOUS BLD VENIPUNCTURE: CPT

## 2021-07-28 NOTE — TELEPHONE ENCOUNTER
Pt calling she missed her appt and wanting to reschedule. Pt also reporting today around noon she started noticing discoloration on her hands, stated her hands are bright blue and also noticed her vagina is purpulish/blue color and reporting tingling with pain on her hands with any type of movement. Pt also stated she has not felt baby moved since her symptoms started today.    I put pt on hold and consulted with Shahnaz Jerry.  After consulting with Shahnaz I informed pt. that tingling on hands can happened in pregnancy. Shahnaz is advising for  her to go to the hospital due to baby not moving. Pt verbalized understanding and will comply with instructions. Pt's call was transferred to [facility] to reschedule her appt.

## 2021-07-29 PROBLEM — O99.322 DRUG USE AFFECTING PREGNANCY IN SECOND TRIMESTER: Chronic | Status: ACTIVE | Noted: 2021-07-29

## 2021-07-29 PROBLEM — O99.322 DRUG USE AFFECTING PREGNANCY IN SECOND TRIMESTER: Status: ACTIVE | Noted: 2021-07-29

## 2021-07-29 ASSESSMENT — ENCOUNTER SYMPTOMS
VOMITING: 0
HEARTBURN: 0
DIZZINESS: 0
COUGH: 0
NAUSEA: 0
MYALGIAS: 0
CHILLS: 0
HEADACHES: 0
FEVER: 0
ABDOMINAL PAIN: 0

## 2021-07-29 NOTE — PROGRESS NOTES
"1918-Pt presents to L&D c/o decreased FM and \"blue hands and feet\". Pt reports being diagnosed with carpal tunnel with this pregnancy. Denies UC's, LOF or VB. Hx of c/s. TOCO applied. FHT 150s via doppler. VS taken. POC discussed  1915-Updated Roel HOLLOWAY in department on pt arrival/complaint/status, prenatal labs orders, provider to see pt at bedside, anticipate discharge home  1920-Roel HOLLOWAY at bedside, POC discussed  2045-Pt discharged home, ambulatory and undelivered. Pt walked out by Roel HOLLOWAY, discharge paperwork was not signed  "

## 2021-07-29 NOTE — ED PROVIDER NOTES
"Emergency Obstetric Consultation     Date of Service  2021    Reason for Consultation  Chief Complaint   Patient presents with   • Other     \"blue hands/feet\"   • Decreased Fetal Movement       History of Presenting Illness  26 y.o. female who presented 2021 with Reason for consult: nauseaPatient presents to labor and delivery for concern regarding blue hand and feet. She believes she also has carpal tunnel.  She also reports that she is actively using methamphetamine and would like help in getting sober and in housing/help.     Fetal activity: Perceived fetal activity is normal.      Prenatal complications: Substance abuse.      .    Review of Systems  Review of Systems   Constitutional: Negative for chills, fever and malaise/fatigue.   Respiratory: Negative for cough.    Gastrointestinal: Negative for abdominal pain, heartburn, nausea and vomiting.   Genitourinary: Negative for dysuria and urgency.   Musculoskeletal: Positive for joint pain. Negative for myalgias.   Skin: Negative for rash.   Neurological: Negative for dizziness and headaches.       Obstetric History    OB History    Para Term  AB Living   5 4 4     3   SAB TAB Ectopic Molar Multiple Live Births             3      # Outcome Date GA Lbr Ramin/2nd Weight Sex Delivery Anes PTL Lv   5 Current            4 Term 17 37w2d  3.54 kg (7 lb 12.9 oz) M CS-LTranv Spinal Y THIAGO      Birth Comments: He was a 37wk 2d old  infant.  BW 7lbs 12.9 oz. He was placed in foster care at birth.   3 Term 09/08/15 40w0d  3.827 kg (8 lb 7 oz) M CS-Unspec Spinal N FD      Birth Comments: SIDS   2 Term 2014 39w0d  3.345 kg (7 lb 6 oz) F CS-LTranv Spinal  THIAGO   1 Term 13 40w0d  4.706 kg (10 lb 6 oz) F CS-LTranv EPI N THIAGO      Complications: Failure to Progress in Second Stage       Gynecologic History  Patient's last menstrual period was 2020.    Medical History  Past Medical History:   Diagnosis Date   • Anxiety     " currently on medications.    • Anxiety disorder    • Bipolar disorder (HCC)    • Depression    • History of  x 3 needs another csections 2016   • Hypertension 2016    PIH   • Migraine    • PTSD (post-traumatic stress disorder)    • SIDS (sudden infant death syndrome) with last baby at 6 weeks of age 2016   • Tobacco dependence says cut down to 5 cigarettes/day 2016       Surgical History   has a past surgical history that includes primary c section (); repeat c section (); repeat c section (11/3/2015); and repeat c section (2017).    Family History  family history includes Alcohol/Drug in her father and mother; Cancer in her maternal grandfather; Diabetes in her maternal grandfather; Heart Disease (age of onset: 40) in her maternal aunt.    Social History   reports that she has been smoking cigarettes. She has a 9.00 pack-year smoking history. She has never used smokeless tobacco. She reports current drug use. Drug: Methamphetamines. She reports that she does not drink alcohol.    Medications  No medications prior to admission.       Allergies  Allergies   Allergen Reactions   • Strattera [Atomoxetine] Shortness of Breath       Physical Exam  Mode: hand-held doppler probe.    Baseline rate: 145.       Skin:     General: Skin is dry.      Findings: Bruising present.   HENT:      Head: Normocephalic.   Cardiovascular:      Rate and Rhythm: Regular rhythm. Tachycardia present.   Abdominal:      Palpations: Abdomen is soft.      Comments: Obese, BS x 4   Constitutional:       Appearance: Normal appearance.   Pulmonary:      Breath sounds: Normal breath sounds.   Psychiatric:         Attention and Perception: She is inattentive.         Mood and Affect: Mood is anxious. Affect is labile.         Speech: Speech is tangential. Speech is not rapid and pressured.         Behavior: Behavior is agitated. Behavior is cooperative.         Judgment: Judgment is impulsive.      Comments:  Thought content jumbled.    Neurological:      Mental Status: She is alert.         Laboratory  Recent Labs     21   WBC 14.5*   RBC 4.24   HEMOGLOBIN 12.9   HEMATOCRIT 36.6*   MCV 86.3   MCH 30.4   MCHC 35.2*   RDW 45.0   PLATELETCT 198   MPV 10.2         Assessment & Plan  Assessment:  Not in labor.   1. 25 yo  with IUP at 24w1d  2. Carpal Tunnel  3. Methamphetamine Use is pregnancy  4. Bipolar Disorder  5. Poor social situation    Plan:  1. Discussed normal complaints of pregnancy and comfort measures for her hands including wrist braces.   2. Reviewed resources locally. I personally called [facility] and kristofer will accept a referral for her. She also was provided resources to [facility] in [place]. Will need to get hooked in with WIC.  At this time, patient could restart Abilify and Lexapro but will defer to mental health provider.   3. Escorted patient to elevator with normal labor precautions.           ESTHER Cruz

## 2021-08-17 ENCOUNTER — GYNECOLOGY VISIT (OUTPATIENT)
Dept: OBGYN | Facility: CLINIC | Age: 26
End: 2021-08-17
Payer: MEDICAID

## 2021-08-17 VITALS — WEIGHT: 282 LBS | BODY MASS INDEX: 49.95 KG/M2 | DIASTOLIC BLOOD PRESSURE: 82 MMHG | SYSTOLIC BLOOD PRESSURE: 134 MMHG

## 2021-08-17 DIAGNOSIS — Z34.82 ENCOUNTER FOR SUPERVISION OF OTHER NORMAL PREGNANCY IN SECOND TRIMESTER: ICD-10-CM

## 2021-08-17 DIAGNOSIS — O99.322 DRUG USE AFFECTING PREGNANCY IN SECOND TRIMESTER: ICD-10-CM

## 2021-08-17 DIAGNOSIS — G56.03 CARPAL TUNNEL SYNDROME ON BOTH SIDES: ICD-10-CM

## 2021-08-17 DIAGNOSIS — O99.210 OBESITY IN PREGNANCY: ICD-10-CM

## 2021-08-17 DIAGNOSIS — N89.8 VAGINAL LESION: ICD-10-CM

## 2021-08-17 PROCEDURE — 90040 PR PRENATAL FOLLOW UP: CPT | Performed by: ADVANCED PRACTICE MIDWIFE

## 2021-08-17 ASSESSMENT — FIBROSIS 4 INDEX: FIB4 SCORE: 0.43

## 2021-08-17 NOTE — NON-PROVIDER
Pt here for GYN f/u.     Pt states her hands are tingly, and numb. States she cant do her daily functions.   Good# [phone]  Pharmacy confirmed

## 2021-08-17 NOTE — PROGRESS NOTES
"Linda,  through [facility] is present at visit today.     Patient here for SELENA visit. Affirms fetal movement, denies vaginal bleeding or cramping. Has had significant changes in past few weeks. She reports that she went to [facility] for detox. She has also had her intake at [facility] as assessment was completed at [facility]. Sridevi at [facility] is working as they have an open bed. Patient recently went to [facility] and is out on bail. Court date is October 5th. She has established with a psychiatrist and is being prescribed Abilify and (likely vistaril but unsure). Repeat growth ultrasound ordered for drug use in pregnancy.     Regarding her pregnancy, patient reports that the baby is moving off and on. She reports difficulty holding objects and doing daily activities related to pain she has in both wrists and hands. She often will drop items and experiences swelling.She reports that the only relief she receives is with sitting in a hot bath multiple times. She has also tried wrist braces and has been wearing these at night. She reports that she tried arthritis relief gel without success. Physical therapy referral for this reason.     She reports she is having some pain in her vagina between the right lip and inner thigh. She reports that it feels like there is a \"hole\". Exam reveals 1.5 cm diameter circular ulceration with purulent drainage noted. There is no erythema on edges. Discussed with patient current differentials that include genital herpes, syphilis or complicated folliculitis. Swabs were collected of this and DARBY for gonorrhea was also collected today. Repeat HIV and Hep C with 3rd trimester labs. Will try to obtain labs from [facility] as patient believes she completed a glucose there. However, I would like all labs redrawn and she agrees.       FH: 31 cm   FHTs: 138 bpm        "

## 2021-08-19 ENCOUNTER — HOSPITAL ENCOUNTER (EMERGENCY)
Facility: MEDICAL CENTER | Age: 26
End: 2021-08-19
Payer: MEDICAID

## 2021-08-19 VITALS
SYSTOLIC BLOOD PRESSURE: 144 MMHG | WEIGHT: 279.98 LBS | TEMPERATURE: 96.8 F | HEART RATE: 92 BPM | BODY MASS INDEX: 51.52 KG/M2 | OXYGEN SATURATION: 96 % | DIASTOLIC BLOOD PRESSURE: 88 MMHG | HEIGHT: 62 IN | RESPIRATION RATE: 16 BRPM

## 2021-08-19 PROCEDURE — 302449 STATCHG TRIAGE ONLY (STATISTIC)

## 2021-08-19 ASSESSMENT — FIBROSIS 4 INDEX: FIB4 SCORE: 0.43

## 2021-08-19 NOTE — ED TRIAGE NOTES
Chief Complaint   Patient presents with   • Body Aches     Pt reports last dose meth on Sun, since pt developed symptoms.    • Fatigue   • Other     26wks pregnant, denies issues     Explained to pt triage process, made pt aware to tell this RN/staff of any changes/concerns, pt verbalized understanding of process and instructions given. Pt to ER lorne.

## 2021-08-26 DIAGNOSIS — N89.8 VAGINAL LESION: ICD-10-CM

## 2021-09-07 ENCOUNTER — GYNECOLOGY VISIT (OUTPATIENT)
Dept: OBGYN | Facility: CLINIC | Age: 26
End: 2021-09-07
Payer: MEDICAID

## 2021-09-07 ENCOUNTER — APPOINTMENT (OUTPATIENT)
Dept: OBGYN | Facility: CLINIC | Age: 26
End: 2021-09-07
Payer: MEDICAID

## 2021-09-07 DIAGNOSIS — Z53.8 APPOINTMENT CANCELED BY HOSPITAL: ICD-10-CM

## 2021-09-07 PROCEDURE — 99999 PR NO CHARGE: CPT | Performed by: ADVANCED PRACTICE MIDWIFE

## 2021-09-08 ENCOUNTER — DOCUMENTATION (OUTPATIENT)
Dept: OBGYN | Facility: CLINIC | Age: 26
End: 2021-09-08

## 2021-09-08 DIAGNOSIS — O98.113 SYPHILIS AFFECTING PREGNANCY, THIRD TRIMESTER: ICD-10-CM

## 2021-09-08 NOTE — PROGRESS NOTES
Patient presented to clinic today stating that she needed a penicillin shot. She thought that I would know what this was needed for. I did not have any record of positive syphilis result. I called [facility] who indicated that they did not have results. No record from [facility] regarding syphilis results.     I called Bere at health Department who indicates that patient was diagnosed at the assisted on 8/27/2021. However, no treatment was initiated. Discussed with Bere that I attempted to call patient with her listed number and number of secondary person indicated in chart with no response. She is aware that she was at [facility]. She will reach out to patient.     We do not have penicillin in office and generally this is only accessed through health department, ED, or [facility].  I instructed to Bere that if she contacted patient, we could also access this through Labor and Delivery triage if necessary.       Bere did call back and informed me that they were able to contact the patient. She has an updated phone number for patient.  [phone]  They will start therapy on Thursday 9/9/2021

## 2021-09-28 ENCOUNTER — DOCUMENTATION (OUTPATIENT)
Dept: OBGYN | Facility: CLINIC | Age: 26
End: 2021-09-28

## 2021-09-28 NOTE — PROGRESS NOTES
Attempted to call patient as she has missed last two appointments in clinic. Phone not accepting incoming calls at this time.

## 2021-10-01 ENCOUNTER — ROUTINE PRENATAL (OUTPATIENT)
Dept: OBGYN | Facility: CLINIC | Age: 26
End: 2021-10-01
Payer: MEDICAID

## 2021-10-01 VITALS — SYSTOLIC BLOOD PRESSURE: 118 MMHG | WEIGHT: 280 LBS | BODY MASS INDEX: 51.21 KG/M2 | DIASTOLIC BLOOD PRESSURE: 70 MMHG

## 2021-10-01 DIAGNOSIS — O09.93 ENCOUNTER FOR SUPERVISION OF HIGH RISK PREGNANCY IN THIRD TRIMESTER, ANTEPARTUM: Primary | ICD-10-CM

## 2021-10-01 DIAGNOSIS — F15.10 METHAMPHETAMINE USE (HCC): ICD-10-CM

## 2021-10-01 DIAGNOSIS — O36.8131 DECREASED FETAL MOVEMENT AFFECTING MANAGEMENT OF PREGNANCY IN THIRD TRIMESTER, FETUS 1: ICD-10-CM

## 2021-10-01 LAB
NST ACOUSTIC STIMULATION: NORMAL
NST ACTION NECESSARY: NORMAL
NST ASSESSMENT: NORMAL
NST BASELINE: NORMAL
NST INDICATIONS: NORMAL
NST OTHER DATA: NORMAL
NST READ BY: NORMAL
NST RETURN: NORMAL
NST UTERINE ACTIVITY: NORMAL

## 2021-10-01 PROCEDURE — 90040 PR PRENATAL FOLLOW UP: CPT | Performed by: NURSE PRACTITIONER

## 2021-10-01 ASSESSMENT — FIBROSIS 4 INDEX: FIB4 SCORE: 0.43
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
## 2021-10-01 NOTE — PROGRESS NOTES
OB follow up   + fetal movement. Active  No VB, LOF or UC's.  Phone # [phone]  Preferred pharmacy confirmed.  Flu vaccine Declined  Tdap Declined  Kick count sheet given today.  BTL Declined   C/o   Pt thinks her water broke   Used meth yesterday

## 2021-10-01 NOTE — LETTER
"Count Your Baby's Movements  Another step to a healthy delivery    Earnestine Knight             Dept: [phone]    How Many Weeks Pregnant? 33w2d    Date to Begin Counting: 10/01/2021              How to use this chart    One way for your physician to keep track of your baby's health is by knowing how often the baby moves (or \"kicks\") in your womb.  You can help your physician to do this by using this chart every day.    Every day, you should see how many hours it takes for your baby to move 10 times.  Start in the morning, as soon as you get up.    · First, write down the time your baby moves until you get to 10.  · Check off one box every time your baby moves until you get to 10.  · Write down the time you finished counting in the last column.  · Total how long it took to count up all 10 movements.  · Finally, fill in the box that shows how long this took.  After counting 10 movements, you no longer have to count any more that day.  The next morning, just start counting again as soon as you get up.    What should you call a \"movement\"?  It is hard to say, because it will feel different from one mother to another and from one pregnancy to the next.  The important thing is that you count the movements the same way throughout your pregnancy.  If you have more questions, you should ask your physician.    Count carefully every day!  SAMPLE:  Week 28    How many hours did it take to feel 10 movements?       Start  Time     1     2     3     4     5     6     7     8     9     10   Finish Time   Mon 8:20 ·  ·  ·  ·  ·  ·  ·  ·  ·  ·  11:40   Tue Wed Thu Fri               Sat               Sun                 IMPORTANT: You should contact your physician if it takes more than two hours for you to feel 10 movements.  Each morning, write down the time and start to count the movements of your baby.  Keep track by checking off one box every time you feel one movement.  When you " "have felt 10 \"kicks\", write down the time you finished counting in the last column.  Then fill in the   box (over the check dakota) for the number of hours it took.  Be sure to read the complete instructions on the previous page.            "

## 2022-08-21 ENCOUNTER — OFFICE VISIT (OUTPATIENT)
Dept: URGENT CARE | Facility: CLINIC | Age: 27
End: 2022-08-21
Payer: MEDICAID

## 2022-08-21 VITALS
DIASTOLIC BLOOD PRESSURE: 76 MMHG | WEIGHT: 293 LBS | HEIGHT: 63 IN | BODY MASS INDEX: 51.91 KG/M2 | HEART RATE: 96 BPM | TEMPERATURE: 97.8 F | OXYGEN SATURATION: 98 % | RESPIRATION RATE: 16 BRPM | SYSTOLIC BLOOD PRESSURE: 124 MMHG

## 2022-08-21 DIAGNOSIS — S99.912A LEFT ANKLE INJURY, INITIAL ENCOUNTER: ICD-10-CM

## 2022-08-21 PROCEDURE — 99213 OFFICE O/P EST LOW 20 MIN: CPT

## 2022-08-21 RX ORDER — LISINOPRIL 20 MG/1
20 TABLET ORAL DAILY
COMMUNITY
End: 2023-12-26

## 2022-08-21 RX ORDER — IBUPROFEN 600 MG/1
600 TABLET ORAL EVERY 6 HOURS PRN
Qty: 30 TABLET | Refills: 0 | Status: SHIPPED | OUTPATIENT
Start: 2022-08-21

## 2022-08-21 RX ORDER — METHOCARBAMOL 500 MG/1
1000 TABLET, FILM COATED ORAL 4 TIMES DAILY
COMMUNITY
End: 2023-12-26

## 2022-08-21 RX ORDER — CARIPRAZINE 3 MG/1
CAPSULE, GELATIN COATED ORAL
COMMUNITY
End: 2023-12-26

## 2022-08-21 RX ORDER — KETOROLAC TROMETHAMINE 30 MG/ML
30 INJECTION, SOLUTION INTRAMUSCULAR; INTRAVENOUS ONCE
Status: COMPLETED | OUTPATIENT
Start: 2022-08-21 | End: 2022-08-22

## 2022-08-21 RX ORDER — TOPIRAMATE 50 MG/1
50 TABLET, FILM COATED ORAL 2 TIMES DAILY
COMMUNITY
End: 2023-12-26

## 2022-08-21 ASSESSMENT — FIBROSIS 4 INDEX: FIB4 SCORE: 0.45
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
## 2022-08-22 RX ADMIN — KETOROLAC TROMETHAMINE 30 MG: 30 INJECTION, SOLUTION INTRAMUSCULAR; INTRAVENOUS at 08:32

## 2022-08-23 ENCOUNTER — HOSPITAL ENCOUNTER (OUTPATIENT)
Dept: RADIOLOGY | Facility: MEDICAL CENTER | Age: 27
End: 2022-08-23
Payer: MEDICAID

## 2022-08-23 DIAGNOSIS — S99.912A LEFT ANKLE INJURY, INITIAL ENCOUNTER: ICD-10-CM

## 2022-08-23 PROCEDURE — 73610 X-RAY EXAM OF ANKLE: CPT | Mod: LT

## 2022-08-24 ENCOUNTER — TELEPHONE (OUTPATIENT)
Dept: URGENT CARE | Facility: CLINIC | Age: 27
End: 2022-08-24

## 2023-05-26 ENCOUNTER — HOSPITAL ENCOUNTER (OUTPATIENT)
Dept: RADIOLOGY | Facility: MEDICAL CENTER | Age: 28
End: 2023-05-26
Attending: NURSE PRACTITIONER
Payer: MEDICAID

## 2023-05-26 DIAGNOSIS — M79.672 LEFT FOOT PAIN: ICD-10-CM

## 2023-05-26 PROCEDURE — 73630 X-RAY EXAM OF FOOT: CPT | Mod: LT

## 2023-05-31 NOTE — NON-PROVIDER
Pt here for Depo Injection  LMP 17  Next injection due 17-17    Pt stated she got dizzy while I gave her the injection but it passed right away. I had pt lay down for 15 minutes before letting her go she was fine at that time.  NDC: [address]-0  LOT#: [id number]  Expiration Date: 2019    Dose: 1mL  Site: Left Upper Outer Quadrant Gluteal    Patient educated on use and side effects of medication. Name and  verified prior to injection. Pt tolerated? yes     Verified by ga    Administered by Elsa Ward at 3:13 PM.    Patient Provided Medication: yes             
DISPLAY PLAN FREE TEXT

## 2023-06-14 ENCOUNTER — HOSPITAL ENCOUNTER (EMERGENCY)
Facility: MEDICAL CENTER | Age: 28
End: 2023-06-14
Attending: EMERGENCY MEDICINE
Payer: MEDICAID

## 2023-06-14 VITALS
RESPIRATION RATE: 17 BRPM | SYSTOLIC BLOOD PRESSURE: 156 MMHG | DIASTOLIC BLOOD PRESSURE: 116 MMHG | TEMPERATURE: 97.3 F | OXYGEN SATURATION: 97 % | BODY MASS INDEX: 47.62 KG/M2 | WEIGHT: 268.74 LBS | HEIGHT: 63 IN | HEART RATE: 86 BPM

## 2023-06-14 DIAGNOSIS — F15.10 METHAMPHETAMINE ABUSE (HCC): ICD-10-CM

## 2023-06-14 DIAGNOSIS — F11.90 OPIOID USE DISORDER: ICD-10-CM

## 2023-06-14 PROCEDURE — 99284 EMERGENCY DEPT VISIT MOD MDM: CPT

## 2023-06-14 RX ORDER — BUSPIRONE HYDROCHLORIDE 5 MG/1
5 TABLET ORAL ONCE
Status: DISCONTINUED | OUTPATIENT
Start: 2023-06-14 | End: 2023-06-14

## 2023-06-14 RX ORDER — QUETIAPINE FUMARATE 25 MG/1
50 TABLET, FILM COATED ORAL ONCE
Status: DISCONTINUED | OUTPATIENT
Start: 2023-06-14 | End: 2023-06-14

## 2023-06-15 NOTE — DISCHARGE PLANNING
Alert Team:    Taxi voucher provided for pt to go to [facility] for a voluntary intake evaluation for alcohol detox. Updated RN.

## 2023-06-15 NOTE — ED NOTES
Bedside report from CHUCKY RN. Pt in gown, on gurney and on monitor. Chart up for ERP. Pt states smoked fentayl & meth 2 days ago and is here to detox. Denies any SI or HI but wants to get better.   Equal chest rise and fall bilaterally. Safety measures in place, call light within reach.

## 2023-06-15 NOTE — ED NOTES
Pt educated on the 4 room availability at [facility]. Pt provided with cab voucher. Pt expresses wanting to leave now to get a bed at [facility] before they get occupied. ERP notified and made aware.

## 2023-06-15 NOTE — ED PROVIDER NOTES
ED Provider Note    CHIEF COMPLAINT  Chief Complaint   Patient presents with    Detox     Pt here to detox from fentanyl and methamphetamines. Last use was 2 days ago. Pt requesting to go to [facility]. States using meth makes her feel paranoid. Denies si/hi. Pt here to get help get better.        EXTERNAL RECORDS REVIEWED  Records did not show any hospitalizations since labor and delivery in     HPI/ROS  LIMITATION TO HISTORY   Agitation consistent with mild methamphetamine intoxication  OUTSIDE HISTORIAN(S):      Earnestine Knight is a 28 y.o. female who presents reporting that she would like help detoxing from methamphetamines and fentanyl.  She says that she has not a frequent fentanyl user, but does use methamphetamines fairly heavily.  He has loose was 2 days ago, she says.  She would like to go to [facility].  She reports she does not like how methamphetamines make her feel, describing paranoia, but she denies suicidal or homicidal thoughts.  She would like to get back on her medications, which she says includes Seroquel, BuSpar, lisinopril, Topamax, and Vraylar.  She does not complain of any respiratory or GI illness or fevers or chills.  She is slightly physically agitated, but cooperative.      PAST MEDICAL HISTORY   has a past medical history of Anxiety, Anxiety disorder, Bipolar disorder (HCC), Depression, History of  x 3 needs another csections (2016), Hypertension (), Methamphetamine use ([facility]), last used 2021 (10/1/2021), Migraine, PTSD (post-traumatic stress disorder), SIDS (sudden infant death syndrome) with last baby at 6 weeks of age (2016), and Tobacco dependence says cut down to 5 cigarettes/day (2016).    SURGICAL HISTORY   has a past surgical history that includes primary c section (); repeat c section (); repeat c section (11/3/2015); and repeat c section (2017).    FAMILY HISTORY  Family History   Problem Relation Age of Onset     "Alcohol/Drug Mother         \"Crack\" cocaine    Alcohol/Drug Father     Heart Disease Maternal Aunt 40        heart disease, pacemaker    Diabetes Maternal Grandfather     Cancer Maternal Grandfather         lung cancer       SOCIAL HISTORY  Social History     Tobacco Use    Smoking status: Every Day     Packs/day: 1.00     Years: 9.00     Pack years: 9.00     Types: Cigarettes    Smokeless tobacco: Never    Tobacco comments:     Pt states smoking 1/2 pack a day.   Vaping Use    Vaping Use: Some days    Substances: Nicotine, Flavoring    Devices: Disposable   Substance and Sexual Activity    Alcohol use: No    Drug use: Yes     Types: Methamphetamines     Comment: Last time used 2 days ago 8/15    Sexual activity: Yes     Partners: Male     Birth control/protection: None     Comment: none       CURRENT MEDICATIONS  Home Medications       Reviewed by Lisandra Clinton R.N. (Registered Nurse) on 06/14/23 at 1809  Med List Status: Partial     Medication Last Dose Status   Cariprazine HCl (VRAYLAR) 3 MG Cap  Active   ibuprofen (MOTRIN) 600 MG Tab  Active   lisinopril (PRINIVIL) 20 MG Tab  Active   methocarbamol (ROBAXIN) 500 MG Tab  Active   topiramate (TOPAMAX) 50 MG tablet  Active                    ALLERGIES  Allergies   Allergen Reactions    Strattera [Atomoxetine] Shortness of Breath       PHYSICAL EXAM  VITAL SIGNS: BP (!) 156/116   Pulse 86   Temp 36.3 °C (97.3 °F) (Temporal)   Resp 17   Ht 1.6 m (5' 3\")   Wt 122 kg (268 lb 11.9 oz)   LMP 05/14/2023   SpO2 97%   BMI 47.61 kg/m²   Pulse ox interpretation: I interpret this pulse ox as normal.  Constitutional: Alert in no apparent distress.  HENT: No signs of trauma, Bilateral external ears normal, Nose normal.   Eyes: Pupils are equal and reactive, Conjunctiva normal, Non-icteric.   Neck: Normal range of motion, Supple, No stridor.    Cardiovascular: Normal peripheral perfusion  Thorax & Lungs: Unlabored respirations, equal chest expansion, no accessory muscle " use  Abdomen: Non-distended  Skin:  No erythema, No rash.   Back: Normal alignment and ROM  Extremities: No gross deformity  Musculoskeletal: Good range of motion in all major joints.   Neurologic: Alert, Normal motor function, No focal deficits noted.   Psychiatric: Affect agitated but cooperative, no SI or HI, not psychotic, judgment normal, Mood normal.        COURSE & MEDICAL DECISION MAKING    ED Observation Status? Yes; I am placing the patient in to an observation status due to a diagnostic uncertainty as well as therapeutic intensity. Patient placed in observation status at 5:22 PM, 6/15/2023.     Observation plan is as follows: Restarting of home medications, looking for appropriate disposition to help with substance abuse recovery.    Upon Reevaluation, the patient's condition has: Improved; and will be discharged.    Patient discharged from ED Observation status at 8:00 PM (Time) 6/14/2023 (Date).     INITIAL ASSESSMENT, COURSE AND PLAN  Care Narrative:     5:25 PM patient evaluated the bedside.  She is very clearly requesting assistance with polysubstance abuse recovery.  She is not at risk for opioid withdrawal.  She is slightly agitated, consistent with mild acute methamphetamine intoxication.  She does not require a legal hold.  We will restart home medications, and I will talk to the alert team about whether or not any inpatient recovery beds are available.    8:00 PM there are 4 beds available, and the patient is a vehicle to transport.  We will transport her directly to [facility] by taxi.    HTN/IDDM FOLLOW UP:  The patient is referred to a primary physician for blood pressure management, diabetic screening, and for all other preventive health concerns      ADDITIONAL PROBLEM LIST  Hypertension, polysubstance abuse.  Noncompliance with antihypertensive and mental health medications  DISPOSITION AND DISCUSSIONS    Discussion of management with other QHP or appropriate source(s): Omari  from the alert team, who will arrange transport to [facility].    Escalation of care considered, and ultimately not performed:acute inpatient care management, however at this time, the patient is most appropriate for outpatient management, at [facility] for substance abuse recovery.    Barriers to care at this time, including but not limited to: No established PCP.     Decision tools and prescription drugs considered including, but not limited to: Medication modification , but these should be deferred to her inpatient treatment program .    FINAL DIAGNOSIS  1. Methamphetamine abuse (HCC)    2. Opioid use disorder           Electronically signed by: Javon Galloway M.D., 6/14/2023 7:40 PM

## 2023-06-15 NOTE — ED NOTES
Pt stable for discharge. Pt educated and reviewed discharge instructions with RN. Pt verbalized understanding & all questions were answered. Pt AoX 4. Pt ambulated independently with balanced and steady gait out of the ED with all belongings. Pt encouraged to come back if symptoms worsen.   Cab called Pt to [facility]

## 2023-06-15 NOTE — ED TRIAGE NOTES
Chief Complaint   Patient presents with    Detox     Pt here to detox from fentanyl and methamphetamines. Last use was 2 days ago. Pt requesting to go to [facility]. States using meth makes her feel paranoid. Denies si/hi. Pt here to get help get better.      Calm and cooperative in triage. Educated on triage process. Instructed to notify staff for any worsening symptoms. Denies any recent travel. Denies exposure to known covid positive patients. Denies any respiratory symptoms.

## 2023-07-03 ENCOUNTER — HOSPITAL ENCOUNTER (EMERGENCY)
Facility: MEDICAL CENTER | Age: 28
End: 2023-07-03
Attending: EMERGENCY MEDICINE
Payer: MEDICAID

## 2023-07-03 VITALS
HEIGHT: 63 IN | HEART RATE: 96 BPM | WEIGHT: 273.59 LBS | RESPIRATION RATE: 18 BRPM | DIASTOLIC BLOOD PRESSURE: 105 MMHG | BODY MASS INDEX: 48.48 KG/M2 | OXYGEN SATURATION: 97 % | TEMPERATURE: 98.2 F | SYSTOLIC BLOOD PRESSURE: 155 MMHG

## 2023-07-03 DIAGNOSIS — Z59.00 HOMELESSNESS: ICD-10-CM

## 2023-07-03 DIAGNOSIS — Z86.59 HISTORY OF BIPOLAR DISORDER: ICD-10-CM

## 2023-07-03 DIAGNOSIS — R45.86 EMOTIONAL LABILITY: ICD-10-CM

## 2023-07-03 DIAGNOSIS — F15.10 METHAMPHETAMINE ABUSE (HCC): ICD-10-CM

## 2023-07-03 PROCEDURE — 99284 EMERGENCY DEPT VISIT MOD MDM: CPT

## 2023-07-03 SDOH — ECONOMIC STABILITY - HOUSING INSECURITY: HOMELESSNESS UNSPECIFIED: Z59.00

## 2023-07-03 NOTE — ED TRIAGE NOTES
"Earnestine Jasmina Hernandezham  28 y.o. female  Chief Complaint   Patient presents with    Psych Eval     Pt states, \"[facility] medically discharged me and they want psych eval.\" Denies SI/HI       Pt amb to triage with steady gait for above complaint. Denies SI/HI.  Hx of SI with plan  Pt is alert and oriented, speaking in full sentences, follows commands and responds appropriately to questions. Not in any apparent distress. Respirations are even and unlabored.  Pt placed in lobby. Pt educated on triage process. Pt encouraged to alert staff for any changes.    "

## 2023-07-03 NOTE — ED PROVIDER NOTES
"ED Provider Note    CHIEF COMPLAINT  Chief Complaint   Patient presents with    Psych Eval     Pt states, \"[facility] medically discharged me and they want psych eval.\" Denies SI/HI       HPI/ROS    Earnestine Knight is a 28 y.o. female who presents requesting psychiatric resources.  This was dismissed from step [facility], recently got out of assisted.  History of methamphetamine abuse.  States that she also has a history of suicidal ideation but has no suicidal or homicidal thoughts at this time.  She states that she has no physical complaints.  She is history of bipolar disorder and has medication.  She has been treated at our  outpatient before and that worked really well.  No other complaints    PAST MEDICAL HISTORY   has a past medical history of Anxiety, Anxiety disorder, Bipolar disorder (HCC), Depression, History of  x 3 needs another csections (2016), Hypertension (), Methamphetamine use (HCC), last used 2021 (10/1/2021), Migraine, PTSD (post-traumatic stress disorder), SIDS (sudden infant death syndrome) with last baby at 6 weeks of age (2016), and Tobacco dependence says cut down to 5 cigarettes/day (2016).    SURGICAL HISTORY   has a past surgical history that includes primary c section (); repeat c section (); repeat c section (11/3/2015); and repeat c section (2017).    FAMILY HISTORY  Family History   Problem Relation Age of Onset    Alcohol/Drug Mother         \"Crack\" cocaine    Alcohol/Drug Father     Heart Disease Maternal Aunt 40        heart disease, pacemaker    Diabetes Maternal Grandfather     Cancer Maternal Grandfather         lung cancer       SOCIAL HISTORY  Social History     Tobacco Use    Smoking status: Every Day     Packs/day: 1.00     Years: 9.00     Pack years: 9.00     Types: Cigarettes    Smokeless tobacco: Never    Tobacco comments:     Pt states smoking 1/2 pack a day.   Vaping Use    Vaping Use: Some days    Substances: Nicotine, " "Flavoring    Devices: Disposable   Substance and Sexual Activity    Alcohol use: No    Drug use: Yes     Types: Methamphetamines     Comment: Last time used 2 days ago 8/15    Sexual activity: Yes     Partners: Male     Birth control/protection: None     Comment: none       CURRENT MEDICATIONS  Home Medications    **Home medications have not yet been reviewed for this encounter**         ALLERGIES  Allergies   Allergen Reactions    Strattera [Atomoxetine] Shortness of Breath       PHYSICAL EXAM  VITAL SIGNS: BP (!) 155/105   Pulse 96   Temp 36.8 °C (98.2 °F) (Oral)   Resp 18   Ht 1.6 m (5' 3\")   Wt 124 kg (273 lb 9.5 oz)   LMP 05/14/2023   SpO2 97%   BMI 48.46 kg/m²    Constitutional: Alert in no apparent distress.  HENT: No signs of significant acute trauma.   Eyes: Conjunctiva normal, non-icteric.   Chest: Normal nonlabored respirations  Skin: No appreciable rash on the exposed skin  Musculoskeletal: No obvious acute trauma appreciated  Neurologic: Alert, no obvious focal deficits noted.        COURSE & MEDICAL DECISION MAKING    ED Observation Status? No; Patient does not meet criteria for ED Observation.     INITIAL ASSESSMENT, COURSE AND PLAN  Care Narrative: 28-year-old female with psychiatric disease, coming in requesting psychiatric resources.  Just got out of custodial recently, relapsed on meth.  No suicidal homicidal ideation.  Has medications.  Discussed the case with [facility] team, will help get her resources with [facility], this may have included transportation.  Discharged in stable condition    DISPOSITION AND DISCUSSIONS    Discussion of management with other [facility] or appropriate source(s): Behavioral Health        Escalation of care considered, and ultimately not performed: Patient does not meet criteria for legal hold or acute psychiatric hospitalization    Barriers to care at this time, including but not limited to: Patient is homeless.     Decision tools and prescription drugs considered " including, but not limited to: Considered adjustment of the psychiatric medications although we will get her into outpatient psychiatric services where they can adjust medications if needed.    FINAL DIAGNOSIS  1. Methamphetamine abuse (HCC)    2. Homelessness    3. History of bipolar disorder    4. Emotional lability           Electronically signed by: Norbert Alvarez M.D., 7/3/2023 1:42 PM

## 2023-07-03 NOTE — DISCHARGE PLANNING
Alert Team:    PT came to ED seeking psyc eval, however is in need of outpatient services. Pt denies any SI/HI and is very pleasant. Writer spoke to Jen at [facility] and was pt is able to come in as a walk-in and receive services. PT elected to walk across the street to [facility]. Pt also given pscy/MH resources. No further needs.    Omari Quintana, RN, JAGDISH

## 2023-07-03 NOTE — ED NOTES
.atient discharged per order. Oral and written discharge instructions reviewed. All belongings accounted for and taken with patient. Questions answered, and patient agrees with discharge plan. Encouraged to follow up with PCP. Given resources by alert team rn.

## 2023-09-02 ENCOUNTER — HOSPITAL ENCOUNTER (EMERGENCY)
Facility: MEDICAL CENTER | Age: 28
End: 2023-09-03
Attending: EMERGENCY MEDICINE
Payer: MEDICAID

## 2023-09-02 DIAGNOSIS — F11.90 OPIATE USE: ICD-10-CM

## 2023-09-02 DIAGNOSIS — L03.90 CELLULITIS, UNSPECIFIED CELLULITIS SITE: ICD-10-CM

## 2023-09-02 PROCEDURE — 99284 EMERGENCY DEPT VISIT MOD MDM: CPT

## 2023-09-03 VITALS
SYSTOLIC BLOOD PRESSURE: 145 MMHG | OXYGEN SATURATION: 98 % | BODY MASS INDEX: 45.74 KG/M2 | RESPIRATION RATE: 15 BRPM | DIASTOLIC BLOOD PRESSURE: 97 MMHG | TEMPERATURE: 98.2 F | HEART RATE: 87 BPM | WEIGHT: 258.16 LBS | HEIGHT: 63 IN

## 2023-09-03 PROCEDURE — 700111 HCHG RX REV CODE 636 W/ 250 OVERRIDE (IP): Mod: UD | Performed by: EMERGENCY MEDICINE

## 2023-09-03 PROCEDURE — A9270 NON-COVERED ITEM OR SERVICE: HCPCS | Mod: UD | Performed by: EMERGENCY MEDICINE

## 2023-09-03 PROCEDURE — 700102 HCHG RX REV CODE 250 W/ 637 OVERRIDE(OP): Mod: UD | Performed by: EMERGENCY MEDICINE

## 2023-09-03 RX ORDER — CEPHALEXIN 500 MG/1
500 CAPSULE ORAL ONCE
Status: COMPLETED | OUTPATIENT
Start: 2023-09-03 | End: 2023-09-03

## 2023-09-03 RX ORDER — BUPRENORPHINE HYDROCHLORIDE AND NALOXONE HYDROCHLORIDE DIHYDRATE 8; 2 MG/1; MG/1
2 TABLET SUBLINGUAL DAILY
Qty: 6 TABLET | Refills: 0 | Status: SHIPPED | OUTPATIENT
Start: 2023-09-03 | End: 2023-09-06

## 2023-09-03 RX ORDER — BUPRENORPHINE HYDROCHLORIDE AND NALOXONE HYDROCHLORIDE DIHYDRATE 8; 2 MG/1; MG/1
1 TABLET SUBLINGUAL ONCE
Status: COMPLETED | OUTPATIENT
Start: 2023-09-03 | End: 2023-09-03

## 2023-09-03 RX ADMIN — CEPHALEXIN 500 MG: 500 CAPSULE ORAL at 00:46

## 2023-09-03 RX ADMIN — BUPRENORPHINE HYDROCHLORIDE AND NALOXONE HYDROCHLORIDE DIHYDRATE 1 TABLET: 8; 2 TABLET SUBLINGUAL at 00:46

## 2023-09-03 NOTE — ED TRIAGE NOTES
Earnestine Jasminataj Knight  28 y.o. female  Chief Complaint   Patient presents with    Drug Withdrawal     Report to be withdrawing from meth or fentanyl    Wound Check     Has been picking wound on multiple areas of her body. Denies IV drug use. Right arm wound appears inflamed.      Pt ambulatory to triage with steady gait for above complaint. Adán SI/HI. Reports last drug use was yesterday.      Pt is GCS 15, speaking in full sentences, follows commands and responds appropriately to questions. Resp are even and unlabored. Patient denies pain.     Pt placed in ED lobby. Pt educated on triage process. Pt encouraged to alert staff for any changes.       Vitals:    09/02/23 2250   BP: (!) 175/105   Pulse: 99   Resp: 18   Temp: 36.9 °C (98.5 °F)   SpO2: 99%

## 2023-09-03 NOTE — ED NOTES
Pt attempted to spit out medication. RN out it back in her mouth. RN at bedside. Pt agreed to take medication and give her a warm meal. Will give he food once medication is dissolved.

## 2023-09-03 NOTE — ED PROVIDER NOTES
"ED Provider Note    CHIEF COMPLAINT  Chief Complaint   Patient presents with    Drug Withdrawal     Report to be withdrawing from meth or fentanyl    Wound Check     Has been picking wound on multiple areas of her body. Denies IV drug use. Right arm wound appears inflamed.        EXTERNAL RECORDS REVIEWED  Outpatient Notes Patient seen on July 3 for methamphetamine abuse.    HPI/ROS  LIMITATION TO HISTORY   Select: Behavior  OUTSIDE HISTORIAN(S):  none    Earnestine Knight is a 28 y.o. female who presents patient presents after using amphetamines and fentanyl.  She has been picking at multiple areas on her body that are very red.  She denies any fevers or chills.  She denies any nausea or vomiting.  She denies any chest pain.  Concerned about the wounds.  Patient also mentions that she has been using fentanyl now feels like she is in withdrawal.    PAST MEDICAL HISTORY   has a past medical history of Anxiety, Anxiety disorder, Bipolar disorder (HCC), Depression, History of  x 3 needs another csections (2016), Hypertension (), Methamphetamine use ([facility]), last used 2021 (10/1/2021), Migraine, PTSD (post-traumatic stress disorder), SIDS (sudden infant death syndrome) with last baby at 6 weeks of age (2016), and Tobacco dependence says cut down to 5 cigarettes/day (2016).    SURGICAL HISTORY   has a past surgical history that includes primary c section (); repeat c section (); repeat c section (11/3/2015); and repeat c section (2017).    FAMILY HISTORY  Family History   Problem Relation Age of Onset    Alcohol/Drug Mother         \"Crack\" cocaine    Alcohol/Drug Father     Heart Disease Maternal Aunt 40        heart disease, pacemaker    Diabetes Maternal Grandfather     Cancer Maternal Grandfather         lung cancer       SOCIAL HISTORY  Social History     Tobacco Use    Smoking status: Every Day     Current packs/day: 1.00     Average packs/day: 1 pack/day for 9.0 " "years (9.0 ttl pk-yrs)     Types: Cigarettes    Smokeless tobacco: Never    Tobacco comments:     Pt states smoking 1/2 pack a day.   Vaping Use    Vaping Use: Some days    Substances: Nicotine, Flavoring    Devices: Disposable   Substance and Sexual Activity    Alcohol use: No    Drug use: Yes     Types: Methamphetamines     Comment: Last time used 2 days ago 8/15    Sexual activity: Yes     Partners: Male     Birth control/protection: None     Comment: none       CURRENT MEDICATIONS  Home Medications       Reviewed by Nirmala Mcdonald R.N. (Registered Nurse) on 09/02/23 at 2326  Med List Status: Partial     Medication Last Dose Status   Cariprazine HCl (VRAYLAR) 3 MG Cap  Active   ibuprofen (MOTRIN) 600 MG Tab  Active   lisinopril (PRINIVIL) 20 MG Tab  Active   methocarbamol (ROBAXIN) 500 MG Tab  Active   topiramate (TOPAMAX) 50 MG tablet  Active                    ALLERGIES  Allergies   Allergen Reactions    Strattera [Atomoxetine] Shortness of Breath       PHYSICAL EXAM  VITAL SIGNS: BP (!) 145/97   Pulse 87   Temp 36.8 °C (98.2 °F) (Temporal)   Resp 15   Ht 1.6 m (5' 3\")   Wt 117 kg (258 lb 2.5 oz)   LMP 08/01/2023 (Approximate)   SpO2 98%   BMI 45.73 kg/m²    Constitutional: Well developed, Well nourished, No acute distress, Non-toxic appearance.  Mildly anxious  HEENT: No swelling of the lips, tongue or mouth. No stridor. Eyes normal inspection. No mucous membrane lesions.  Cardiovascular: Normal heart rate, Normal rhythm.  Symmetric peripheral pulses.   Thorax & Lungs: No respiratory distress, No wheezing, No rales, No rhonchi, No chest tenderness.   Skin: Multiple scratches noted all over her body with some areas of redness.  No purulent drainage noted  Extremities: Well-perfused, as above      DIAGNOSTIC STUDIES / PROCEDURES          COURSE & MEDICAL DECISION MAKING    ED Observation Status? No; Patient does not meet criteria for ED Observation.     INITIAL ASSESSMENT, COURSE AND PLAN  Care " Narrative: Patient was coming down off amphetamines as well as fentanyl.  Patient does have infections noted on her body from scratching.  She will need Keflex.  Given the fact she does appear to be in withdrawal I will treat the patient with buprenorphine.  I had pharmacy, see here.  We will discharge her home with strict return precautions and follow-up.  There is no evidence of sepsis.          ADDITIONAL PROBLEM LIST    DISPOSITION AND DISCUSSIONS  I have discussed management of the patient with the following physicians and DEIDRA's:  none    Discussion of management with other [facility] or appropriate source(s): Pharmacy spoke to the patient naloxone and Suboxone      Escalation of care considered, and ultimately not performed:acute inpatient care management, however at this time, the patient is most appropriate for outpatient management    Barriers to care at this time, including but not limited to: Patient does not have established PCP.     Decision tools and prescription drugs considered including, but not limited to: Pain Medications suboxone and to treat opioid use .    FINAL DIAGNOSIS  1. Opiate use    2. Cellulitis, unspecified cellulitis site           Electronically signed by: Chandrakant Mejia M.D., 9/3/2023 12:43 AM

## 2023-09-03 NOTE — ED NOTES
Naloxone 4 mg/0.1 mL nasal spray was dispensed to the patient for prevention of potential opioid related overdose per protocol.   Counseling was provided regarding:  - Information relating to the recognition, prevention and responses to opioid-related drug overdoses  - How to safely administer the naloxone nasal spray  - Potential side effects and adverse events related to the naloxone nasal spray  - The importance of seeking immediate emergency medical assistance for a person experiencing an opioid-related drug overdose, even after the administration of naloxone  - The immunity from certain civil or criminal liabilities for seeking medical assistance for a person experiencing an opioid-related overdose        Fidelia Collazo, PharmD

## 2023-09-10 ENCOUNTER — HOSPITAL ENCOUNTER (EMERGENCY)
Facility: MEDICAL CENTER | Age: 28
End: 2023-09-10
Attending: EMERGENCY MEDICINE
Payer: MEDICAID

## 2023-09-10 VITALS
OXYGEN SATURATION: 95 % | WEIGHT: 255.73 LBS | TEMPERATURE: 98.9 F | DIASTOLIC BLOOD PRESSURE: 95 MMHG | RESPIRATION RATE: 16 BRPM | BODY MASS INDEX: 45.3 KG/M2 | SYSTOLIC BLOOD PRESSURE: 148 MMHG | HEART RATE: 93 BPM

## 2023-09-10 DIAGNOSIS — L03.90 CELLULITIS, UNSPECIFIED CELLULITIS SITE: ICD-10-CM

## 2023-09-10 PROCEDURE — 700102 HCHG RX REV CODE 250 W/ 637 OVERRIDE(OP): Mod: UD | Performed by: EMERGENCY MEDICINE

## 2023-09-10 PROCEDURE — 99283 EMERGENCY DEPT VISIT LOW MDM: CPT

## 2023-09-10 PROCEDURE — A9270 NON-COVERED ITEM OR SERVICE: HCPCS | Mod: UD | Performed by: EMERGENCY MEDICINE

## 2023-09-10 RX ORDER — CEFTRIAXONE 2 G/1
2000 INJECTION, POWDER, FOR SOLUTION INTRAMUSCULAR; INTRAVENOUS ONCE
Status: DISCONTINUED | OUTPATIENT
Start: 2023-09-10 | End: 2023-09-10

## 2023-09-10 RX ORDER — CEPHALEXIN 500 MG/1
500 CAPSULE ORAL ONCE
Status: COMPLETED | OUTPATIENT
Start: 2023-09-10 | End: 2023-09-10

## 2023-09-10 RX ORDER — IBUPROFEN 600 MG/1
600 TABLET ORAL ONCE
Status: COMPLETED | OUTPATIENT
Start: 2023-09-10 | End: 2023-09-10

## 2023-09-10 RX ORDER — ACETAMINOPHEN 325 MG/1
650 TABLET ORAL ONCE
Status: COMPLETED | OUTPATIENT
Start: 2023-09-10 | End: 2023-09-10

## 2023-09-10 RX ORDER — CEPHALEXIN 500 MG/1
500 CAPSULE ORAL 4 TIMES DAILY
Qty: 28 CAPSULE | Refills: 0 | Status: ACTIVE | OUTPATIENT
Start: 2023-09-10 | End: 2023-09-17

## 2023-09-10 RX ADMIN — CEPHALEXIN 500 MG: 500 CAPSULE ORAL at 10:37

## 2023-09-10 RX ADMIN — IBUPROFEN 600 MG: 600 TABLET ORAL at 10:38

## 2023-09-10 RX ADMIN — ACETAMINOPHEN 650 MG: 325 TABLET, FILM COATED ORAL at 10:37

## 2023-09-10 ASSESSMENT — PAIN DESCRIPTION - PAIN TYPE: TYPE: ACUTE PAIN

## 2023-09-10 NOTE — ED TRIAGE NOTES
Earnestine Knight  28 y.o. female  Chief Complaint   Patient presents with    Open Wound     Pt has a number of open wounds throughout her body.       Pt amb to triage with steady gait for above complaint. Pt seen here on 9/2 for the same.  Pt is alert and oriented, speaking in full sentences, follows commands and responds appropriately to questions. Not in any apparent distress. Respirations are even and unlabored.  Pt placed in lobby. Pt educated on triage process. Pt encouraged to alert staff for any changes.

## 2023-09-10 NOTE — ED NOTES
Patient ambulated to yellow 60 without incident. Primary assessment complete. Patient oriented to care area. Chart up for ERP.

## 2023-09-10 NOTE — ED PROVIDER NOTES
"ED Provider Note    CHIEF COMPLAINT  Chief Complaint   Patient presents with    Open Wound     Pt has a number of open wounds throughout her body.       EXTERNAL RECORDS REVIEWED  Other patient seen here earlier in the month for skin lesions. Prescribed keflex    HPI/ROS  LIMITATION TO HISTORY   Select: : None  OUTSIDE HISTORIAN(S):  None    Earnestine Knight is a 28 y.o. female who presents with erythematous lesions over her entire body that have been present for months. She admits to drug use and notes that she picks at the scabs frequently. She was seen for the lesions earlier this month, prescribed antibiotics, unclear if she took them to completion. She has no fever, history of diabetes, and is not immunosuppressed. She uses methamphetamines but denies IV drug use or skin popping. The area on her right arm is red but she notes it is improved from yesterday. She is currently undomiciled but is planning to go back to [facility].    PAST MEDICAL HISTORY   has a past medical history of Anxiety, Anxiety disorder, Bipolar disorder (HCC), Depression, History of  x 3 needs another csections (2016), Hypertension (), Methamphetamine use ([facility]), last used 2021 (10/1/2021), Migraine, PTSD (post-traumatic stress disorder), SIDS (sudden infant death syndrome) with last baby at 6 weeks of age (2016), and Tobacco dependence says cut down to 5 cigarettes/day (2016).    SURGICAL HISTORY   has a past surgical history that includes primary c section (); repeat c section (); repeat c section (11/3/2015); and repeat c section (2017).    FAMILY HISTORY  Family History   Problem Relation Age of Onset    Alcohol/Drug Mother         \"Crack\" cocaine    Alcohol/Drug Father     Heart Disease Maternal Aunt 40        heart disease, pacemaker    Diabetes Maternal Grandfather     Cancer Maternal Grandfather         lung cancer       SOCIAL HISTORY  Social History     Tobacco Use    Smoking " status: Every Day     Current packs/day: 1.00     Average packs/day: 1 pack/day for 9.0 years (9.0 ttl pk-yrs)     Types: Cigarettes    Smokeless tobacco: Never    Tobacco comments:     Pt states smoking 1/2 pack a day.   Vaping Use    Vaping Use: Some days    Substances: Nicotine, Flavoring    Devices: Disposable   Substance and Sexual Activity    Alcohol use: No    Drug use: Yes     Types: Methamphetamines     Comment: Last time used 2 days ago 8/15    Sexual activity: Yes     Partners: Male     Birth control/protection: None     Comment: none       CURRENT MEDICATIONS  Home Medications       Reviewed by Claudia Hunt R.N. (Registered Nurse) on 09/10/23 at 0932  Med List Status: Not Addressed     Medication Last Dose Status   Cariprazine HCl (VRAYLAR) 3 MG Cap  Active   ibuprofen (MOTRIN) 600 MG Tab  Active   lisinopril (PRINIVIL) 20 MG Tab  Active   methocarbamol (ROBAXIN) 500 MG Tab  Active   topiramate (TOPAMAX) 50 MG tablet  Active                    ALLERGIES  Allergies   Allergen Reactions    Strattera [Atomoxetine] Shortness of Breath       PHYSICAL EXAM  VITAL SIGNS: BP (!) 157/103   Pulse (!) 102   Temp 35.9 °C (96.7 °F) (Temporal)   Resp 18   Wt 116 kg (255 lb 11.7 oz)   LMP 08/01/2023 (Approximate)   SpO2 99%   BMI 45.30 kg/m²    Physical Exam  Vitals and nursing note reviewed.   Constitutional:       Appearance: Normal appearance.   HENT:      Head: Normocephalic and atraumatic.      Right Ear: External ear normal.      Left Ear: External ear normal.      Nose: Nose normal.      Mouth/Throat:      Mouth: Mucous membranes are moist.      Pharynx: Oropharynx is clear.   Eyes:      Extraocular Movements: Extraocular movements intact.      Conjunctiva/sclera: Conjunctivae normal.      Pupils: Pupils are equal, round, and reactive to light.   Cardiovascular:      Comments: Warm and well perfused  Pulmonary:      Effort: Pulmonary effort is normal.   Musculoskeletal:         General: Normal range of  motion.      Cervical back: Normal range of motion and neck supple.   Skin:     General: Skin is warm and dry.      Comments: Too numerous to count scabbed lesions over trunk and extremities in various stages of healing. Area over right forearm has a large area of warmth and erythema but no fluctuance or drainage. No crepitus.    Neurological:      General: No focal deficit present.      Mental Status: She is alert and oriented to person, place, and time.   Psychiatric:         Mood and Affect: Mood normal.         Behavior: Behavior normal.           DIAGNOSTIC STUDIES / PROCEDURES  RADIOLOGY  Radiologist interpretation:   No orders to display     COURSE & MEDICAL DECISION MAKING    ED Observation Status? No; Patient does not meet criteria for ED Observation.     INITIAL ASSESSMENT, COURSE AND PLAN  Care Narrative: This is a 28 year old undomiciled female who is here with scabbed lesions over the entirety of her body likely due to her amphetamine use and persistent picking. She arrives slightly tachycardic but AF with otherwise normal VS. She appears well hydrated and non-toxic. There are no mucosal lesions or skin sloughing. Low suspicion for etiologies such as SJS or TEN. She has numerous lesions over her trunk and extremities some with surrounding erythema. There is no purulent drainage or fluctuance to suggest abscess or complicated SSI. The area on her right forearm appears the worst but there is no erythematous streaking to suggest lymphangitis, fluctuance to suggest abscess, drainage, crepitus, or pain out of proportion to exam to suggest NSTI. Will plan on keflex again as it is unclear if she actually took the first course. She is currently undomiciled but is planning to go back to [facility]. She tells me she can afford her medications. Given a dose of keflex, tylenol, and ibuprofen. She was discharged in good and stable condition with strict return precautions.    ADDITIONAL PROBLEM LIST  None  DISPOSITION  AND DISCUSSIONS  I have discussed management of the patient with the following physicians and DEIDRA's:  None    Discussion of management with other [facility] or appropriate source(s): None     Escalation of care considered, and ultimately not performed:IV fluids, blood analysis, diagnostic imaging, and acute inpatient care management, however at this time, the patient is most appropriate for outpatient management    Barriers to care at this time, including but not limited to: Patient does not have established PCP and Patient is homeless.     Decision tools and prescription drugs considered including, but not limited to: Antibiotics -keflex .    FINAL DIAGNOSIS  1. Cellulitis, unspecified cellulitis site      Electronically signed by: Garcia Barrios M.D., 9/10/2023 9:54 AM

## 2023-09-10 NOTE — DISCHARGE INSTRUCTIONS
I have given you a prescription for antibiotics, please take them as directed.  It is important that you establish with a primary care physician, I have given you a list of local clinics where you can do so.  Please call 1 tomorrow to schedule an appointment.  Return immediately to the ER if you develop new or worsening symptoms.  I hope you feel better soon!

## 2023-09-10 NOTE — ED NOTES
Patient revitaled. Discharge orders received. Discharge instructions provided by ERP. AVS provided and reviewed with patient. Patient AOx4 and ambulatory. No IV placed during ED visit. Patient discharged to self without incident.

## 2023-12-08 ENCOUNTER — OFFICE VISIT (OUTPATIENT)
Dept: URGENT CARE | Facility: CLINIC | Age: 28
End: 2023-12-08
Payer: MEDICAID

## 2023-12-08 VITALS
HEIGHT: 63 IN | TEMPERATURE: 98.2 F | BODY MASS INDEX: 41.83 KG/M2 | DIASTOLIC BLOOD PRESSURE: 82 MMHG | HEART RATE: 109 BPM | OXYGEN SATURATION: 99 % | RESPIRATION RATE: 20 BRPM | WEIGHT: 236.1 LBS | SYSTOLIC BLOOD PRESSURE: 118 MMHG

## 2023-12-08 DIAGNOSIS — J02.0 STREP PHARYNGITIS: Primary | ICD-10-CM

## 2023-12-08 DIAGNOSIS — J02.9 PHARYNGITIS, UNSPECIFIED ETIOLOGY: ICD-10-CM

## 2023-12-08 LAB — S PYO DNA SPEC NAA+PROBE: DETECTED

## 2023-12-08 PROCEDURE — 3074F SYST BP LT 130 MM HG: CPT | Performed by: PHYSICIAN ASSISTANT

## 2023-12-08 PROCEDURE — 87651 STREP A DNA AMP PROBE: CPT | Performed by: PHYSICIAN ASSISTANT

## 2023-12-08 PROCEDURE — 3079F DIAST BP 80-89 MM HG: CPT | Performed by: PHYSICIAN ASSISTANT

## 2023-12-08 PROCEDURE — 99213 OFFICE O/P EST LOW 20 MIN: CPT | Performed by: PHYSICIAN ASSISTANT

## 2023-12-08 RX ORDER — IBUPROFEN 800 MG/1
800 TABLET ORAL EVERY 8 HOURS PRN
Qty: 30 TABLET | Refills: 0 | Status: SHIPPED | OUTPATIENT
Start: 2023-12-08

## 2023-12-08 RX ORDER — DEXAMETHASONE SODIUM PHOSPHATE 10 MG/ML
10 INJECTION INTRAMUSCULAR; INTRAVENOUS ONCE
Status: COMPLETED | OUTPATIENT
Start: 2023-12-08 | End: 2023-12-08

## 2023-12-08 RX ORDER — TRAZODONE HYDROCHLORIDE 100 MG/1
100 TABLET ORAL NIGHTLY
COMMUNITY

## 2023-12-08 RX ORDER — ARIPIPRAZOLE 10 MG/1
10 TABLET ORAL DAILY
COMMUNITY

## 2023-12-08 RX ORDER — BUSPIRONE HYDROCHLORIDE 10 MG/1
10 TABLET ORAL 2 TIMES DAILY
COMMUNITY

## 2023-12-08 RX ORDER — AMOXICILLIN AND CLAVULANATE POTASSIUM 875; 125 MG/1; MG/1
1 TABLET, FILM COATED ORAL 2 TIMES DAILY
Qty: 20 TABLET | Refills: 0 | Status: SHIPPED | OUTPATIENT
Start: 2023-12-08 | End: 2023-12-18

## 2023-12-08 RX ADMIN — DEXAMETHASONE SODIUM PHOSPHATE 10 MG: 10 INJECTION INTRAMUSCULAR; INTRAVENOUS at 10:45

## 2023-12-08 ASSESSMENT — ENCOUNTER SYMPTOMS
CHILLS: 0
SHORTNESS OF BREATH: 0
NAUSEA: 0
SORE THROAT: 1
CONSTIPATION: 0
DIARRHEA: 0
COUGH: 1
MYALGIAS: 0
HEADACHES: 0
ABDOMINAL PAIN: 0
VOMITING: 0
FEVER: 0
EYE PAIN: 0

## 2023-12-08 NOTE — PATIENT INSTRUCTIONS
Replace toothbrush after 2 days  Use ibuprofen prescription 3 times day as needed for pain  Antibiotics twice daily for 10 days  Can gargle with salt water or use honey as well  Focus on hydration, go to er if not able to stay hydrated

## 2023-12-08 NOTE — PROGRESS NOTES
"Subjective:   Earnestine Knight is a 28 y.o. female who presents for Pharyngitis (Sore throat, swollen tonsils, and very painful x 2 days. The patient stated she hasn't been able to eat anything because of the pain.)      28-year-old female presents to urgent care noting a 3-day history that began with mild sore throat, sore throat is progressed and has been quite painful.  She has not taken any Tylenol or ibuprofen but reports that she has been unable to eat food and has had pain on swallowing so has not had any liquids for the last 12 hours.  She has had decreased urine output.  She has not noted any fevers but does endorse occasional runny nose and cough.  She has not noted any otalgia.    Review of Systems   Constitutional:  Positive for malaise/fatigue. Negative for chills and fever.   HENT:  Positive for congestion and sore throat. Negative for ear pain.    Eyes:  Negative for pain.   Respiratory:  Positive for cough. Negative for shortness of breath.    Cardiovascular:  Negative for chest pain.   Gastrointestinal:  Negative for abdominal pain, constipation, diarrhea, nausea and vomiting.   Genitourinary:  Negative for dysuria.   Musculoskeletal:  Negative for myalgias.   Skin:  Negative for rash.   Neurological:  Negative for headaches.       Medications, Allergies, and current problem list reviewed today in Epic.     Objective:     /82 (BP Location: Left arm, Patient Position: Sitting, BP Cuff Size: Adult)   Pulse (!) 109   Temp 36.8 °C (98.2 °F) (Temporal)   Resp 20   Ht 1.6 m (5' 3\")   Wt 107 kg (236 lb 1.6 oz)   SpO2 99%     Physical Exam  Vitals reviewed.   Constitutional:       Appearance: Normal appearance. She is not toxic-appearing.   HENT:      Head: Normocephalic and atraumatic.      Right Ear: Tympanic membrane, ear canal and external ear normal.      Left Ear: Tympanic membrane, ear canal and external ear normal.      Nose: Rhinorrhea present.      Mouth/Throat:      Mouth: " Mucous membranes are moist.      Comments: Erythematous oropharynx with 3+ symmetrical tonsils midline uvula patent airway tolerating secretions no hoarse or muffled phonation  Eyes:      Conjunctiva/sclera: Conjunctivae normal.      Pupils: Pupils are equal, round, and reactive to light.   Cardiovascular:      Rate and Rhythm: Normal rate and regular rhythm.   Pulmonary:      Effort: Pulmonary effort is normal.      Breath sounds: Normal breath sounds.   Musculoskeletal:      Cervical back: Normal range of motion.   Lymphadenopathy:      Cervical: Cervical adenopathy present.   Skin:     General: Skin is warm and dry.      Capillary Refill: Capillary refill takes less than 2 seconds.   Neurological:      Mental Status: She is alert and oriented to person, place, and time.         Assessment/Plan:     Diagnosis and associated orders:     1. Strep pharyngitis  amoxicillin-clavulanate (AUGMENTIN) 875-125 MG Tab    ibuprofen (MOTRIN) 800 MG Tab    dexamethasone (Decadron) injection (check route below) 10 mg      2. Pharyngitis, unspecified etiology  POCT GROUP A STREP, PCR         Comments/MDM:       Rapid POC Strep testing POSITIVE  Management includes completion of antibiotics, new toothbrush after day 3 of antibiotics, discarding/sanitizing any other dental equipment, soft foods, increased fluids, remain home from school for 24 hours.   Management of symptoms is discussed and expected course is outlined.   Patient instructed to return to office in 24-48 hours if not improving  Patient instructed to present to Emergency Room if notes unilateral swelling, muffled voice, difficulty handling secretions  I considered other causes of pharyngitis including Group C, G strep, peritonsillar abscess, eusebia's angina, and retropharyngeal abscess but the patient's reported symptoms and my exam do not support these alternative diagnosis based on information I have available today.  This may change and I encouraged the patient to  return to clinic if they are experiencing new symptoms or their symptoms fail to resolve with time as we cannot rule out all pathology from a single Urgent Care visit.            Differential diagnosis, natural history, supportive care, and indications for immediate follow-up discussed.    Advised the patient to follow-up with the primary care physician for recheck, reevaluation, and consideration of further management.    Please note that this dictation was created using voice recognition software. I have made a reasonable attempt to correct obvious errors, but I expect that there are errors of grammar and possibly content that I did not discover before finalizing the note.    This note was electronically signed by Tristan Martinez PA-C

## 2023-12-26 ENCOUNTER — OFFICE VISIT (OUTPATIENT)
Dept: URGENT CARE | Facility: CLINIC | Age: 28
End: 2023-12-26
Payer: MEDICAID

## 2023-12-26 VITALS
RESPIRATION RATE: 16 BRPM | HEIGHT: 63 IN | BODY MASS INDEX: 43.59 KG/M2 | HEART RATE: 84 BPM | OXYGEN SATURATION: 96 % | WEIGHT: 246 LBS | TEMPERATURE: 98.3 F

## 2023-12-26 DIAGNOSIS — J22 LRTI (LOWER RESPIRATORY TRACT INFECTION): ICD-10-CM

## 2023-12-26 PROCEDURE — 99214 OFFICE O/P EST MOD 30 MIN: CPT | Performed by: PHYSICIAN ASSISTANT

## 2023-12-26 RX ORDER — BENZONATATE 100 MG/1
100 CAPSULE ORAL 3 TIMES DAILY PRN
Qty: 30 CAPSULE | Refills: 0 | Status: SHIPPED | OUTPATIENT
Start: 2023-12-26

## 2023-12-26 RX ORDER — METHYLPREDNISOLONE 4 MG/1
TABLET ORAL
Qty: 21 TABLET | Refills: 0 | Status: SHIPPED | OUTPATIENT
Start: 2023-12-26

## 2023-12-26 RX ORDER — ALBUTEROL SULFATE 90 UG/1
2 AEROSOL, METERED RESPIRATORY (INHALATION) EVERY 6 HOURS PRN
Qty: 8.5 G | Refills: 0 | Status: SHIPPED | OUTPATIENT
Start: 2023-12-26

## 2023-12-26 RX ORDER — DOXYCYCLINE HYCLATE 100 MG
100 TABLET ORAL 2 TIMES DAILY
Qty: 14 TABLET | Refills: 0 | Status: SHIPPED | OUTPATIENT
Start: 2023-12-26

## 2023-12-26 ASSESSMENT — ENCOUNTER SYMPTOMS
DIARRHEA: 0
WHEEZING: 1
FEVER: 0
ABDOMINAL PAIN: 0
CHILLS: 0
HEADACHES: 0
DIZZINESS: 0
VOMITING: 0
SINUS PAIN: 1
SHORTNESS OF BREATH: 1
SPUTUM PRODUCTION: 1
COUGH: 1
RHINORRHEA: 1
NAUSEA: 0
MYALGIAS: 0
SORE THROAT: 1
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
## 2024-01-18 ENCOUNTER — HOSPITAL ENCOUNTER (EMERGENCY)
Facility: MEDICAL CENTER | Age: 29
End: 2024-01-18
Attending: EMERGENCY MEDICINE
Payer: MEDICAID

## 2024-01-18 VITALS
SYSTOLIC BLOOD PRESSURE: 137 MMHG | TEMPERATURE: 97 F | WEIGHT: 244.27 LBS | BODY MASS INDEX: 43.28 KG/M2 | HEART RATE: 80 BPM | RESPIRATION RATE: 17 BRPM | DIASTOLIC BLOOD PRESSURE: 80 MMHG | OXYGEN SATURATION: 98 % | HEIGHT: 63 IN

## 2024-01-18 DIAGNOSIS — L03.811 CELLULITIS OF HEAD EXCEPT FACE: ICD-10-CM

## 2024-01-18 DIAGNOSIS — T16.1XXA FOREIGN BODY OF RIGHT EAR, INITIAL ENCOUNTER: ICD-10-CM

## 2024-01-18 PROCEDURE — 69200 CLEAR OUTER EAR CANAL: CPT

## 2024-01-18 PROCEDURE — 99282 EMERGENCY DEPT VISIT SF MDM: CPT

## 2024-01-18 PROCEDURE — 700101 HCHG RX REV CODE 250: Mod: UD | Performed by: EMERGENCY MEDICINE

## 2024-01-18 RX ORDER — LIDOCAINE HYDROCHLORIDE 20 MG/ML
20 INJECTION, SOLUTION INFILTRATION; PERINEURAL ONCE
Status: COMPLETED | OUTPATIENT
Start: 2024-01-18 | End: 2024-01-18

## 2024-01-18 RX ORDER — LEVOFLOXACIN 750 MG/1
750 TABLET, FILM COATED ORAL DAILY
Qty: 7 TABLET | Refills: 0 | Status: ACTIVE | OUTPATIENT
Start: 2024-01-18 | End: 2024-01-25

## 2024-01-18 RX ORDER — LIDOCAINE HYDROCHLORIDE 20 MG/ML
JELLY TOPICAL
Status: COMPLETED | OUTPATIENT
Start: 2024-01-18 | End: 2024-01-18

## 2024-01-18 RX ADMIN — LIDOCAINE HYDROCHLORIDE 2 %: 20 JELLY TOPICAL at 13:05

## 2024-01-18 RX ADMIN — LIDOCAINE HYDROCHLORIDE 20 ML: 20 INJECTION, SOLUTION INFILTRATION; PERINEURAL at 13:30

## 2024-01-18 ASSESSMENT — PAIN DESCRIPTION - PAIN TYPE: TYPE: ACUTE PAIN

## 2024-01-18 NOTE — ED NOTES
Patient ambulated to [facility] without incident. Primary assessment complete. Patient oriented to care area. Chart up for ERP.

## 2024-01-18 NOTE — ED TRIAGE NOTES
"Chief Complaint   Patient presents with    Ear Pain     Earring stuck in RT earlobe, x a few days.   Our place tried to remove, but it hurt too bad. Requesting numbness medicine then removal.   + swelling and redness to ear lobe.      Pt ambulatory to triage for above.    /83   Pulse 79   Temp 36.1 °C (96.9 °F) (Temporal)   Resp 16   Ht 1.6 m (5' 3\")   Wt 111 kg (244 lb 4.3 oz)   SpO2 99%   BMI 43.27 kg/m²     "

## 2024-01-18 NOTE — ED NOTES
Patient discharged per orders. Pt verbalized understanding of discharge instructions. Pt leaving in stable condition with all belongings.

## 2024-01-18 NOTE — ED PROVIDER NOTES
ER Provider Note    Scribed for Krishna Fountain M.d. by Dayana Hathaway. 2024  12:52 PM    Primary Care Provider: Pcp Pt States None    CHIEF COMPLAINT  Chief Complaint   Patient presents with    Ear Pain     Earring stuck in RT earlobe, x a few days.   Our place tried to remove, but it hurt too bad. Requesting numbness medicine then removal.   + swelling and redness to ear lobe.          HPI/ROS  LIMITATION TO HISTORY   Select: : None  OUTSIDE HISTORIAN(S):  None    Earnestine Knight is a 28 y.o. female who presents to the ED complaining of acute right ear pain onset about 2 days ago. The patient reports that her earring got stuck in her right earlobe and her ear has closed over it. She reports that the more she slept on it, the more it got stuck. The patient adds that her ear is red and has been swelling. She has tried to remove it with significant pain. The patient presents to the ED to have her earring removed. The patient is allergic to Strattera, however denies any other allergies to medication. The patient has a history of hypertension.     PAST MEDICAL HISTORY  Past Medical History:   Diagnosis Date    Anxiety     currently on medications.     Anxiety disorder     Bipolar disorder (HCC)     Depression     History of  x 3 needs another csections 2016    Hypertension 2016    PIH    Methamphetamine use (HCC), last used 2021 10/1/2021    Migraine     PTSD (post-traumatic stress disorder)     SIDS (sudden infant death syndrome) with last baby at 6 weeks of age 2016    Tobacco dependence says cut down to 5 cigarettes/day 2016     SURGICAL HISTORY  Past Surgical History:   Procedure Laterality Date    REPEAT C SECTION  2017    Procedure: REPEAT C SECTION;  Surgeon: Yu Prabhakar M.D.;  Location: LABOR AND DELIVERY;  Service:     REPEAT C SECTION  11/3/2015    Procedure: REPEAT C SECTION;  Surgeon: Ricky Vega M.D.;  Location: LABOR AND DELIVERY;  Service:      "REPEAT C SECTION  2014    PRIMARY C SECTION  2013     FAMILY HISTORY  Family History   Problem Relation Age of Onset    Alcohol/Drug Mother         \"Crack\" cocaine    Alcohol/Drug Father     Heart Disease Maternal Aunt 40        heart disease, pacemaker    Diabetes Maternal Grandfather     Cancer Maternal Grandfather         lung cancer       SOCIAL HISTORY   reports that she has been smoking cigarettes. She has a 9.0 pack-year smoking history. She has never used smokeless tobacco. She reports current drug use. Drug: Methamphetamines. She reports that she does not drink alcohol.    CURRENT MEDICATIONS  Previous Medications    ALBUTEROL 108 (90 BASE) MCG/ACT AERO SOLN INHALATION AEROSOL    Inhale 2 Puffs every 6 hours as needed for Shortness of Breath.    ARIPIPRAZOLE (ABILIFY) 10 MG TAB    Take 10 mg by mouth every day.    BENZONATATE (TESSALON) 100 MG CAP    Take 1 Capsule by mouth 3 times a day as needed for Cough.    BUSPIRONE (BUSPAR) 10 MG TAB TABLET    Take 10 mg by mouth 2 times a day.    DOXYCYCLINE (VIBRAMYCIN) 100 MG TAB    Take 1 Tablet by mouth 2 times a day.    IBUPROFEN (MOTRIN) 600 MG TAB    Take 1 Tablet by mouth every 6 hours as needed for Moderate Pain or Inflammation.    IBUPROFEN (MOTRIN) 800 MG TAB    Take 1 Tablet by mouth every 8 hours as needed for Moderate Pain.    METHYLPREDNISOLONE (MEDROL DOSEPAK) 4 MG TABLET THERAPY PACK    Follow schedule on package instructions.    TRAZODONE (DESYREL) 100 MG TAB    Take 100 mg by mouth every evening.       ALLERGIES  Strattera [atomoxetine]    PHYSICAL EXAM  /83   Pulse 79   Temp 36.1 °C (96.9 °F) (Temporal)   Resp 16   Ht 1.6 m (5' 3\")   Wt 111 kg (244 lb 4.3 oz)   SpO2 99%   BMI 43.27 kg/m²     Constitutional: Alert in no apparent distress.  HENT: Normocephalic, Atraumatic, Bilateral external ears normal. Nose normal.   Eyes: Pupils are equal and reactive. Conjunctiva normal, non-icteric.   Heart: Regular rate and rythm, no murmurs.  "   Lungs: Clear to auscultation bilaterally.  Skin: Erythema and discharge to the right inferior aspect of the earlobe that extends to the tragus. No neck erythema. Warm, Dry, No rash.   Neurologic: Alert, Grossly non-focal.   Psychiatric: Affect normal, Judgment normal, Mood normal, Appears appropriate and not intoxicated.        PROCEDURES    Foreign Body Removal Procedure    Indication: Foreign body under the skin    Procedure: The area of the foreign body was prepped with betadine and draped in a sterile fashion. Local anesthesia over the foreign body site was obtained by infiltration using 2% Lidocaine without epinephrine.  The foreign body was then removed using forceps and had the appearance of metal.  After the procedure the area was dressed with bacitracin. The patient's tetanus status was up to date and did not require a booster dose.    The patient tolerated the procedure well.    Complications: None        COURSE & MEDICAL DECISION MAKING     ED Observation Status? No; Patient does not meet criteria for ED Observation.     INITIAL ASSESSMENT, COURSE AND PLAN  Care Narrative:     1:03 PM - Patient presents to the ED with right ear pain. Patient will be treated with Uro-Jet 2% jelly and Xylocaine 2% 20 mL.     1:48 PM - Performed a foreign body removal procedure, see procedure note above. Discussed the plan for discharge with antibiotics. Patient verbalizes understanding and agreement to this plan of care.      Foreign body removed by me  Given purulence patient prescribed antibiotics with cartilage penatration which means fluoroquinolone antibiotic specifically Levaquin    HTN/IDDM FOLLOW UP:  The patient has known hypertension and is being followed by their primary care doctor      DISPOSITION AND DISCUSSIONS  I have discussed management of the patient with the following physicians and DEIDRA's:  None    Discussion of management with other QHP or appropriate source(s): None     Escalation of care considered,  and ultimately not performed: acute inpatient care management, however at this time, the patient is most appropriate for outpatient management.    Barriers to care at this time, including but not limited to: Patient does not have established PCP.           The patient will return for new or worsening symptoms and is stable at the time of discharge.    The patient is referred to a primary physician for blood pressure management, diabetic screening, and for all other preventative health concerns.    DISPOSITION:  Patient will be discharged home in stable condition.    FOLLOW UP:  No follow-up provider specified.    OUTPATIENT MEDICATIONS:  Discharge Medication List as of 1/18/2024  2:04 PM        START taking these medications    Details   levoFLOXacin (LEVAQUIN) 750 MG tablet Take 1 Tablet by mouth every day for 7 days., Disp-7 Tablet, R-0, Normal            FINAL DIANGOSIS  1. Foreign body of right ear, initial encounter    2. Cellulitis of head except face       Dayana FARFAN (Yumiko), am scribing for, and in the presence of, Krishna Fountain M.D..    Electronically signed by: Dayana Hathaway (Geovanie), 1/18/2024    IKrishna M.D. personally performed the services described in this documentation, as scribed by Dayana Htahaway in my presence, and it is both accurate and complete.      The note accurately reflects work and decisions made by me.  Krishna Fountain M.D.  1/18/2024  5:51 PM

## 2024-04-23 ENCOUNTER — APPOINTMENT (OUTPATIENT)
Dept: RADIOLOGY | Facility: MEDICAL CENTER | Age: 29
DRG: 562 | End: 2024-04-23
Payer: OTHER MISCELLANEOUS

## 2024-04-23 ENCOUNTER — APPOINTMENT (OUTPATIENT)
Dept: RADIOLOGY | Facility: MEDICAL CENTER | Age: 29
DRG: 562 | End: 2024-04-23
Attending: STUDENT IN AN ORGANIZED HEALTH CARE EDUCATION/TRAINING PROGRAM
Payer: OTHER MISCELLANEOUS

## 2024-04-23 ENCOUNTER — HOSPITAL ENCOUNTER (INPATIENT)
Facility: MEDICAL CENTER | Age: 29
LOS: 3 days | DRG: 562 | End: 2024-04-26
Attending: STUDENT IN AN ORGANIZED HEALTH CARE EDUCATION/TRAINING PROGRAM | Admitting: SURGERY
Payer: OTHER MISCELLANEOUS

## 2024-04-23 DIAGNOSIS — T14.90XA TRAUMA: ICD-10-CM

## 2024-04-23 DIAGNOSIS — S39.91XA BLUNT TRAUMA TO ABDOMEN, INITIAL ENCOUNTER: ICD-10-CM

## 2024-04-23 DIAGNOSIS — S82.141A CLOSED FRACTURE OF RIGHT TIBIAL PLATEAU, INITIAL ENCOUNTER: ICD-10-CM
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
## 2024-04-23 PROBLEM — Z78.9 NO CONTRAINDICATION TO ANTICOAGULATION THERAPY: Status: ACTIVE | Noted: 2024-04-23

## 2024-04-23 PROBLEM — R58 RETROPERITONEAL HEMORRHAGE: Status: ACTIVE | Noted: 2024-04-23

## 2024-04-23 PROBLEM — S32.592A FRACTURE OF MULTIPLE PUBIC RAMI, LEFT, CLOSED, INITIAL ENCOUNTER (HCC): Status: ACTIVE | Noted: 2024-04-23

## 2024-04-23 PROBLEM — N83.201 CYST OF RIGHT OVARY: Status: ACTIVE | Noted: 2024-04-23

## 2024-04-23 PROBLEM — N83.202 CYST OF LEFT OVARY: Status: ACTIVE | Noted: 2024-04-23

## 2024-04-23 LAB
ABO + RH BLD: NORMAL
ABO GROUP BLD: NORMAL
ALBUMIN SERPL BCP-MCNC: 4.2 G/DL (ref 3.2–4.9)
ALBUMIN/GLOB SERPL: 1.4 G/DL
ALP SERPL-CCNC: 89 U/L (ref 30–99)
ALT SERPL-CCNC: 42 U/L (ref 2–50)
ANION GAP SERPL CALC-SCNC: 10 MMOL/L (ref 7–16)
ANION GAP SERPL CALC-SCNC: 14 MMOL/L (ref 7–16)
APTT PPP: 26.7 SEC (ref 24.7–36)
AST SERPL-CCNC: 62 U/L (ref 12–45)
BILIRUB SERPL-MCNC: 0.4 MG/DL (ref 0.1–1.5)
BLD GP AB SCN SERPL QL: NORMAL
BUN SERPL-MCNC: 11 MG/DL (ref 8–22)
BUN SERPL-MCNC: 12 MG/DL (ref 8–22)
CALCIUM ALBUM COR SERPL-MCNC: 8.8 MG/DL (ref 8.5–10.5)
CALCIUM SERPL-MCNC: 8.8 MG/DL (ref 8.5–10.5)
CALCIUM SERPL-MCNC: 9 MG/DL (ref 8.5–10.5)
CHLORIDE SERPL-SCNC: 105 MMOL/L (ref 96–112)
CHLORIDE SERPL-SCNC: 106 MMOL/L (ref 96–112)
CO2 SERPL-SCNC: 21 MMOL/L (ref 20–33)
CO2 SERPL-SCNC: 22 MMOL/L (ref 20–33)
CREAT SERPL-MCNC: 0.87 MG/DL (ref 0.5–1.4)
CREAT SERPL-MCNC: 0.88 MG/DL (ref 0.5–1.4)
ERYTHROCYTE [DISTWIDTH] IN BLOOD BY AUTOMATED COUNT: 40.3 FL (ref 35.9–50)
ETHANOL BLD-MCNC: <10.1 MG/DL
GFR SERPLBLD CREATININE-BSD FMLA CKD-EPI: 90 ML/MIN/1.73 M 2
GFR SERPLBLD CREATININE-BSD FMLA CKD-EPI: 91 ML/MIN/1.73 M 2
GLOBULIN SER CALC-MCNC: 3 G/DL (ref 1.9–3.5)
GLUCOSE SERPL-MCNC: 120 MG/DL (ref 65–99)
GLUCOSE SERPL-MCNC: 163 MG/DL (ref 65–99)
HCG SERPL QL: NEGATIVE
HCT VFR BLD AUTO: 42.1 % (ref 37–47)
HGB BLD-MCNC: 14 G/DL (ref 12–16)
INR PPP: 1.15 (ref 0.87–1.13)
MCH RBC QN AUTO: 27.9 PG (ref 27–33)
MCHC RBC AUTO-ENTMCNC: 33.3 G/DL (ref 32.2–35.5)
MCV RBC AUTO: 83.9 FL (ref 81.4–97.8)
PLATELET # BLD AUTO: 300 K/UL (ref 164–446)
PMV BLD AUTO: 9.4 FL (ref 9–12.9)
POTASSIUM SERPL-SCNC: 3.7 MMOL/L (ref 3.6–5.5)
POTASSIUM SERPL-SCNC: 3.7 MMOL/L (ref 3.6–5.5)
PROT SERPL-MCNC: 7.2 G/DL (ref 6–8.2)
PROTHROMBIN TIME: 14.9 SEC (ref 12–14.6)
RBC # BLD AUTO: 5.02 M/UL (ref 4.2–5.4)
RH BLD: NORMAL
SODIUM SERPL-SCNC: 138 MMOL/L (ref 135–145)
SODIUM SERPL-SCNC: 140 MMOL/L (ref 135–145)
WBC # BLD AUTO: 13 K/UL (ref 4.8–10.8)

## 2024-04-23 PROCEDURE — 700102 HCHG RX REV CODE 250 W/ 637 OVERRIDE(OP): Performed by: SURGERY

## 2024-04-23 PROCEDURE — 73620 X-RAY EXAM OF FOOT: CPT | Mod: RT

## 2024-04-23 PROCEDURE — 86901 BLOOD TYPING SEROLOGIC RH(D): CPT

## 2024-04-23 PROCEDURE — 71045 X-RAY EXAM CHEST 1 VIEW: CPT

## 2024-04-23 PROCEDURE — 700111 HCHG RX REV CODE 636 W/ 250 OVERRIDE (IP): Mod: JZ | Performed by: SURGERY

## 2024-04-23 PROCEDURE — 73551 X-RAY EXAM OF FEMUR 1: CPT | Mod: RT

## 2024-04-23 PROCEDURE — 84703 CHORIONIC GONADOTROPIN ASSAY: CPT

## 2024-04-23 PROCEDURE — 99223 1ST HOSP IP/OBS HIGH 75: CPT | Performed by: SURGERY

## 2024-04-23 PROCEDURE — 82077 ASSAY SPEC XCP UR&BREATH IA: CPT

## 2024-04-23 PROCEDURE — 86900 BLOOD TYPING SEROLOGIC ABO: CPT

## 2024-04-23 PROCEDURE — 700111 HCHG RX REV CODE 636 W/ 250 OVERRIDE (IP): Mod: JZ,UD | Performed by: STUDENT IN AN ORGANIZED HEALTH CARE EDUCATION/TRAINING PROGRAM

## 2024-04-23 PROCEDURE — 80048 BASIC METABOLIC PNL TOTAL CA: CPT

## 2024-04-23 PROCEDURE — 73610 X-RAY EXAM OF ANKLE: CPT | Mod: RT

## 2024-04-23 PROCEDURE — 71260 CT THORAX DX C+: CPT

## 2024-04-23 PROCEDURE — 85027 COMPLETE CBC AUTOMATED: CPT

## 2024-04-23 PROCEDURE — 700105 HCHG RX REV CODE 258: Performed by: SURGERY

## 2024-04-23 PROCEDURE — 36415 COLL VENOUS BLD VENIPUNCTURE: CPT

## 2024-04-23 PROCEDURE — 85610 PROTHROMBIN TIME: CPT

## 2024-04-23 PROCEDURE — 72125 CT NECK SPINE W/O DYE: CPT

## 2024-04-23 PROCEDURE — 72131 CT LUMBAR SPINE W/O DYE: CPT

## 2024-04-23 PROCEDURE — A9270 NON-COVERED ITEM OR SERVICE: HCPCS | Performed by: SURGERY

## 2024-04-23 PROCEDURE — 72170 X-RAY EXAM OF PELVIS: CPT

## 2024-04-23 PROCEDURE — 80053 COMPREHEN METABOLIC PANEL: CPT

## 2024-04-23 PROCEDURE — 73560 X-RAY EXAM OF KNEE 1 OR 2: CPT | Mod: RT

## 2024-04-23 PROCEDURE — 99221 1ST HOSP IP/OBS SF/LOW 40: CPT | Mod: 57 | Performed by: STUDENT IN AN ORGANIZED HEALTH CARE EDUCATION/TRAINING PROGRAM

## 2024-04-23 PROCEDURE — 96374 THER/PROPH/DIAG INJ IV PUSH: CPT

## 2024-04-23 PROCEDURE — 85730 THROMBOPLASTIN TIME PARTIAL: CPT

## 2024-04-23 PROCEDURE — 700117 HCHG RX CONTRAST REV CODE 255: Mod: UD | Performed by: STUDENT IN AN ORGANIZED HEALTH CARE EDUCATION/TRAINING PROGRAM

## 2024-04-23 PROCEDURE — 86850 RBC ANTIBODY SCREEN: CPT

## 2024-04-23 PROCEDURE — 70450 CT HEAD/BRAIN W/O DYE: CPT

## 2024-04-23 PROCEDURE — 73630 X-RAY EXAM OF FOOT: CPT | Mod: LT

## 2024-04-23 PROCEDURE — 73610 X-RAY EXAM OF ANKLE: CPT | Mod: LT

## 2024-04-23 PROCEDURE — 51798 US URINE CAPACITY MEASURE: CPT

## 2024-04-23 PROCEDURE — 72128 CT CHEST SPINE W/O DYE: CPT

## 2024-04-23 PROCEDURE — 73700 CT LOWER EXTREMITY W/O DYE: CPT | Mod: RT

## 2024-04-23 PROCEDURE — 770001 HCHG ROOM/CARE - MED/SURG/GYN PRIV*

## 2024-04-23 PROCEDURE — 305948 HCHG GREEN TRAUMA ACT PRE-NOTIFY NO CC

## 2024-04-23 PROCEDURE — 99285 EMERGENCY DEPT VISIT HI MDM: CPT

## 2024-04-23 PROCEDURE — 73070 X-RAY EXAM OF ELBOW: CPT | Mod: RT

## 2024-04-23 RX ORDER — DOCUSATE SODIUM 100 MG/1
100 CAPSULE, LIQUID FILLED ORAL 2 TIMES DAILY
Status: DISCONTINUED | OUTPATIENT
Start: 2024-04-23 | End: 2024-04-26 | Stop reason: HOSPADM

## 2024-04-23 RX ORDER — QUETIAPINE FUMARATE 50 MG/1
50 TABLET, FILM COATED ORAL EVERY EVENING
COMMUNITY

## 2024-04-23 RX ORDER — PROPRANOLOL HCL 10 MG
10 TABLET ORAL
COMMUNITY

## 2024-04-23 RX ORDER — CELECOXIB 200 MG/1
200 CAPSULE ORAL 2 TIMES DAILY
Status: DISCONTINUED | OUTPATIENT
Start: 2024-04-23 | End: 2024-04-26 | Stop reason: HOSPADM

## 2024-04-23 RX ORDER — OXYCODONE HYDROCHLORIDE 10 MG/1
10 TABLET ORAL
Status: DISCONTINUED | OUTPATIENT
Start: 2024-04-23 | End: 2024-04-26 | Stop reason: HOSPADM

## 2024-04-23 RX ORDER — FAMOTIDINE 20 MG/1
20 TABLET, FILM COATED ORAL 2 TIMES DAILY
Status: DISCONTINUED | OUTPATIENT
Start: 2024-04-23 | End: 2024-04-23

## 2024-04-23 RX ORDER — AMOXICILLIN 250 MG
1 CAPSULE ORAL NIGHTLY
Status: DISCONTINUED | OUTPATIENT
Start: 2024-04-23 | End: 2024-04-26 | Stop reason: HOSPADM

## 2024-04-23 RX ORDER — ENOXAPARIN SODIUM 100 MG/ML
40 INJECTION SUBCUTANEOUS DAILY
Status: DISCONTINUED | OUTPATIENT
Start: 2024-04-24 | End: 2024-04-26 | Stop reason: HOSPADM

## 2024-04-23 RX ORDER — BISACODYL 10 MG
10 SUPPOSITORY, RECTAL RECTAL
Status: DISCONTINUED | OUTPATIENT
Start: 2024-04-23 | End: 2024-04-26 | Stop reason: HOSPADM

## 2024-04-23 RX ORDER — POLYETHYLENE GLYCOL 3350 17 G/17G
1 POWDER, FOR SOLUTION ORAL 2 TIMES DAILY
Status: DISCONTINUED | OUTPATIENT
Start: 2024-04-23 | End: 2024-04-26 | Stop reason: HOSPADM

## 2024-04-23 RX ORDER — AMOXICILLIN 250 MG
1 CAPSULE ORAL
Status: DISCONTINUED | OUTPATIENT
Start: 2024-04-23 | End: 2024-04-26 | Stop reason: HOSPADM

## 2024-04-23 RX ORDER — BUSPIRONE HYDROCHLORIDE 10 MG/1
10 TABLET ORAL 3 TIMES DAILY
COMMUNITY

## 2024-04-23 RX ORDER — BACITRACIN ZINC AND POLYMYXIN B SULFATE 500; 1000 [USP'U]/G; [USP'U]/G
OINTMENT TOPICAL 3 TIMES DAILY
Status: DISCONTINUED | OUTPATIENT
Start: 2024-04-23 | End: 2024-04-26 | Stop reason: HOSPADM

## 2024-04-23 RX ORDER — ONDANSETRON 2 MG/ML
4 INJECTION INTRAMUSCULAR; INTRAVENOUS EVERY 4 HOURS PRN
Status: DISCONTINUED | OUTPATIENT
Start: 2024-04-23 | End: 2024-04-26

## 2024-04-23 RX ORDER — OXYCODONE HYDROCHLORIDE 5 MG/1
5 TABLET ORAL
Status: DISCONTINUED | OUTPATIENT
Start: 2024-04-23 | End: 2024-04-26 | Stop reason: HOSPADM

## 2024-04-23 RX ORDER — HYDROMORPHONE HYDROCHLORIDE 1 MG/ML
0.5 INJECTION, SOLUTION INTRAMUSCULAR; INTRAVENOUS; SUBCUTANEOUS
Status: DISCONTINUED | OUTPATIENT
Start: 2024-04-23 | End: 2024-04-24

## 2024-04-23 RX ORDER — CELECOXIB 200 MG/1
200 CAPSULE ORAL 2 TIMES DAILY PRN
Status: DISCONTINUED | OUTPATIENT
Start: 2024-04-28 | End: 2024-04-26 | Stop reason: HOSPADM

## 2024-04-23 RX ORDER — ACETAMINOPHEN 500 MG
1000 TABLET ORAL EVERY 6 HOURS PRN
Status: DISCONTINUED | OUTPATIENT
Start: 2024-04-28 | End: 2024-04-26 | Stop reason: HOSPADM

## 2024-04-23 RX ORDER — ONDANSETRON 4 MG/1
4 TABLET, ORALLY DISINTEGRATING ORAL EVERY 4 HOURS PRN
Status: DISCONTINUED | OUTPATIENT
Start: 2024-04-23 | End: 2024-04-26 | Stop reason: HOSPADM

## 2024-04-23 RX ORDER — ACETAMINOPHEN 500 MG
1000 TABLET ORAL EVERY 6 HOURS
Status: DISCONTINUED | OUTPATIENT
Start: 2024-04-23 | End: 2024-04-26 | Stop reason: HOSPADM

## 2024-04-23 RX ORDER — SODIUM CHLORIDE, SODIUM LACTATE, POTASSIUM CHLORIDE, CALCIUM CHLORIDE 600; 310; 30; 20 MG/100ML; MG/100ML; MG/100ML; MG/100ML
INJECTION, SOLUTION INTRAVENOUS CONTINUOUS
Status: DISCONTINUED | OUTPATIENT
Start: 2024-04-23 | End: 2024-04-23

## 2024-04-23 RX ADMIN — FAMOTIDINE 20 MG: 10 INJECTION, SOLUTION INTRAVENOUS at 05:12

## 2024-04-23 RX ADMIN — CELECOXIB 200 MG: 200 CAPSULE ORAL at 05:11

## 2024-04-23 RX ADMIN — DOCUSATE SODIUM 100 MG: 100 CAPSULE, LIQUID FILLED ORAL at 05:12

## 2024-04-23 RX ADMIN — SENNOSIDES AND DOCUSATE SODIUM 1 TABLET: 50; 8.6 TABLET ORAL at 03:01

## 2024-04-23 RX ADMIN — SODIUM CHLORIDE, POTASSIUM CHLORIDE, SODIUM LACTATE AND CALCIUM CHLORIDE: 600; 310; 30; 20 INJECTION, SOLUTION INTRAVENOUS at 02:59

## 2024-04-23 RX ADMIN — ACETAMINOPHEN 1000 MG: 500 TABLET, FILM COATED ORAL at 17:53

## 2024-04-23 RX ADMIN — CELECOXIB 200 MG: 200 CAPSULE ORAL at 17:53

## 2024-04-23 RX ADMIN — IOHEXOL 100 ML: 350 INJECTION, SOLUTION INTRAVENOUS at 01:12

## 2024-04-23 RX ADMIN — SENNOSIDES AND DOCUSATE SODIUM 1 TABLET: 50; 8.6 TABLET ORAL at 21:21

## 2024-04-23 RX ADMIN — ACETAMINOPHEN 1000 MG: 500 TABLET, FILM COATED ORAL at 03:01

## 2024-04-23 RX ADMIN — MAGNESIUM HYDROXIDE 30 ML: 1200 LIQUID ORAL at 03:01

## 2024-04-23 RX ADMIN — POLYETHYLENE GLYCOL 3350 1 PACKET: 17 POWDER, FOR SOLUTION ORAL at 05:11

## 2024-04-23 RX ADMIN — OXYCODONE HYDROCHLORIDE 10 MG: 10 TABLET ORAL at 19:17

## 2024-04-23 RX ADMIN — FENTANYL CITRATE 50 MCG: 50 INJECTION, SOLUTION INTRAMUSCULAR; INTRAVENOUS at 00:55

## 2024-04-23 RX ADMIN — DOCUSATE SODIUM 100 MG: 100 CAPSULE, LIQUID FILLED ORAL at 17:53

## 2024-04-23 RX ADMIN — OXYCODONE HYDROCHLORIDE 10 MG: 10 TABLET ORAL at 10:29

## 2024-04-23 RX ADMIN — OXYCODONE HYDROCHLORIDE 10 MG: 10 TABLET ORAL at 05:11

## 2024-04-23 ASSESSMENT — ENCOUNTER SYMPTOMS
NEUROLOGICAL NEGATIVE: 1
PSYCHIATRIC NEGATIVE: 1
RESPIRATORY NEGATIVE: 1
MYALGIAS: 1

## 2024-04-23 ASSESSMENT — PAIN DESCRIPTION - PAIN TYPE
TYPE: ACUTE PAIN

## 2024-04-23 ASSESSMENT — COPD QUESTIONNAIRES
DO YOU EVER COUGH UP ANY MUCUS OR PHLEGM?: YES, EVERY DAY
COPD SCREENING SCORE: 6
DURING THE PAST 4 WEEKS HOW MUCH DID YOU FEEL SHORT OF BREATH: SOME OF THE TIME
HAVE YOU SMOKED AT LEAST 100 CIGARETTES IN YOUR ENTIRE LIFE: YES

## 2024-04-23 NOTE — PROGRESS NOTES
Virtual Nurse rounding complete.    Round Needs: Patient stated she was in pain & was requesting pain medication at this time. Notified primary RN.
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
## 2024-04-23 NOTE — ASSESSMENT & PLAN NOTE
Left superior and inferior pubic rami fractures.  Non-operative management.  Likely subacute.   Weight bearing status - Weightbearing as tolerated LLE.  Gunnar Zamora MD. Orthopedic Surgeon. [facility].
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
## 2024-04-23 NOTE — ASSESSMENT & PLAN NOTE
Comminuted tibial plateau fracture.  Non op management.  Follow up outpatient.  Knee immobilizer.  Weight bearing status - Nonweightbearing RLE.  Likely require outpatient MRI and possible surgical fixation.   Gunnar Zamora MD. Orthopedic Surgeon. [facility].

## 2024-04-23 NOTE — ASSESSMENT & PLAN NOTE
Intermediate density interposed between the inferior vena cava and right kidney, compatible with hemorrhage, could represent renal vascular pedicle injury.   Serial labs.  4/24 Hgb stable - further labs as clinically indicated.
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
## 2024-04-23 NOTE — ASSESSMENT & PLAN NOTE
Left ovarian cyst.  Recommend follow-up six-week pelvic sonogram recommended for evaluation of size to further characterization.  Follow up outpatient provider.
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
## 2024-04-23 NOTE — DIETARY
NUTRITION SERVICES:   BMI - Pt with BMI >40 (=Body mass index is 42.51 kg/m².), Class III obesity. Weight loss counseling not appropriate in acute care setting.   RECOMMEND - If appropriate at DC please refer to outpatient nutrition services for weight management.

## 2024-04-23 NOTE — ED NOTES
Med rec partially complete per patient  Allergies reviewed.   Outpatient antibiotics in the last 30 days? No   Anticoagulants taken in the last 14 days? No     Patient unable to provide medication strengths/last date of Abilify inj.    Pharmacy patient utilizes: [facility], [phone]    Carolyn Valderrama CPhT
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
## 2024-04-23 NOTE — THERAPY
Occupational Therapy Contact Note    Patient Name: Earnestine Knight  Age:  29 y.o., Sex:  female  Medical Record #: [id number]  Today's Date: 4/23/2024 04/23/24 1218   Treatment Variance   Reason For Missed Therapy Medical - Patient  in Procedure   Interdisciplinary Plan of Care Collaboration   IDT Collaboration with  Nursing   Collaboration Comments Pt is scheduled for surgery today, will hold OT eval and attempt when pt medically stable post op.   Session Information   Date / Session Number  4/23: OT attempt. HOld for surgery

## 2024-04-23 NOTE — PROGRESS NOTES
Brief Ortho Follow-Up:   - Right knee multiligamentous injury and possible patella tendon rupture  - Lateral tibial plateau fracture   - NWB RLE in knee immobilizer  - pubic ramus fx not seen on CT scan, likely old and unrelated to current injury  - WBAT LLE  - Follow up on outpatient basis with [facility] 7-10 days   -Discussed with Dr. Zamora

## 2024-04-23 NOTE — THERAPY
Physical Therapy Contact Note    Patient Name: Earnestine Knight  Age:  29 y.o., Sex:  female  Medical Record #: [id number]  Today's Date: 4/23/2024 04/23/24 1018   Initial Contact Note    Initial Contact Note Order Received and Verified, Physical Therapy Evaluation in Progress with Full Report to Follow.   Interdisciplinary Plan of Care Collaboration   Collaboration Comments Pt is scheduled for surgery today.  PT will follow up post op.   Session Information   Date / Session Number  4/23- sx today.  (Eval)

## 2024-04-23 NOTE — PROGRESS NOTES
Assumed care of patient at bedside report from Day RN. Updated on POC. Patient currently A & O x 4; on room air; without complaints of acute pain at this time unless RN attempts to turn patient in bed. Assessment completed, Call light within reach. Whiteboard updated. Fall precautions in place. Bed locked and in lowest position. All questions answered. No other needs indicated at this time.

## 2024-04-24 PROBLEM — R33.9 URINARY RETENTION: Status: ACTIVE | Noted: 2024-04-24

## 2024-04-24 LAB
ANION GAP SERPL CALC-SCNC: 10 MMOL/L (ref 7–16)
BASOPHILS # BLD AUTO: 0.7 % (ref 0–1.8)
BASOPHILS # BLD: 0.05 K/UL (ref 0–0.12)
BUN SERPL-MCNC: 14 MG/DL (ref 8–22)
CALCIUM SERPL-MCNC: 8.3 MG/DL (ref 8.5–10.5)
CHLORIDE SERPL-SCNC: 106 MMOL/L (ref 96–112)
CO2 SERPL-SCNC: 22 MMOL/L (ref 20–33)
CREAT SERPL-MCNC: 0.68 MG/DL (ref 0.5–1.4)
EOSINOPHIL # BLD AUTO: 0.18 K/UL (ref 0–0.51)
EOSINOPHIL NFR BLD: 2.6 % (ref 0–6.9)
ERYTHROCYTE [DISTWIDTH] IN BLOOD BY AUTOMATED COUNT: 41.8 FL (ref 35.9–50)
GFR SERPLBLD CREATININE-BSD FMLA CKD-EPI: 119 ML/MIN/1.73 M 2
GLUCOSE SERPL-MCNC: 105 MG/DL (ref 65–99)
HCT VFR BLD AUTO: 40.1 % (ref 37–47)
HGB BLD-MCNC: 13.7 G/DL (ref 12–16)
IMM GRANULOCYTES # BLD AUTO: 0.01 K/UL (ref 0–0.11)
IMM GRANULOCYTES NFR BLD AUTO: 0.1 % (ref 0–0.9)
LYMPHOCYTES # BLD AUTO: 1.82 K/UL (ref 1–4.8)
LYMPHOCYTES NFR BLD: 26.4 % (ref 22–41)
MCH RBC QN AUTO: 28.5 PG (ref 27–33)
MCHC RBC AUTO-ENTMCNC: 34.2 G/DL (ref 32.2–35.5)
MCV RBC AUTO: 83.5 FL (ref 81.4–97.8)
MONOCYTES # BLD AUTO: 0.62 K/UL (ref 0–0.85)
MONOCYTES NFR BLD AUTO: 9 % (ref 0–13.4)
NEUTROPHILS # BLD AUTO: 4.21 K/UL (ref 1.82–7.42)
NEUTROPHILS NFR BLD: 61.2 % (ref 44–72)
NRBC # BLD AUTO: 0 K/UL
NRBC BLD-RTO: 0 /100 WBC (ref 0–0.2)
PLATELET # BLD AUTO: 195 K/UL (ref 164–446)
PMV BLD AUTO: 9.7 FL (ref 9–12.9)
POTASSIUM SERPL-SCNC: 3.7 MMOL/L (ref 3.6–5.5)
RBC # BLD AUTO: 4.8 M/UL (ref 4.2–5.4)
SODIUM SERPL-SCNC: 138 MMOL/L (ref 135–145)
WBC # BLD AUTO: 6.9 K/UL (ref 4.8–10.8)

## 2024-04-24 PROCEDURE — 770001 HCHG ROOM/CARE - MED/SURG/GYN PRIV*

## 2024-04-24 PROCEDURE — 99232 SBSQ HOSP IP/OBS MODERATE 35: CPT | Performed by: NURSE PRACTITIONER

## 2024-04-24 PROCEDURE — 700102 HCHG RX REV CODE 250 W/ 637 OVERRIDE(OP): Performed by: SURGERY

## 2024-04-24 PROCEDURE — 80048 BASIC METABOLIC PNL TOTAL CA: CPT

## 2024-04-24 PROCEDURE — 97166 OT EVAL MOD COMPLEX 45 MIN: CPT

## 2024-04-24 PROCEDURE — 97162 PT EVAL MOD COMPLEX 30 MIN: CPT

## 2024-04-24 PROCEDURE — 85025 COMPLETE CBC W/AUTO DIFF WBC: CPT

## 2024-04-24 PROCEDURE — A9270 NON-COVERED ITEM OR SERVICE: HCPCS | Performed by: SURGERY

## 2024-04-24 PROCEDURE — A9270 NON-COVERED ITEM OR SERVICE: HCPCS | Performed by: NURSE PRACTITIONER

## 2024-04-24 PROCEDURE — 700111 HCHG RX REV CODE 636 W/ 250 OVERRIDE (IP): Mod: JZ | Performed by: SURGERY

## 2024-04-24 PROCEDURE — 36415 COLL VENOUS BLD VENIPUNCTURE: CPT

## 2024-04-24 PROCEDURE — 700101 HCHG RX REV CODE 250: Performed by: SURGERY

## 2024-04-24 PROCEDURE — 700102 HCHG RX REV CODE 250 W/ 637 OVERRIDE(OP): Performed by: NURSE PRACTITIONER

## 2024-04-24 RX ORDER — TAMSULOSIN HYDROCHLORIDE 0.4 MG/1
0.4 CAPSULE ORAL
Status: DISCONTINUED | OUTPATIENT
Start: 2024-04-24 | End: 2024-04-26 | Stop reason: HOSPADM

## 2024-04-24 RX ADMIN — POLYETHYLENE GLYCOL 3350 1 PACKET: 17 POWDER, FOR SOLUTION ORAL at 17:04

## 2024-04-24 RX ADMIN — SENNOSIDES AND DOCUSATE SODIUM 1 TABLET: 50; 8.6 TABLET ORAL at 20:18

## 2024-04-24 RX ADMIN — ACETAMINOPHEN 1000 MG: 500 TABLET, FILM COATED ORAL at 12:06

## 2024-04-24 RX ADMIN — ACETAMINOPHEN 1000 MG: 500 TABLET, FILM COATED ORAL at 06:20

## 2024-04-24 RX ADMIN — ENOXAPARIN SODIUM 40 MG: 100 INJECTION SUBCUTANEOUS at 17:04

## 2024-04-24 RX ADMIN — OXYCODONE HYDROCHLORIDE 10 MG: 10 TABLET ORAL at 14:10

## 2024-04-24 RX ADMIN — CELECOXIB 200 MG: 200 CAPSULE ORAL at 17:04

## 2024-04-24 RX ADMIN — OXYCODONE HYDROCHLORIDE 10 MG: 10 TABLET ORAL at 17:10

## 2024-04-24 RX ADMIN — OXYCODONE HYDROCHLORIDE 10 MG: 10 TABLET ORAL at 20:19

## 2024-04-24 RX ADMIN — Medication 1 EACH: at 17:05

## 2024-04-24 RX ADMIN — OXYCODONE 5 MG: 5 TABLET ORAL at 08:48

## 2024-04-24 RX ADMIN — DOCUSATE SODIUM 100 MG: 100 CAPSULE, LIQUID FILLED ORAL at 06:21

## 2024-04-24 RX ADMIN — Medication 1 EACH: at 12:06

## 2024-04-24 RX ADMIN — MAGNESIUM HYDROXIDE 30 ML: 1200 LIQUID ORAL at 17:04

## 2024-04-24 RX ADMIN — DOCUSATE SODIUM 100 MG: 100 CAPSULE, LIQUID FILLED ORAL at 17:04

## 2024-04-24 RX ADMIN — TAMSULOSIN HYDROCHLORIDE 0.4 MG: 0.4 CAPSULE ORAL at 12:06

## 2024-04-24 RX ADMIN — POLYETHYLENE GLYCOL 3350 1 PACKET: 17 POWDER, FOR SOLUTION ORAL at 06:21

## 2024-04-24 RX ADMIN — CELECOXIB 200 MG: 200 CAPSULE ORAL at 06:21

## 2024-04-24 RX ADMIN — ACETAMINOPHEN 1000 MG: 500 TABLET, FILM COATED ORAL at 17:05

## 2024-04-24 ASSESSMENT — ENCOUNTER SYMPTOMS
PSYCHIATRIC NEGATIVE: 1
NEUROLOGICAL NEGATIVE: 1
MYALGIAS: 1
RESPIRATORY NEGATIVE: 1

## 2024-04-24 ASSESSMENT — COGNITIVE AND FUNCTIONAL STATUS - GENERAL
DAILY ACTIVITIY SCORE: 15
DRESSING REGULAR UPPER BODY CLOTHING: A LITTLE
WALKING IN HOSPITAL ROOM: A LITTLE
MOVING FROM LYING ON BACK TO SITTING ON SIDE OF FLAT BED: A LITTLE
CLIMB 3 TO 5 STEPS WITH RAILING: TOTAL
TOILETING: A LOT
MOVING TO AND FROM BED TO CHAIR: A LITTLE
STANDING UP FROM CHAIR USING ARMS: A LITTLE
HELP NEEDED FOR BATHING: A LOT
TURNING FROM BACK TO SIDE WHILE IN FLAT BAD: A LITTLE
SUGGESTED CMS G CODE MODIFIER MOBILITY: CK
SUGGESTED CMS G CODE MODIFIER DAILY ACTIVITY: CK
PERSONAL GROOMING: A LITTLE
EATING MEALS: A LITTLE
DRESSING REGULAR LOWER BODY CLOTHING: A LOT
MOBILITY SCORE: 16

## 2024-04-24 ASSESSMENT — PAIN DESCRIPTION - PAIN TYPE
TYPE: ACUTE PAIN

## 2024-04-24 ASSESSMENT — GAIT ASSESSMENTS
ASSISTIVE DEVICE: FRONT WHEEL WALKER
DISTANCE (FEET): 3
GAIT LEVEL OF ASSIST: STANDBY ASSIST
DEVIATION: BRADYKINETIC

## 2024-04-24 ASSESSMENT — ACTIVITIES OF DAILY LIVING (ADL): TOILETING: INDEPENDENT

## 2024-04-24 NOTE — PROGRESS NOTES
Patient unable to urinate, bladder scan result is 1050 ml, placed timmons aseptically. Patient verbalized relief of pressure from her bladder.

## 2024-04-24 NOTE — CARE PLAN
The patient is Stable - Low risk of patient condition declining or worsening    Shift Goals  Clinical Goals: pain mgt,  Patient Goals: to be able to move    Progress made toward(s) clinical / shift goals:      Patient is aox4, denies any SOB/Chest pain/N and V. Able to follow commands, immobilizer in place.      Problem: Pain - Standard  Goal: Alleviation of pain or a reduction in pain to the patient’s comfort goal  Outcome: Progressing     Denies any numbness or tingling sensation, non-pharmacological and pharmacological intervention in place. Instructed to call RN if pain is gradually increasing.      Patient is not progressing towards the following goals:
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
## 2024-04-24 NOTE — DISCHARGE INSTRUCTIONS
- Call or seek medical attention for questions or concerns  - Follow up with the [facility] RETURN: PRN  - Follow up with Dr. Zamora in 2 weeks time for orthopedic follow up - no weight bearing and knee immobilizer at all times on right leg   - Follow up with primary care provider within one weeks time  - Resume regular diet  - May take over the counter acetaminophen or ibuprofen as needed for pain  - Continue daily over the counter stool softener while on narcotics  - No operation of machinery or motorized vehicles while under the influence of narcotics  - No alcohol, marijuana or illicit drug use while under the influence of narcotics  - In the event of a narcotic overdose naloxone (Narcan) is available without a prescription from any [facility] or [facility]  - No swimming, hot tubs, baths or wound submersion until cleared by outpatient provider. May shower  - No contact sports, strenuous activities, or heavy lifting until cleared by outpatient provider  - If respiratory decompensation, persistent or worsening pain, change in condition or worsening condition, or signs or symptoms of infection occur seek medical attention
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
## 2024-04-24 NOTE — CARE PLAN
Problem: Pain - Standard  Goal: Alleviation of pain or a reduction in pain to the patient’s comfort goal  Outcome: Progressing     Problem: Knowledge Deficit - Standard  Goal: Patient and family/care givers will demonstrate understanding of plan of care, disease process/condition, diagnostic tests and medications  Outcome: Progressing     Problem: Skin Integrity  Goal: Skin integrity is maintained or improved  Outcome: Progressing   The patient is Stable - Low risk of patient condition declining or worsening    Shift Goals  Clinical Goals: Comfort, shower, OOB during meals  Patient Goals: Comfort  Family Goals: Not present    Progress made toward(s) clinical / shift goals:      Patient is not progressing towards the following goals:
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
## 2024-04-25 PROBLEM — Z75.8 DISCHARGE PLANNING ISSUES: Status: ACTIVE | Noted: 2024-04-25

## 2024-04-25 PROCEDURE — A9270 NON-COVERED ITEM OR SERVICE: HCPCS | Performed by: SURGERY

## 2024-04-25 PROCEDURE — 700102 HCHG RX REV CODE 250 W/ 637 OVERRIDE(OP): Performed by: NURSE PRACTITIONER

## 2024-04-25 PROCEDURE — 700102 HCHG RX REV CODE 250 W/ 637 OVERRIDE(OP): Performed by: SURGERY

## 2024-04-25 PROCEDURE — A9270 NON-COVERED ITEM OR SERVICE: HCPCS | Performed by: NURSE PRACTITIONER

## 2024-04-25 PROCEDURE — 770001 HCHG ROOM/CARE - MED/SURG/GYN PRIV*

## 2024-04-25 PROCEDURE — 700111 HCHG RX REV CODE 636 W/ 250 OVERRIDE (IP): Mod: JZ | Performed by: SURGERY

## 2024-04-25 PROCEDURE — 97116 GAIT TRAINING THERAPY: CPT

## 2024-04-25 PROCEDURE — 700101 HCHG RX REV CODE 250: Performed by: SURGERY

## 2024-04-25 PROCEDURE — 99232 SBSQ HOSP IP/OBS MODERATE 35: CPT | Performed by: NURSE PRACTITIONER

## 2024-04-25 RX ADMIN — TAMSULOSIN HYDROCHLORIDE 0.4 MG: 0.4 CAPSULE ORAL at 11:27

## 2024-04-25 RX ADMIN — DOCUSATE SODIUM 100 MG: 100 CAPSULE, LIQUID FILLED ORAL at 16:39

## 2024-04-25 RX ADMIN — Medication 1 EACH: at 16:39

## 2024-04-25 RX ADMIN — Medication 1 EACH: at 06:34

## 2024-04-25 RX ADMIN — OXYCODONE HYDROCHLORIDE 10 MG: 10 TABLET ORAL at 16:39

## 2024-04-25 RX ADMIN — ACETAMINOPHEN 1000 MG: 500 TABLET, FILM COATED ORAL at 16:39

## 2024-04-25 RX ADMIN — OXYCODONE HYDROCHLORIDE 10 MG: 10 TABLET ORAL at 21:34

## 2024-04-25 RX ADMIN — OXYCODONE HYDROCHLORIDE 10 MG: 10 TABLET ORAL at 11:26

## 2024-04-25 RX ADMIN — POLYETHYLENE GLYCOL 3350 1 PACKET: 17 POWDER, FOR SOLUTION ORAL at 16:39

## 2024-04-25 RX ADMIN — ACETAMINOPHEN 1000 MG: 500 TABLET, FILM COATED ORAL at 00:58

## 2024-04-25 RX ADMIN — ACETAMINOPHEN 1000 MG: 500 TABLET, FILM COATED ORAL at 11:26

## 2024-04-25 RX ADMIN — ENOXAPARIN SODIUM 40 MG: 100 INJECTION SUBCUTANEOUS at 16:38

## 2024-04-25 RX ADMIN — ACETAMINOPHEN 1000 MG: 500 TABLET, FILM COATED ORAL at 06:34

## 2024-04-25 RX ADMIN — MAGNESIUM HYDROXIDE 30 ML: 1200 LIQUID ORAL at 16:39

## 2024-04-25 RX ADMIN — DOCUSATE SODIUM 100 MG: 100 CAPSULE, LIQUID FILLED ORAL at 06:34

## 2024-04-25 RX ADMIN — POLYETHYLENE GLYCOL 3350 1 PACKET: 17 POWDER, FOR SOLUTION ORAL at 06:34

## 2024-04-25 RX ADMIN — Medication: at 12:00

## 2024-04-25 RX ADMIN — CELECOXIB 200 MG: 200 CAPSULE ORAL at 06:34

## 2024-04-25 RX ADMIN — CELECOXIB 200 MG: 200 CAPSULE ORAL at 16:39

## 2024-04-25 RX ADMIN — SENNOSIDES AND DOCUSATE SODIUM 1 TABLET: 50; 8.6 TABLET ORAL at 21:34

## 2024-04-25 ASSESSMENT — ENCOUNTER SYMPTOMS
NEUROLOGICAL NEGATIVE: 1
GASTROINTESTINAL NEGATIVE: 1
EYES NEGATIVE: 1
RESPIRATORY NEGATIVE: 1
CHILLS: 0
FEVER: 0
CARDIOVASCULAR NEGATIVE: 1
PSYCHIATRIC NEGATIVE: 1
MYALGIAS: 1

## 2024-04-25 ASSESSMENT — PAIN DESCRIPTION - PAIN TYPE
TYPE: ACUTE PAIN

## 2024-04-25 ASSESSMENT — GAIT ASSESSMENTS
ASSISTIVE DEVICE: FRONT WHEEL WALKER
GAIT LEVEL OF ASSIST: CONTACT GUARD ASSIST
DISTANCE (FEET): 5
DEVIATION: STEP TO

## 2024-04-25 ASSESSMENT — COGNITIVE AND FUNCTIONAL STATUS - GENERAL
MOVING TO AND FROM BED TO CHAIR: A LITTLE
STANDING UP FROM CHAIR USING ARMS: A LITTLE
SUGGESTED CMS G CODE MODIFIER MOBILITY: CK
MOVING FROM LYING ON BACK TO SITTING ON SIDE OF FLAT BED: A LITTLE
TURNING FROM BACK TO SIDE WHILE IN FLAT BAD: A LITTLE
CLIMB 3 TO 5 STEPS WITH RAILING: A LOT
WALKING IN HOSPITAL ROOM: A LITTLE
MOBILITY SCORE: 17
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
## 2024-04-25 NOTE — CARE PLAN
Problem: Pain - Standard  Goal: Alleviation of pain or a reduction in pain to the patient’s comfort goal  Outcome: Progressing     Problem: Knowledge Deficit - Standard  Goal: Patient and family/care givers will demonstrate understanding of plan of care, disease process/condition, diagnostic tests and medications  Outcome: Progressing     Problem: Skin Integrity  Goal: Skin integrity is maintained or improved  Outcome: Progressing   The patient is Stable - Low risk of patient condition declining or worsening    Shift Goals  Clinical Goals: OOB during meals, D/C foly and bladder scan if needed.  Patient Goals: Sleep, possible surgery  Family Goals: not present    Progress made toward(s) clinical / shift goals:      Patient is not progressing towards the following goals:

## 2024-04-25 NOTE — ASSESSMENT & PLAN NOTE
Date of admission: 4/23/2024.  04/24 SNF referral.  Cleared for discharge: Yes - Date: 04/25 .  Discharge delayed: No.  Discharge date: tbd.

## 2024-04-25 NOTE — THERAPY
Physical Therapy   Daily Treatment     Patient Name: Earnestine Knight  Age:  29 y.o., Sex:  female  Medical Record #: [id number]  Today's Date: 4/25/2024     Precautions  Precautions: Fall Risk;Non Weight Bearing Right Lower Extremity;Immobilizer Right Lower Extremity  Comments: non op    Assessment    Pt seen for PT treatment session, agreeable to mobility and stair training. Pt educated on proper immobilizer positioning and fit prior to getting OOB. Pt ambulated 5 ft with FWW with maintenance of NWB R LE. Initiated stair training on 2 steps with B railing support. Pt initially caught her L toes upon hopping up causing L toe/foot pain, but able to re attempt with appropriate foot clearance and complete 2 steps with NWB maintained. Encouraged pt to reflect on current level of function and ability to care for herself in her home environment vs placement as pt is pending Outpatient Ortho follow up for definitive POC regarding R LE. PT will continue to follow while in house.     Plan    Treatment Plan Status: Continue Current Treatment Plan  Type of Treatment: Bed Mobility, Equipment, Gait Training, Manual Therapy, Neuro Re-Education / Balance, Self Care / Home Evaluation, Stair Training, Therapeutic Activities, Therapeutic Exercise  Treatment Frequency: 4 Times per Week  Treatment Duration: Until Therapy Goals Met    DC Equipment Recommendations: Front-Wheel Walker  Discharge Recommendations: Recommend post-acute placement for additional physical therapy services prior to discharge home    Objective     04/25/24 1630   Precautions   Precautions Fall Risk;Non Weight Bearing Right Lower Extremity;Immobilizer Right Lower Extremity   Comments non op   Vitals   O2 Delivery Device None - Room Air   Pain 0 - 10 Group   Therapist Pain Assessment During Activity;Nurse Notified  (pain noted, not rated)   Cognition    Level of Consciousness Alert   Comments agreeable to work with PT, but self limits based on pain level   Other  Treatments   Other Treatments Provided education on proper immobilizer placement and tightness to support R knee   Balance   Sitting Balance (Static) Fair +   Sitting Balance (Dynamic) Fair +   Standing Balance (Static) Fair   Standing Balance (Dynamic) Fair   Weight Shift Sitting Good   Weight Shift Standing Fair   Skilled Intervention Verbal Cuing;Sequencing;Compensatory Strategies   Comments good use of FWW to off load. Good maintenance of NWB today   Bed Mobility    Supine to Sit Supervised   Sit to Supine Supervised   Scooting Supervised   Skilled Intervention Verbal Cuing   Gait Analysis   Gait Level Of Assist Contact Guard Assist   Assistive Device Front Wheel Walker   Distance (Feet) 5   # of Times Distance was Traveled 1   Deviation Step To   # of Stairs Climbed 2  (with B railing support)   Level of Assist with Stairs Contact Guard Assist   Weight Bearing Status NWB R LE   Skilled Intervention Verbal Cuing;Sequencing;Compensatory Strategies   Comments cues to sequence stair negotation. caught L toes with first hop onto stair causing pain. able to maintain NWB R LE throughout stairs with Min cues   Functional Mobility   Sit to Stand Contact Guard Assist  (pushes with B UE on FWW)   Bed, Chair, Wheelchair Transfer Standby Assist   Transfer Method Stand Step   Skilled Intervention Verbal Cuing;Compensatory Strategies   6 Clicks Assessment - How much HELP from from another person do you currently need... (If the patient hasn't done an activity recently, how much help from another person do you think he/she would need if he/she tried?)   Turning from your back to your side while in a flat bed without using bedrails? 3   Moving from lying on your back to sitting on the side of a flat bed without using bedrails? 3   Moving to and from a bed to a chair (including a wheelchair)? 3   Standing up from a chair using your arms (e.g., wheelchair, or bedside chair)? 3   Walking in hospital room? 3   Climbing 3-5 steps  with a railing? 2   6 clicks Mobility Score 17   Short Term Goals    Short Term Goal # 1 Pt will perform supine < > sit SPV with bed flat, no rail in 6 visits in order to set up fpr upright mobility.   Goal Outcome # 1 Goal met   Short Term Goal # 2 Pt will perform sit < > stand SPV in 6 vistis with FWW in order to prepare for ambulation.   Goal Outcome # 2 Progressing as expected   Short Term Goal # 3 Pt will ambulate 100' with FWW SPV, NWB % of the time in 6 visits in order  to return home safely.   Goal Outcome # 3 Progressing as expected   Short Term Goal # 4 Pt will ascend/ descend 12 steps with B rails SPV in 6 visits in order to access her home.   Goal Outcome # 4 Progressing as expected   Physical Therapy Treatment Plan   Physical Therapy Treatment Plan Continue Current Treatment Plan

## 2024-04-25 NOTE — PROGRESS NOTES
Assumed care of patient and received report from Wild FISHER. Assessment completed.Pt A&Ox 4. Respirations are even and unlabored on room air. Pt reports 9/10 pain. Medication per MAR. Medical pt, call light and belongings are within reach. POC updated. Pt educated on room and call light, pt verbalized understanding. Needs met.

## 2024-04-25 NOTE — DISCHARGE PLANNING
[id number]  Agency/Facility Name: [facility]  Outcome: DPA called regarding pending referral. Left VM with name and callback number.     [id number]  Agency/Facility Name: [facility]  Spoke To: Andres  Outcome: DPA called regarding pending referral. Pt has been approved.
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
## 2024-04-25 NOTE — CARE PLAN
The patient is Stable - Low risk of patient condition declining or worsening    Shift Goals  Clinical Goals: pain management  Patient Goals: pain control, comfort  Family Goals: not present      Problem: Pain - Standard  Goal: Alleviation of pain or a reduction in pain to the patient’s comfort goal  Outcome: Progressing     Problem: Knowledge Deficit - Standard  Goal: Patient and family/care givers will demonstrate understanding of plan of care, disease process/condition, diagnostic tests and medications  Outcome: Progressing     Problem: Skin Integrity  Goal: Skin integrity is maintained or improved  Outcome: Progressing

## 2024-04-26 VITALS
SYSTOLIC BLOOD PRESSURE: 130 MMHG | TEMPERATURE: 97 F | HEIGHT: 63 IN | RESPIRATION RATE: 17 BRPM | HEART RATE: 80 BPM | DIASTOLIC BLOOD PRESSURE: 85 MMHG | OXYGEN SATURATION: 94 % | BODY MASS INDEX: 42.52 KG/M2 | WEIGHT: 240 LBS

## 2024-04-26 PROCEDURE — 99239 HOSP IP/OBS DSCHRG MGMT >30: CPT | Performed by: NURSE PRACTITIONER

## 2024-04-26 PROCEDURE — 700102 HCHG RX REV CODE 250 W/ 637 OVERRIDE(OP): Performed by: SURGERY

## 2024-04-26 PROCEDURE — 700101 HCHG RX REV CODE 250: Performed by: SURGERY

## 2024-04-26 PROCEDURE — A9270 NON-COVERED ITEM OR SERVICE: HCPCS | Performed by: SURGERY

## 2024-04-26 RX ORDER — OXYCODONE HYDROCHLORIDE 5 MG/1
5 TABLET ORAL EVERY 6 HOURS PRN
Qty: 10 TABLET | Refills: 0 | Status: SHIPPED | OUTPATIENT
Start: 2024-04-26 | End: 2024-05-03

## 2024-04-26 RX ORDER — ENOXAPARIN SODIUM 100 MG/ML
40 INJECTION SUBCUTANEOUS DAILY
Status: ON HOLD | DISCHARGE
Start: 2024-04-26 | End: 2024-05-06

## 2024-04-26 RX ORDER — AMOXICILLIN 250 MG
1 CAPSULE ORAL NIGHTLY
Status: ON HOLD
Start: 2024-04-26 | End: 2024-05-06

## 2024-04-26 RX ORDER — BACITRACIN ZINC AND POLYMYXIN B SULFATE 500; 1000 [USP'U]/G; [USP'U]/G
1 OINTMENT TOPICAL 3 TIMES DAILY
Status: ON HOLD | COMMUNITY
Start: 2024-04-26 | End: 2024-05-06

## 2024-04-26 RX ORDER — ACETAMINOPHEN 500 MG
1000 TABLET ORAL EVERY 6 HOURS PRN
Status: ON HOLD
Start: 2024-04-26 | End: 2024-05-06

## 2024-04-26 RX ORDER — CELECOXIB 200 MG/1
CAPSULE ORAL
Status: SHIPPED
Start: 2024-04-26 | End: 2024-05-08

## 2024-04-26 RX ORDER — POLYETHYLENE GLYCOL 3350 17 G/17G
17 POWDER, FOR SOLUTION ORAL 2 TIMES DAILY
Status: SHIPPED
Start: 2024-04-26

## 2024-04-26 RX ORDER — OXYCODONE HYDROCHLORIDE 5 MG/1
5 TABLET ORAL EVERY 6 HOURS PRN
Qty: 10 TABLET | Refills: 0 | Status: SHIPPED
Start: 2024-04-26 | End: 2024-04-26

## 2024-04-26 RX ORDER — AMOXICILLIN 250 MG
1 CAPSULE ORAL
Status: ON HOLD
Start: 2024-04-26 | End: 2024-05-06

## 2024-04-26 RX ORDER — ONDANSETRON 4 MG/1
4 TABLET, ORALLY DISINTEGRATING ORAL EVERY 4 HOURS PRN
Status: SHIPPED
Start: 2024-04-26

## 2024-04-26 RX ORDER — PSEUDOEPHEDRINE HCL 30 MG
100 TABLET ORAL 2 TIMES DAILY
Status: SHIPPED
Start: 2024-04-26

## 2024-04-26 RX ADMIN — ACETAMINOPHEN 1000 MG: 500 TABLET, FILM COATED ORAL at 05:05

## 2024-04-26 RX ADMIN — Medication 1 EACH: at 05:05

## 2024-04-26 RX ADMIN — Medication 1 EACH: at 11:21

## 2024-04-26 RX ADMIN — CELECOXIB 200 MG: 200 CAPSULE ORAL at 05:05

## 2024-04-26 RX ADMIN — OXYCODONE HYDROCHLORIDE 10 MG: 10 TABLET ORAL at 11:21

## 2024-04-26 RX ADMIN — ACETAMINOPHEN 1000 MG: 500 TABLET, FILM COATED ORAL at 11:21

## 2024-04-26 RX ADMIN — POLYETHYLENE GLYCOL 3350 1 PACKET: 17 POWDER, FOR SOLUTION ORAL at 05:04

## 2024-04-26 RX ADMIN — DOCUSATE SODIUM 100 MG: 100 CAPSULE, LIQUID FILLED ORAL at 05:05

## 2024-04-26 ASSESSMENT — PAIN DESCRIPTION - PAIN TYPE: TYPE: ACUTE PAIN

## 2024-04-26 NOTE — CARE PLAN
The patient is Stable - Low risk of patient condition declining or worsening    Shift Goals  Clinical Goals: pain control  Patient Goals: sleep, comfort  Family Goals: not present      Problem: Pain - Standard  Goal: Alleviation of pain or a reduction in pain to the patient’s comfort goal  Outcome: Progressing     Problem: Knowledge Deficit - Standard  Goal: Patient and family/care givers will demonstrate understanding of plan of care, disease process/condition, diagnostic tests and medications  Outcome: Progressing     Problem: Skin Integrity  Goal: Skin integrity is maintained or improved  Outcome: Progressing

## 2024-04-26 NOTE — FACE TO FACE
Face to Face Supporting Documentation - Home Health    The encounter with this patient was in whole or in part the primary reason for home health admission.    Date of encounter:   Patient:                    MRN:                       YOB: 2026  Earnestine Knight  [id number]  1995     Home health to see patient for:  Skilled Nursing care for assessment, interventions & education and Physical Therapy evaluation and treatment    Skilled need for:  Comment: NWB RLE     Skilled nursing interventions to include:  Comment: medication management     Homebound status evidenced by:  Need the aid of supportive devices such as crutches, canes, wheelchairs or walkers or Needs the assistance of another person in order to leave the home. Leaving home requires a considerable and taxing effort. There is a normal inability to leave the home.    Community Physician to provide follow up care: No primary care provider on file.     Optional Interventions? No    I certify the face to face encounter for this home health care referral meets the CMS requirements and the encounter/clinical assessment with the patient was, in whole, or in part, for the medical condition(s) listed above, which is the primary reason for home health care. Based on my clinical findings: the service(s) are medically necessary, support the need for home health care, and the homebound criteria are met.  I certify that this patient has had a face to face encounter by myself.  YU Ott. - NPI: [id number]

## 2024-04-26 NOTE — DISCHARGE PLANNING
"Patient medically cleared for post-acute and is accepted with [facility]. NATALIA PEREZ met with patient to discuss discharging to SNF today. Patient is very hesitant to go to SNF. RN CM educated her on the benefits, including increased therapy which will increase her chances of success once she goes home. Patient verbalized understanding. She requested writer call her [facility] , Denise at [phone]. NATALIA PEREZ called Denise and left voicemail requesting call back.   Plan is to discuss with IDT to encourage patient in making decision and help answer her questions.   0950: RN CM and bedside RN again spoke with patient after talking to provider. Patient is medically cleared from an acute care standpoint. Told she is being discharged today, per provider's order, to post-acute placement or home. She became frustrated and declined post-acute placement. Patient refusing to answer questions from writer. She picked up phone and called someone stating \"Come pick me up\".   RN CM requested provider to place FWW and HH order which have been placed. Referrals sent.   Patient not cooperative at this time with answering questions regarding needs.   Provided outpatient mental health and substance abuse resources due to PDI score and circumstances that brought her into our facility.   Plan at this time appears to go home with private transportation from friend. RN CM remains available for discharge needs that may arise.   1012: Patient called friend who said she is not safe to go home. Patient now agreeable to SNF. COBRA signed by patient. FWW order discontinued.   Requested transportation for 1200. Pending confirmation.   COBRA signed by provider, discharge packet complete with face sheets x2, discharge summary, 72 hrs of chart notes and hard script for narcotics. DC packet given to bedside RN with number for report. No further needs from case management.     Case Management Discharge Planning    Admission Date: " 4/23/2024  GMLOS: 4.3  ALOS: 3    6-Clicks ADL Score: 15  6-Clicks Mobility Score: 17  PT and/or OT Eval ordered: Yes  Post-acute Referrals Ordered: Yes  Post-acute Choice Obtained: No  Has referral(s) been sent to post-acute provider:  Yes      Anticipated Discharge Dispo: Discharge Disposition: D/T to SNF with Medicare cert in anticipation of skilled care (03)    DME Needed: Yes    DME Ordered: No    Action(s) Taken: Updated Provider/Nurse on Discharge Plan and Patient Conference    Escalations Completed: Patient    Medically Clear: Yes    Next Steps: Pending decision from patient on post-acute vs. home    Barriers to Discharge: Patient's decision    Is the patient up for discharge tomorrow: Hopefully today.

## 2024-04-26 NOTE — CARE PLAN
Problem: Pain - Standard  Goal: Alleviation of pain or a reduction in pain to the patient’s comfort goal  Outcome: Progressing     Problem: Knowledge Deficit - Standard  Goal: Patient and family/care givers will demonstrate understanding of plan of care, disease process/condition, diagnostic tests and medications  Outcome: Progressing     Problem: Skin Integrity  Goal: Skin integrity is maintained or improved  Outcome: Progressing   The patient is Stable - Low risk of patient condition declining or worsening    Shift Goals  Clinical Goals: Discharge patient to [facility]  Patient Goals: Discharge to [place]  Family Goals: Not present    Progress made toward(s) clinical / shift goals:      Patient is not progressing towards the following goals:

## 2024-04-26 NOTE — PROGRESS NOTES
Seen and assessed   Cleared for post acute services  Cobra signed  RX given to CM   DC summary complete

## 2024-04-26 NOTE — PROGRESS NOTES
Assumed care of patient and received report from Wild FISHER. Assessment completed.Pt A&Ox 4. Respirations are even and unlabored on room air. Medical pt, call light and belongings are within reach. POC updated. Pt educated on room and call light, pt verbalized understanding. Needs met.

## 2024-04-26 NOTE — DISCHARGE PLANNING
DC Transport Scheduled    Transport Company Scheduled:  [employer]  Spoke with Ange at [facility] to schedule transport.  [facility] Trip #: [id number]    Scheduled Date: 4/26/2024  Scheduled Time: 1200    Destination: [facility]   Destination address: [place] [address]    Notified care team of scheduled transport via Voalte.     If there are any changes needed to the DC transportation scheduled, please contact [facility] at ext. [phone] between the hours of 6447-2317 Mon-Fri. If outside those hours, contact the ED Case Manager at ext. [phone].

## 2024-04-26 NOTE — DISCHARGE PLANNING
0817  Agency/Facility Name: [facility]   Spoke To: Andres  Outcome: DPA called regarding bed availability and transport time. Left VM with name and callback number.     0992  Agency/Facility Name: [facility]   Spoke To: Andres  Outcome: DPA called regarding bed availability and transport time. There is a bed available today and requested a 1200 transport time if pt is agreeable to go to SNF.     1036  Agency/Facility Name: [facility]  Spoke To: Andres  Outcome: DPA called to confirm transport time with facility.

## 2024-04-26 NOTE — PROGRESS NOTES
Attention order for dispensing walker has been now put on hold from our supply. Was informed that patient will be getting this arranged through a different provider. If any changes or questions are needed with this process moving forward please do not hesitate to reach out to the ortho tech team for any way that we may be of assistance .

## 2024-05-06 ENCOUNTER — HOSPITAL ENCOUNTER (OUTPATIENT)
Facility: MEDICAL CENTER | Age: 29
End: 2024-05-06
Attending: STUDENT IN AN ORGANIZED HEALTH CARE EDUCATION/TRAINING PROGRAM | Admitting: STUDENT IN AN ORGANIZED HEALTH CARE EDUCATION/TRAINING PROGRAM
Payer: MEDICAID

## 2024-05-06 ENCOUNTER — APPOINTMENT (OUTPATIENT)
Dept: RADIOLOGY | Facility: MEDICAL CENTER | Age: 29
End: 2024-05-06
Attending: STUDENT IN AN ORGANIZED HEALTH CARE EDUCATION/TRAINING PROGRAM
Payer: MEDICAID

## 2024-05-06 ENCOUNTER — ANESTHESIA (OUTPATIENT)
Dept: SURGERY | Facility: MEDICAL CENTER | Age: 29
End: 2024-05-06
Payer: MEDICAID

## 2024-05-06 ENCOUNTER — PHARMACY VISIT (OUTPATIENT)
Dept: PHARMACY | Facility: MEDICAL CENTER | Age: 29
End: 2024-05-06
Payer: COMMERCIAL

## 2024-05-06 ENCOUNTER — ANESTHESIA EVENT (OUTPATIENT)
Dept: SURGERY | Facility: MEDICAL CENTER | Age: 29
End: 2024-05-06
Payer: MEDICAID

## 2024-05-06 VITALS
RESPIRATION RATE: 16 BRPM | TEMPERATURE: 97.3 F | WEIGHT: 265.43 LBS | SYSTOLIC BLOOD PRESSURE: 167 MMHG | OXYGEN SATURATION: 95 % | DIASTOLIC BLOOD PRESSURE: 97 MMHG | HEART RATE: 100 BPM | BODY MASS INDEX: 47.03 KG/M2 | HEIGHT: 63 IN
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
## 2024-05-06 DIAGNOSIS — S39.91XA BLUNT TRAUMA TO ABDOMEN, INITIAL ENCOUNTER: ICD-10-CM

## 2024-05-06 DIAGNOSIS — S82.141A CLOSED FRACTURE OF RIGHT TIBIAL PLATEAU, INITIAL ENCOUNTER: ICD-10-CM

## 2024-05-06 LAB — HCG UR QL: NEGATIVE

## 2024-05-06 PROCEDURE — RXMED WILLOW AMBULATORY MEDICATION CHARGE: Performed by: STUDENT IN AN ORGANIZED HEALTH CARE EDUCATION/TRAINING PROGRAM

## 2024-05-06 DEVICE — SCREW 3.5 MM NON-LOCKING TI X 26MM LONG (6TX8=48): Type: IMPLANTABLE DEVICE | Site: LEG | Status: FUNCTIONAL

## 2024-05-06 DEVICE — SCREW 3.5 MM NON-LOCKING TI X 28MM LONG (6TX8=48): Type: IMPLANTABLE DEVICE | Site: LEG | Status: FUNCTIONAL

## 2024-05-06 DEVICE — SCREW 3.5 MM NON-LOCKING TI X 32MM LONG (6TX8=48): Type: IMPLANTABLE DEVICE | Site: LEG | Status: FUNCTIONAL

## 2024-05-06 DEVICE — SCREW 3.5 MM NON-LOCKING TI X 70MM LONG (6TX4=24): Type: IMPLANTABLE DEVICE | Site: LEG | Status: FUNCTIONAL

## 2024-05-06 DEVICE — SCREW 3.5 MM LOCKING TI X 60MM LONG (6TX8=48): Type: IMPLANTABLE DEVICE | Site: LEG | Status: FUNCTIONAL

## 2024-05-06 DEVICE — GRAFT BONE CANCELLOUS FREEZE DRIED CHIPS ALLOSOURCE 15CC (1EA): Type: IMPLANTABLE DEVICE | Site: LEG | Status: FUNCTIONAL

## 2024-05-06 DEVICE — SCREW 3.5 MM LOCKING TI X 75MM LONG (6TX4=24): Type: IMPLANTABLE DEVICE | Site: LEG | Status: FUNCTIONAL

## 2024-05-06 DEVICE — SCREW 3.5 MM LOCKING TI X 55MM LONG (6TX8=48): Type: IMPLANTABLE DEVICE | Site: LEG | Status: FUNCTIONAL

## 2024-05-06 DEVICE — SCREW 3.5 MM LOCKING TI X 70MM LONG (6TX4=24): Type: IMPLANTABLE DEVICE | Site: LEG | Status: FUNCTIONAL

## 2024-05-06 DEVICE — PLATE LATERIAL PROXIMAL TIBIA 6H RIGHT (2TX1=2): Type: IMPLANTABLE DEVICE | Site: LEG | Status: FUNCTIONAL

## 2024-05-06 RX ORDER — HYDRALAZINE HYDROCHLORIDE 20 MG/ML
5 INJECTION INTRAMUSCULAR; INTRAVENOUS
Status: DISCONTINUED | OUTPATIENT
Start: 2024-05-06 | End: 2024-05-06 | Stop reason: HOSPADM

## 2024-05-06 RX ORDER — ENOXAPARIN SODIUM 100 MG/ML
40 INJECTION SUBCUTANEOUS DAILY
Qty: 30 EACH | Refills: 2 | Status: SHIPPED | OUTPATIENT
Start: 2024-05-06

## 2024-05-06 RX ORDER — HYDROMORPHONE HYDROCHLORIDE 1 MG/ML
0.2 INJECTION, SOLUTION INTRAMUSCULAR; INTRAVENOUS; SUBCUTANEOUS
Status: DISCONTINUED | OUTPATIENT
Start: 2024-05-06 | End: 2024-05-06 | Stop reason: HOSPADM

## 2024-05-06 RX ORDER — DEXAMETHASONE SODIUM PHOSPHATE 4 MG/ML
INJECTION, SOLUTION INTRA-ARTICULAR; INTRALESIONAL; INTRAMUSCULAR; INTRAVENOUS; SOFT TISSUE PRN
Status: DISCONTINUED | OUTPATIENT
Start: 2024-05-06 | End: 2024-05-06 | Stop reason: SURG

## 2024-05-06 RX ORDER — VANCOMYCIN HYDROCHLORIDE 1 G/20ML
INJECTION, POWDER, LYOPHILIZED, FOR SOLUTION INTRAVENOUS
Status: COMPLETED | OUTPATIENT
Start: 2024-05-06 | End: 2024-05-06

## 2024-05-06 RX ORDER — MEPERIDINE HYDROCHLORIDE 25 MG/ML
12.5 INJECTION INTRAMUSCULAR; INTRAVENOUS; SUBCUTANEOUS
Status: DISCONTINUED | OUTPATIENT
Start: 2024-05-06 | End: 2024-05-06 | Stop reason: HOSPADM

## 2024-05-06 RX ORDER — ONDANSETRON 2 MG/ML
INJECTION INTRAMUSCULAR; INTRAVENOUS PRN
Status: DISCONTINUED | OUTPATIENT
Start: 2024-05-06 | End: 2024-05-06 | Stop reason: SURG

## 2024-05-06 RX ORDER — CEFAZOLIN SODIUM 1 G/3ML
2 INJECTION, POWDER, FOR SOLUTION INTRAMUSCULAR; INTRAVENOUS ONCE
Status: DISCONTINUED | OUTPATIENT
Start: 2024-05-06 | End: 2024-05-06 | Stop reason: HOSPADM

## 2024-05-06 RX ORDER — DOXYCYCLINE HYCLATE 100 MG
100 TABLET ORAL 2 TIMES DAILY
Qty: 20 TABLET | Refills: 0 | Status: SHIPPED | OUTPATIENT
Start: 2024-05-06 | End: 2024-05-16

## 2024-05-06 RX ORDER — KETOROLAC TROMETHAMINE 15 MG/ML
INJECTION, SOLUTION INTRAMUSCULAR; INTRAVENOUS PRN
Status: DISCONTINUED | OUTPATIENT
Start: 2024-05-06 | End: 2024-05-06 | Stop reason: SURG

## 2024-05-06 RX ORDER — LIDOCAINE HYDROCHLORIDE 20 MG/ML
INJECTION, SOLUTION EPIDURAL; INFILTRATION; INTRACAUDAL; PERINEURAL PRN
Status: DISCONTINUED | OUTPATIENT
Start: 2024-05-06 | End: 2024-05-06 | Stop reason: SURG

## 2024-05-06 RX ORDER — SODIUM CHLORIDE, SODIUM LACTATE, POTASSIUM CHLORIDE, CALCIUM CHLORIDE 600; 310; 30; 20 MG/100ML; MG/100ML; MG/100ML; MG/100ML
INJECTION, SOLUTION INTRAVENOUS
Status: DISCONTINUED | OUTPATIENT
Start: 2024-05-06 | End: 2024-05-06 | Stop reason: SURG

## 2024-05-06 RX ORDER — HYDROMORPHONE HYDROCHLORIDE 1 MG/ML
0.4 INJECTION, SOLUTION INTRAMUSCULAR; INTRAVENOUS; SUBCUTANEOUS
Status: DISCONTINUED | OUTPATIENT
Start: 2024-05-06 | End: 2024-05-06 | Stop reason: HOSPADM

## 2024-05-06 RX ORDER — OXYCODONE HCL 5 MG/5 ML
5 SOLUTION, ORAL ORAL
Status: COMPLETED | OUTPATIENT
Start: 2024-05-06 | End: 2024-05-06

## 2024-05-06 RX ORDER — OXYCODONE HYDROCHLORIDE 5 MG/1
5 TABLET ORAL EVERY 4 HOURS PRN
Qty: 30 TABLET | Refills: 0 | Status: SHIPPED | OUTPATIENT
Start: 2024-05-06 | End: 2024-05-11

## 2024-05-06 RX ORDER — SODIUM CHLORIDE, SODIUM LACTATE, POTASSIUM CHLORIDE, CALCIUM CHLORIDE 600; 310; 30; 20 MG/100ML; MG/100ML; MG/100ML; MG/100ML
INJECTION, SOLUTION INTRAVENOUS CONTINUOUS
Status: DISCONTINUED | OUTPATIENT
Start: 2024-05-06 | End: 2024-05-06 | Stop reason: HOSPADM

## 2024-05-06 RX ORDER — MIDAZOLAM HYDROCHLORIDE 1 MG/ML
1 INJECTION INTRAMUSCULAR; INTRAVENOUS
Status: DISCONTINUED | OUTPATIENT
Start: 2024-05-06 | End: 2024-05-06 | Stop reason: HOSPADM

## 2024-05-06 RX ORDER — ONDANSETRON 2 MG/ML
4 INJECTION INTRAMUSCULAR; INTRAVENOUS
Status: DISCONTINUED | OUTPATIENT
Start: 2024-05-06 | End: 2024-05-06 | Stop reason: HOSPADM

## 2024-05-06 RX ORDER — DIPHENHYDRAMINE HYDROCHLORIDE 50 MG/ML
12.5 INJECTION INTRAMUSCULAR; INTRAVENOUS
Status: DISCONTINUED | OUTPATIENT
Start: 2024-05-06 | End: 2024-05-06 | Stop reason: HOSPADM

## 2024-05-06 RX ORDER — CEFAZOLIN SODIUM 1 G/3ML
INJECTION, POWDER, FOR SOLUTION INTRAMUSCULAR; INTRAVENOUS PRN
Status: DISCONTINUED | OUTPATIENT
Start: 2024-05-06 | End: 2024-05-06 | Stop reason: SURG

## 2024-05-06 RX ORDER — HALOPERIDOL 5 MG/ML
1 INJECTION INTRAMUSCULAR
Status: DISCONTINUED | OUTPATIENT
Start: 2024-05-06 | End: 2024-05-06 | Stop reason: HOSPADM

## 2024-05-06 RX ORDER — HYDROMORPHONE HYDROCHLORIDE 1 MG/ML
0.1 INJECTION, SOLUTION INTRAMUSCULAR; INTRAVENOUS; SUBCUTANEOUS
Status: DISCONTINUED | OUTPATIENT
Start: 2024-05-06 | End: 2024-05-06 | Stop reason: HOSPADM

## 2024-05-06 RX ORDER — TOBRAMYCIN 1.2 G/30ML
INJECTION, POWDER, LYOPHILIZED, FOR SOLUTION INTRAVENOUS
Status: DISCONTINUED | OUTPATIENT
Start: 2024-05-06 | End: 2024-05-06 | Stop reason: HOSPADM

## 2024-05-06 RX ORDER — HYDROMORPHONE HYDROCHLORIDE 2 MG/ML
INJECTION, SOLUTION INTRAMUSCULAR; INTRAVENOUS; SUBCUTANEOUS PRN
Status: DISCONTINUED | OUTPATIENT
Start: 2024-05-06 | End: 2024-05-06 | Stop reason: SURG

## 2024-05-06 RX ORDER — BUPIVACAINE HYDROCHLORIDE AND EPINEPHRINE 5; 5 MG/ML; UG/ML
INJECTION, SOLUTION EPIDURAL; INTRACAUDAL; PERINEURAL
Status: DISCONTINUED | OUTPATIENT
Start: 2024-05-06 | End: 2024-05-06 | Stop reason: HOSPADM

## 2024-05-06 RX ORDER — OXYCODONE HCL 5 MG/5 ML
10 SOLUTION, ORAL ORAL
Status: COMPLETED | OUTPATIENT
Start: 2024-05-06 | End: 2024-05-06

## 2024-05-06 RX ADMIN — HYDROMORPHONE HYDROCHLORIDE 2 MG: 2 INJECTION INTRAMUSCULAR; INTRAVENOUS; SUBCUTANEOUS at 10:25

## 2024-05-06 RX ADMIN — LIDOCAINE HYDROCHLORIDE 100 MG: 20 INJECTION, SOLUTION EPIDURAL; INFILTRATION; INTRACAUDAL at 10:08

## 2024-05-06 RX ADMIN — DEXAMETHASONE SODIUM PHOSPHATE 7 MG: 4 INJECTION, SOLUTION INTRA-ARTICULAR; INTRALESIONAL; INTRAMUSCULAR; INTRAVENOUS; SOFT TISSUE at 10:56

## 2024-05-06 RX ADMIN — ROPIVACAINE HYDROCHLORIDE 30 ML: 5 INJECTION, SOLUTION EPIDURAL; INFILTRATION; PERINEURAL at 10:18

## 2024-05-06 RX ADMIN — HYDROMORPHONE HYDROCHLORIDE 0.4 MG: 1 INJECTION, SOLUTION INTRAMUSCULAR; INTRAVENOUS; SUBCUTANEOUS at 13:34

## 2024-05-06 RX ADMIN — ONDANSETRON 4 MG: 2 INJECTION INTRAMUSCULAR; INTRAVENOUS at 11:32

## 2024-05-06 RX ADMIN — HYDROMORPHONE HYDROCHLORIDE 0.4 MG: 1 INJECTION, SOLUTION INTRAMUSCULAR; INTRAVENOUS; SUBCUTANEOUS at 13:58

## 2024-05-06 RX ADMIN — PROPOFOL 200 MG: 10 INJECTION, EMULSION INTRAVENOUS at 10:08

## 2024-05-06 RX ADMIN — FENTANYL CITRATE 50 MCG: 50 INJECTION, SOLUTION INTRAMUSCULAR; INTRAVENOUS at 13:30

## 2024-05-06 RX ADMIN — DEXAMETHASONE SODIUM PHOSPHATE 1 MG: 4 INJECTION INTRA-ARTICULAR; INTRALESIONAL; INTRAMUSCULAR; INTRAVENOUS; SOFT TISSUE at 10:18

## 2024-05-06 RX ADMIN — CEFAZOLIN 3 G: 1 INJECTION, POWDER, FOR SOLUTION INTRAMUSCULAR; INTRAVENOUS at 10:22

## 2024-05-06 RX ADMIN — FENTANYL CITRATE 50 MCG: 50 INJECTION, SOLUTION INTRAMUSCULAR; INTRAVENOUS at 13:21

## 2024-05-06 RX ADMIN — HYDRALAZINE HYDROCHLORIDE 5 MG: 20 INJECTION INTRAMUSCULAR; INTRAVENOUS at 12:50

## 2024-05-06 RX ADMIN — SODIUM CHLORIDE, POTASSIUM CHLORIDE, SODIUM LACTATE AND CALCIUM CHLORIDE: 600; 310; 30; 20 INJECTION, SOLUTION INTRAVENOUS at 10:02

## 2024-05-06 RX ADMIN — KETOROLAC TROMETHAMINE 15 MG: 15 INJECTION, SOLUTION INTRAMUSCULAR; INTRAVENOUS at 11:32

## 2024-05-06 RX ADMIN — HYDRALAZINE HYDROCHLORIDE 5 MG: 20 INJECTION INTRAMUSCULAR; INTRAVENOUS at 12:24

## 2024-05-06 RX ADMIN — HYDROMORPHONE HYDROCHLORIDE 0.4 MG: 1 INJECTION, SOLUTION INTRAMUSCULAR; INTRAVENOUS; SUBCUTANEOUS at 13:49

## 2024-05-06 RX ADMIN — FENTANYL CITRATE 100 MCG: 50 INJECTION, SOLUTION INTRAMUSCULAR; INTRAVENOUS at 10:05

## 2024-05-06 RX ADMIN — HYDROMORPHONE HYDROCHLORIDE 0.4 MG: 1 INJECTION, SOLUTION INTRAMUSCULAR; INTRAVENOUS; SUBCUTANEOUS at 13:25

## 2024-05-06 RX ADMIN — OXYCODONE HYDROCHLORIDE 10 MG: 5 SOLUTION ORAL at 13:20

## 2024-05-06 ASSESSMENT — PAIN DESCRIPTION - PAIN TYPE
TYPE: SURGICAL PAIN

## 2024-05-06 ASSESSMENT — FIBROSIS 4 INDEX: FIB4 SCORE: 1.52

## 2024-05-06 ASSESSMENT — PAIN SCALES - GENERAL: PAIN_LEVEL: 5

## 2024-05-06 NOTE — ANESTHESIA PROCEDURE NOTES
Airway    Date/Time: 5/6/2024 10:09 AM    Performed by: Bobby Metz M.D.  Authorized by: Bobby Metz M.D.    Location:  OR  Urgency:  Elective  Difficult Airway: No    Indications for Airway Management:  Anesthesia      Spontaneous Ventilation: absent    Sedation Level:  Deep  Preoxygenated: Yes    Final Airway Type:  Supraglottic airway  Final Supraglottic Airway:  Standard LMA    SGA Size:  3  Number of Attempts at Approach:  1

## 2024-05-06 NOTE — PROGRESS NOTES
Patient's post-op ride/friend is Renate at [phone]. She will be bringing patient's personal wheelchair to discharge

## 2024-05-06 NOTE — OR NURSING
Pt VSS, pain controlled, tolerating po intake. Pt and family given discharge education/instructions, all questions answered. IV discontinued, site wnl. Surgical site with ace wrap and bulky drsg, site wnl, CDI. Block intact, pt reports numbness to RLE, wiggles toes, warm on palpation, NWB RLE, dressed with assist, tolerated well. Pt discharged home in stable condition with all belongings.

## 2024-05-06 NOTE — OR NURSING
Patient recovered well in post-op. AAOx4. VSS, on RA. Surgical sites SREE, surgical dressing in place CDI. Surgical pain managed through PO and IV medications. Patient tolerating oral fluids without nausea. Friend updated and discussed POC. Glasses in place, no other belongings in pacu. Report called to NATALIA Hummel. Patient transported to phase II.

## 2024-05-06 NOTE — LETTER
May 3, 2024    Patient Name: Earnestine Knight  Surgeon Name: Gunnar Zamora M.D.  Surgery Facility: [facility] ([address] [place] [address])  Surgery Date: 5/6/2024    The time of your surgery is not final and may change up to and until the day of your surgery. You will be contacted 24-48 hours prior to your surgery date with your check-in and surgery time.    If you will not be at one of the below numbers please call the surgery scheduler at [phone]  Preferred Phone: [phone]    BEFORE YOUR SURGERY   Pre Registration and/or Lab Work must be done within and no earlier than 28 days prior to your surgery date. Your scheduled facility will contact you regarding all required preregistration and/or lab work. If you have not been contacted within 7 days of your scheduled procedure please call [facility] at [phone] for an appointment as soon as possible.    DAY OF YOUR SURGERY  Nothing to eat or drink after midnight     Refrain from smoking any substance after midnight prior to surgery. Smoking may interfere with the anesthetic and frequently produces nausea during the recovery period.    Continue taking all lifesaving medications. Including the morning of your surgery with small sip of water.    Please do NOT take on the day of surgery:  Diuretics: examples- furosemide (Lasix), spironolactone, hydrochlorothiazide  ACE-inhibitors: examples- lisinopril, ramipril, enalapril  “ARBs”: examples- losartan, Olmesartan, valsartan    Please arrive at the hospital/surgery center at the check-in time provided.     An adult will need to bring you and take you home after your surgery.     AFTER YOUR SURGERY  Post op Appointment:   Date: 5.16.24   Time: 1:45 PM   With: Jaciel Medrano PA-C   Location: [address] [place] [id number]    - Post Surgery - You will need someone to drive you home  - Post Surgery - You will need someone to stay with you for 24 hours     TIME OFF WORK  [facility]  or Disability forms can be faxed directly to: [phone] or you may drop them off at [address] [place] [address]. Our office charges a $35.00 fee per form. Forms will be completed within 10 business days of drop off and payment received. For the status of your forms you may contact our disability office directly [phone].    MEDICATION INSTRUCTIONS **Please read section completely**  The following medications should be stopped a minimum of 10 days prior to surgery:  All over the counter, Supplements & Herbal medications    Anorectics: Phentermine (Adipex-P, Lomaira and Suprenza), Phentermine-topiramate (Qsymia), Bupropion-naltrexone (Contrave)    **If you are on Bupropion for anxiety/depression, please continue this medication up until the day of surgery.     Opiod Partial Agonists/Opioid Antagonists: Buprenorphine (Suboxone, Belbuca, Butrans, Probuphine Implant, Sublocade), Naltrexone (ReVia, Vivitrol), Naloxone    Amphetamines: Dextroamphetamine/Amphetamine (Adderall, Mydayis), Methylphenidate Hydrochloride (Concerta, Metadate, Methylin, Ritalin)    The following medications should be stopped 5 days prior to surgery:  Blood Thinners: Any Aspirin, Aspirin products, anti-inflammatories such as ibuprofen and any blood thinners such as Coumadin and Plavix. Please consult your prescribing physician if you are on life saving blood thinners, in regards to when to stop medications prior to surgery.     The following medications should be stopped a minimum of 3 days prior to surgery:  PDE-5 inhibitors: Sildenafil (Viagra), Tadalafil (Cialis), Vardenafil (Levitra), Avanafil (Stendra)    MAO Inhibitors: Rasagiline (Azilect), Selegiline (Eldepryl, Emsam, Selapar), Isocarboxazid (Marplan), Phenelzine (Nardil)

## 2024-05-06 NOTE — PROGRESS NOTES
Med Rec complete per pt  Allergies Reviewed    Pt reports taking anticoagulant in the last 14 days  Anticoagulant: lovenox, Last dose: 1 week     Oriented - self; Oriented - place; Oriented - time

## 2024-05-06 NOTE — ANESTHESIA PROCEDURE NOTES
Peripheral Block    Date/Time: 5/6/2024 10:11 AM    Performed by: Bobby Metz M.D.  Authorized by: Bobby Metz M.D.    Patient Location:  OR  Start Time:  5/6/2024 10:11 AM  End Time:  5/6/2024 10:20 AM  Reason for Block: at surgeon's request and post-op pain management ONLY    patient identified, IV checked, site marked, risks and benefits discussed, surgical consent, monitors and equipment checked, pre-op evaluation and timeout performed    Patient Position:  Supine  Prep: ChloraPrep    Monitoring:  Heart rate, continuous pulse ox and cardiac monitor  Block Region:  Lower Extremity  Lower Extremity - Block Type:  FEMORAL nerve block, Supra-Inguinal Fascia Iliaca approach    Laterality:  Right  Procedures: ultrasound guided  Image captured, interpreted and electronically stored.  Local Infiltration:  Lidocaine  Strength:  1 %  Dose:  3 ml  Block Type:  Single-shot  Needle Length:  100mm  Needle Gauge:  21 G  Needle Localization:  Ultrasound guidance  Ultrasound picture in chart  Injection Assessment:  Negative aspiration for heme, no paresthesia on injection, incremental injection and local visualized surrounding nerve on ultrasound

## 2024-05-06 NOTE — DISCHARGE INSTRUCTIONS
HOME CARE INSTRUCTIONS    ACTIVITY: Rest and take it easy for the first 24 hours.  A responsible adult is recommended to remain with you during that time.  It is normal to feel sleepy.  We encourage you to not do anything that requires balance, judgment or coordination.    FOR 24 HOURS DO NOT:  Drive, operate machinery or run household appliances.  Drink beer or alcoholic beverages.  Make important decisions or sign legal documents.    SPECIAL INSTRUCTIONS:   No weightbearing on your right lower extremity for 6 weeks  Okay for knee motion with flexion extension  Lovenox injections daily for blood clot prevention  Pain medication as needed for the first couple days and switch to Tylenol and ibuprofen  Your dressings may come off 3 days after surgery, okay to shower at that time  No bathing or soaking  I did call in some antibiotics due to the superficial wound on your operative leg.  Take these as prescribed until completed  Call to schedule follow-up appointment for 2 weeks after surgery for wound check and suture removal    DIET: To avoid nausea, slowly advance diet as tolerated, avoiding spicy or greasy foods for the first day.  Add more substantial food to your diet according to your physician's instructions.  Babies can be fed formula or breast milk as soon as they are hungry.  INCREASE FLUIDS AND FIBER TO AVOID CONSTIPATION.    MEDICATIONS: Resume taking daily medication.  Take prescribed pain medication with food.  If no medication is prescribed, you may take non-aspirin pain medication if needed.  PAIN MEDICATION CAN BE VERY CONSTIPATING.  Take a stool softener or laxative such as senokot, pericolace, or milk of magnesia if needed.    Prescription given for _doxycycline, lovenox, oxycodone_.  Last pain medication given at _1:20pm_.    A follow-up appointment should be arranged with your doctor; call to schedule.    You should CALL YOUR PHYSICIAN if you develop:  Fever greater than 101 degrees F.  Pain not  relieved by medication, or persistent nausea or vomiting.  Excessive bleeding (blood soaking through dressing) or unexpected drainage from the wound.  Extreme redness or swelling around the incision site, drainage of pus or foul smelling drainage.  Inability to urinate or empty your bladder within 8 hours.  Problems with breathing or chest pain.    You should call 911 if you develop problems with breathing or chest pain.  If you are unable to contact your doctor or surgical center, you should go to the nearest emergency room or urgent care center.  Physician's telephone #: [phone]    MILD FLU-LIKE SYMPTOMS ARE NORMAL.  YOU MAY EXPERIENCE GENERALIZED MUSCLE ACHES, THROAT IRRITATION, HEADACHE AND/OR SOME NAUSEA.    If any questions arise, call your doctor.  If your doctor is not available, please feel free to call the Surgical Center at [phone].  The Center is open Monday through Friday from 7AM to 7PM.      A registered nurse may call you a few days after your surgery to see how you are doing after your procedure.    You may also receive a survey in the mail within the next two weeks and we ask that you take a few moments to complete the survey and return it to us.  Our goal is to provide you with very good care and we value your comments.     Depression / Suicide Risk    As you are discharged from this [facility] facility, it is important to learn how to keep safe from harming yourself.    Recognize the warning signs:  Abrupt changes in personality, positive or negative- including increase in energy   Giving away possessions  Change in eating patterns- significant weight changes-  positive or negative  Change in sleeping patterns- unable to sleep or sleeping all the time   Unwillingness or inability to communicate  Depression  Unusual sadness, discouragement and loneliness  Talk of wanting to die  Neglect of personal appearance   Rebelliousness- reckless behavior  Withdrawal from people/activities  they love  Confusion- inability to concentrate     If you or a loved one observes any of these behaviors or has concerns about self-harm, here's what you can do:  Talk about it- your feelings and reasons for harming yourself  Remove any means that you might use to hurt yourself (examples: pills, rope, extension cords, firearm)  Get professional help from the community (Mental Health, Substance Abuse, psychological counseling)  Do not be alone:Call your Safe Contact- someone whom you trust who will be there for you.  Call your local CRISIS HOTLINE 980-2196 or 594-201-3844  Call your local Children's Mobile Crisis Response Team [place] (825) 257-9549 or www.Howbuy  Call the toll free National Suicide Prevention Hotlines   National Suicide Prevention Lifeline 237-352-KGRR (6132)  National Hope Line Network 800-SUICIDE (704-2241)    I acknowledge receipt and understanding of these Home Care instructions.

## 2024-05-06 NOTE — ANESTHESIA POSTPROCEDURE EVALUATION
Patient: Earnestine Knight    Procedure Summary       Date: 05/06/24 Room / Location: [place] [address] / SURGERY [facility]    Anesthesia Start: 1002 Anesthesia Stop: 1153    Procedure: RIGHT OPEN REDUCTION INTERNAL FIXATION TIBIAL PLATEAU (Right: Leg Lower) Diagnosis:       Tibial plateau fracture, right, closed, initial encounter      (Tibial plateau fracture, right,)    Surgeons: Gunnar Zamora M.D. Responsible Provider: Bobby Metz M.D.    Anesthesia Type: general, peripheral nerve block ASA Status: 3            Final Anesthesia Type: general, peripheral nerve block  Last vitals  BP   Blood Pressure: (!) 166/82    Temp   36.4 °C (97.6 °F)    Pulse   98   Resp   20    SpO2   95 %      Anesthesia Post Evaluation    Patient location during evaluation: PACU  Patient participation: complete - patient participated  Level of consciousness: awake and alert  Pain score: 5    Airway patency: patent  Anesthetic complications: no  Cardiovascular status: hemodynamically stable  Respiratory status: acceptable  Hydration status: euvolemic    PONV: none        No notable events documented.     Nurse Pain Score: 0 (NPRS)

## 2024-05-06 NOTE — ANESTHESIA PREPROCEDURE EVALUATION
Case: [id number] Date/Time: 05/06/24 1004    Procedure: RIGHT OPEN REDUCTION INTERNAL FIXATION TIBIAL PLATEAU (Right)    Diagnosis: Tibial plateau fracture, right, closed, initial encounter [S82.141A]    Pre-op diagnosis: Tibial plateau fracture, right, closed, initial encounter [S82.141A]    Location: [place] [address] / SURGERY [facility]    Surgeons: Gunnar Zamora M.D.            Relevant Problems   No relevant active problems       Physical Exam    Airway   Mallampati: II  TM distance: >3 FB  Neck ROM: full       Cardiovascular - normal exam  Rhythm: regular  Rate: normal  (-) murmur     Dental - normal exam           Pulmonary - normal exam  Breath sounds clear to auscultation     Abdominal   (+) obese     Neurological - normal exam               Anesthesia Plan    ASA 3 (chart)       Plan - general and peripheral nerve block     Peripheral nerve block will be post-op pain control  Airway plan will be LMA          Induction: intravenous    Postoperative Plan: Postoperative administration of opioids is intended.    Pertinent diagnostic labs and testing reviewed    Informed Consent:    Anesthetic plan and risks discussed with patient.    Use of blood products discussed with: patient whom consented to blood products.

## 2024-05-06 NOTE — ANESTHESIA TIME REPORT
Anesthesia Start and Stop Event Times       Date Time Event    5/6/2024 1002 Anesthesia Start     1004 Ready for Procedure     1153 Anesthesia Stop          Responsible Staff  05/06/24      Name Role Begin End    Bobby Metz M.D. Anesth 1002 1153          Overtime Reason:  no overtime (within assigned shift)    Comments:

## 2024-05-06 NOTE — H&P
Surgery Orthopedic History & Physical Note    Date  5/6/2024    Primary Care Physician  Pcp Pt States None    CC  Pre-Op Diagnosis Codes:     * Tibial plateau fracture, right, closed, initial encounter [S82.141A]    HPI  Chief Complaint:  Pain of the Right Knee and Pain of the Pelvis     Last Surgery: No surgery found on No surgery found     HPI  Pain Assessment  Pain Assessment: 0-10  Pain Score: 10 - Worst possible pain  Pain Location: Knee  Pain Orientation: Right  Pain Descriptors: Burning, Pressure, Numbness  Pain Frequency: Intermittent     Patient is here for initial evaluation for right tibial plateau fracture as well as nondisplaced left superior inferior pubic ramus fractures.  She is seen outside hospital and then sent to acute rehab due to her inability to ambulate.  She ended up leaving [facility] as she was not happy with the way they treated her per her report.              Review of Systems           Objective   General: Well nourished, well developed, age appropriate appearance   HEENT: Normocephalic, atraumatic  Psych: Normal mood and affect  Neck: Supple, no pain to motion  Chest/Pulmonary: breathing unlabored, no audible wheezing  Ortho: Right lower extremity: Minimal swelling noted.  No gross instability varus valgus anterior posterior stress although difficult discern due to her     Last Imaging Result(s):   DX-KNEE 2- RIGHT  Multiple views of the right knee showing lateral split depression type   tibial plateau fracture with moderate joint depression.  Fracture line   extends down the metaphyseal region.  DX-PELVIS-TRAUMA SERIES  3-  Multiple views of the pelvis showing possible left superior inferior pubic   ramus fractures which are nondisplaced.  Difficult to discern details due   to obesity              Assessment & Plan   Encounter Diagnoses:   Closed fracture of right tibial plateau, initial encounter     Closed fracture of multiple pubic rami, left, initial encounter ([facility])     We discussed  x-ray CT scan findings show displaced joint split depression type fracture of the lateral tibial plateau.  We discussed based on her weight surgical option would be risky and have complication risk especially with wound complication as well as posttraumatic arthritis.  We discussed either way she is going to have to stay off this knee for likely 6 weeks.  I do recommend surgical intervention based on amount of joint depression and her age.  She is agrees with the plan and understands the risks.  Will plan to get this scheduled at the earliest available convenience.     Pre-Op Diagnosis Codes:     * Tibial plateau fracture, right, closed, initial encounter [S82.141A]  Procedure(s):  RIGHT OPEN REDUCTION INTERNAL FIXATION TIBIAL PLATEAU

## 2024-05-07 NOTE — OP REPORT
DATE OF OPERATION: 5/6/2024     PREOPERATIVE DIAGNOSIS: Right lateral split depression type tibial plateau fracture     POSTOPERATIVE DIAGNOSIS: Same with lateral meniscal tear     PROCEDURE PERFORMED: Open reduction internal fixation right lateral tibial plateau fracture  Open repair right lateral meniscus     SURGEON: Gunnar Zamora M.D.      ASSISTANT: Paresh Maravilla MD, fellow     ANESTHESIA: General     SPECIMEN: None     ESTIMATED BLOOD LOSS: 20 mL     IMPLANTS: OIC lateral tibial plateau plate        INDICATIONS: The patient is a 45 y.o. female who presented with above.  I discussed the risks and benefits of the procedure which include but are not limited to risks of infection, wound healing complication, neurovascular injury, pain, malunion, non-union, malrotation, and the medical risks of anesthesia including MI, stroke, and death.  Alternatives to surgery were also discussed, including non-operative management, which I did not recommend.  The patient was in agreement with the plan to proceed, and the informed consent was signed and documented.  I met with the patient pre-operatively and marked the operative extremity with their agreement.  We proceeded to the operating room.      DESCRIPTION OF PROCEDURE:  Patient was seen in the preoperative holding area on the day of surgery. The operative site was marked with my initials.  she was taken to the operating room and placed supine on the operative table.  Anesthesia was induced.  The operative extremity was prepped and draped in the normal sterile fashion.  Operative pause was conducted and the correct patient, site, side, procedure, and surgeon's initials on extremity were identified.  Standard anterior lateral approach to the proximal tibia was performed.  Fascia was split in line with skin incision.  Soft tissue was released off Madai's tubercle.  This point time submeniscal arthrotomy was performed.  Joint space noted to have large amount of  hematoma which was evacuated.  There is a large dye punch depressed fragment central aspect of the lateral plateau.  There is also extension of the split which was minimally displaced.  Further investigation revealed a small horizontal tear in the meniscus.  This was tagged for later repair.  We then made a small corticotomy using a 2.5 mm drill through which we placed our bone tamp.  Under fluoroscopic guidance we then elevated the small comminuted articular pieces back to reasonable position.  We then backfilled with cortical cancellous allograft chips.  We again used a bone tamp to support the elevated articular fragments.  This point time we checked the joint reduction on AP lateral views and noted to be appropriate we placed our plate in position.  We placed our meniscal repair suture through the plate and secured it.  Once it is in appropriate position we drilled and placed a nonlocking screw just distal to the articular surface.  This was done and lag by technique fashion to allow for some compression of that split.  Next we drilled and placed multiple nonlocking screws into the shaft.  We then drilled and placed multiple locking screws proximally.  Final images were obtained again appropriate reduction implant position.  Wound was then thoroughly irrigated sterile saline.  Vancomycin tobramycin powder placed in the wound.  The wound was then closed in layered fashion.  Sterile dressings were applied she was awoken taken to PACU stable condition.     POSTOPERATIVE PLAN: Nonweightbearing right lower extremity, motion as tolerated.  Lovenox daily for DVT prophylaxis.  Pain medication Lovenox called in the pharmacy.  The patient will follow up in clinic in 2 weeks to check wounds and remove sutures/staples.

## 2024-05-14 ENCOUNTER — PHARMACY VISIT (OUTPATIENT)
Dept: PHARMACY | Facility: MEDICAL CENTER | Age: 29
End: 2024-05-14
Payer: COMMERCIAL

## 2024-05-14 PROCEDURE — RXMED WILLOW AMBULATORY MEDICATION CHARGE

## 2024-05-21 ENCOUNTER — PHARMACY VISIT (OUTPATIENT)
Dept: PHARMACY | Facility: MEDICAL CENTER | Age: 29
End: 2024-05-21
Payer: COMMERCIAL

## 2024-05-21 PROCEDURE — RXMED WILLOW AMBULATORY MEDICATION CHARGE

## 2024-10-24 ENCOUNTER — APPOINTMENT (OUTPATIENT)
Dept: URGENT CARE | Facility: PHYSICIAN GROUP | Age: 29
End: 2024-10-24

## 2024-10-24 ENCOUNTER — APPOINTMENT (OUTPATIENT)
Dept: RADIOLOGY | Facility: IMAGING CENTER | Age: 29
End: 2024-10-24
Attending: NURSE PRACTITIONER
Payer: MEDICAID

## 2024-10-24 ENCOUNTER — OFFICE VISIT (OUTPATIENT)
Dept: URGENT CARE | Facility: CLINIC | Age: 29
End: 2024-10-24
Payer: MEDICAID

## 2024-10-24 VITALS
BODY MASS INDEX: 50.14 KG/M2 | HEART RATE: 102 BPM | TEMPERATURE: 97.6 F | OXYGEN SATURATION: 99 % | DIASTOLIC BLOOD PRESSURE: 92 MMHG | SYSTOLIC BLOOD PRESSURE: 146 MMHG | WEIGHT: 283 LBS | HEIGHT: 63 IN | RESPIRATION RATE: 18 BRPM
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
## 2024-10-24 DIAGNOSIS — S89.91XA INJURY OF RIGHT KNEE, INITIAL ENCOUNTER: ICD-10-CM

## 2024-10-24 DIAGNOSIS — S09.93XA FACIAL INJURY, INITIAL ENCOUNTER: ICD-10-CM

## 2024-10-24 DIAGNOSIS — R03.0 ELEVATED BLOOD PRESSURE READING: ICD-10-CM

## 2024-10-24 PROBLEM — M62.81 MUSCLE WEAKNESS (GENERALIZED): Status: ACTIVE | Noted: 2024-04-26

## 2024-10-24 PROBLEM — G47.00 INSOMNIA: Status: ACTIVE | Noted: 2024-04-26

## 2024-10-24 PROBLEM — I10 ESSENTIAL (PRIMARY) HYPERTENSION: Status: ACTIVE | Noted: 2024-04-26

## 2024-10-24 PROBLEM — Z74.09 OTHER REDUCED MOBILITY: Status: ACTIVE | Noted: 2024-04-26

## 2024-10-24 PROBLEM — R41.841 COGNITIVE COMMUNICATION DEFICIT: Status: ACTIVE | Noted: 2024-04-26

## 2024-10-24 PROBLEM — F15.20 STIMULANT DEPENDENCE (HCC): Status: ACTIVE | Noted: 2022-03-13

## 2024-10-24 PROBLEM — F11.20 OPIOID DEPENDENCE (HCC): Status: ACTIVE | Noted: 2024-04-26

## 2024-10-24 PROBLEM — F43.10 PTSD (POST-TRAUMATIC STRESS DISORDER): Status: ACTIVE | Noted: 2022-03-13

## 2024-10-24 PROBLEM — R26.2 DIFFICULTY IN WALKING, NOT ELSEWHERE CLASSIFIED: Status: ACTIVE | Noted: 2024-04-26

## 2024-10-24 PROBLEM — S82.141D: Status: ACTIVE | Noted: 2024-04-26

## 2024-10-24 PROBLEM — S32.592D OTHER SPECIFIED FRACTURE OF LEFT PUBIS, SUBSEQUENT ENCOUNTER FOR FRACTURE WITH ROUTINE HEALING: Status: ACTIVE | Noted: 2024-04-26

## 2024-10-24 PROBLEM — R26.81 UNSTEADINESS ON FEET: Status: ACTIVE | Noted: 2024-04-26

## 2024-10-24 PROBLEM — F60.3 BORDERLINE PERSONALITY DISORDER (HCC): Status: ACTIVE | Noted: 2022-03-13

## 2024-10-24 PROBLEM — R26.9 UNSPECIFIED ABNORMALITIES OF GAIT AND MOBILITY: Status: ACTIVE | Noted: 2024-04-26

## 2024-10-24 PROBLEM — F41.9 ANXIETY DISORDER, UNSPECIFIED: Status: ACTIVE | Noted: 2024-04-26

## 2024-10-24 PROCEDURE — 99213 OFFICE O/P EST LOW 20 MIN: CPT | Performed by: NURSE PRACTITIONER

## 2024-10-24 PROCEDURE — 73564 X-RAY EXAM KNEE 4 OR MORE: CPT | Mod: TC,RT | Performed by: NURSE PRACTITIONER

## 2024-10-24 RX ORDER — BUPROPION HYDROCHLORIDE 100 MG/1
TABLET, EXTENDED RELEASE ORAL
COMMUNITY
Start: 2024-09-17

## 2024-10-24 ASSESSMENT — FIBROSIS 4 INDEX: FIB4 SCORE: 1.52

## 2025-04-23 ENCOUNTER — NON-PROVIDER VISIT (OUTPATIENT)
Dept: OBGYN | Facility: CLINIC | Age: 30
End: 2025-04-23
Payer: MEDICAID

## 2025-04-23 DIAGNOSIS — N92.6 MISSED MENSES: ICD-10-CM

## 2025-04-23 LAB
POCT INT CON NEG: NEGATIVE
POCT INT CON POS: POSITIVE
POCT URINE PREGNANCY TEST: NEGATIVE

## 2025-04-23 PROCEDURE — 81025 URINE PREGNANCY TEST: CPT

## 2025-04-23 NOTE — PROGRESS NOTES
Pt came to clinic as a walk-in pregnancy test.    LMP : Sometime in February - Spotting in March/April  UPT : Negative  Pt states she had already had 4 positive UPT before coming today.     Consulted with Sharita STEEL and pt is to return in 1-2 weeks for repeat UPT testing.

## 2025-04-30 ENCOUNTER — NON-PROVIDER VISIT (OUTPATIENT)
Dept: OBGYN | Facility: CLINIC | Age: 30
End: 2025-04-30
Payer: MEDICAID

## 2025-04-30 DIAGNOSIS — Z32.00 ENCOUNTER FOR PREGNANCY TEST, RESULT UNKNOWN: ICD-10-CM

## 2025-04-30 LAB
POCT INT CON NEG: NEGATIVE
POCT INT CON POS: POSITIVE
POCT URINE PREGNANCY TEST: NEGATIVE

## 2025-04-30 NOTE — PROGRESS NOTES
Pt here for nurse visit for pregnancy test   LMP unknown   UPT in clinic today negative   Notified pt of negative pregnancy test and given option to schedule a Gyn visit in our clinic pt verbalized understanding.

## 2025-06-09 ENCOUNTER — HOSPITAL ENCOUNTER (EMERGENCY)
Facility: MEDICAL CENTER | Age: 30
End: 2025-06-09
Attending: EMERGENCY MEDICINE
Payer: MEDICAID

## 2025-06-09 VITALS
SYSTOLIC BLOOD PRESSURE: 160 MMHG | HEIGHT: 63 IN | RESPIRATION RATE: 19 BRPM | DIASTOLIC BLOOD PRESSURE: 109 MMHG | BODY MASS INDEX: 51.91 KG/M2 | WEIGHT: 293 LBS | OXYGEN SATURATION: 95 % | TEMPERATURE: 97 F | HEART RATE: 102 BPM

## 2025-06-09 DIAGNOSIS — N39.0 URINARY TRACT INFECTION WITHOUT HEMATURIA, SITE UNSPECIFIED: ICD-10-CM

## 2025-06-09 DIAGNOSIS — L08.9 SKIN INFECTION: ICD-10-CM

## 2025-06-09 DIAGNOSIS — R19.7 DIARRHEA OF PRESUMED INFECTIOUS ORIGIN: Primary | ICD-10-CM

## 2025-06-09 LAB
APPEARANCE UR: ABNORMAL
BACTERIA #/AREA URNS HPF: ABNORMAL /HPF
BILIRUB UR QL STRIP.AUTO: NEGATIVE
CA OXALATE CRYSTAL  1765: PRESENT /HPF
CASTS URNS QL MICRO: >20 /LPF (ref 0–2)
COLOR UR: ABNORMAL
EPITHELIAL CELLS 1715: ABNORMAL /HPF (ref 0–5)
GLUCOSE BLD STRIP.AUTO-MCNC: 106 MG/DL (ref 65–99)
GLUCOSE UR STRIP.AUTO-MCNC: NEGATIVE MG/DL
HYALINE CAST   1831: PRESENT /LPF
KETONES UR STRIP.AUTO-MCNC: ABNORMAL MG/DL
LEUKOCYTE ESTERASE UR QL STRIP.AUTO: ABNORMAL
MICRO URNS: ABNORMAL
NITRITE UR QL STRIP.AUTO: POSITIVE
PH UR STRIP.AUTO: 5.5 [PH] (ref 5–8)
PROT UR QL STRIP: 30 MG/DL
RBC # URNS HPF: ABNORMAL /HPF (ref 0–2)
RBC UR QL AUTO: ABNORMAL
SP GR UR STRIP.AUTO: 1.02
UROBILINOGEN UR STRIP.AUTO-MCNC: 1 EU/DL
WBC #/AREA URNS HPF: ABNORMAL /HPF

## 2025-06-09 PROCEDURE — 81001 URINALYSIS AUTO W/SCOPE: CPT

## 2025-06-09 PROCEDURE — 82962 GLUCOSE BLOOD TEST: CPT

## 2025-06-09 PROCEDURE — 99283 EMERGENCY DEPT VISIT LOW MDM: CPT

## 2025-06-09 RX ORDER — SODIUM CHLORIDE, SODIUM LACTATE, POTASSIUM CHLORIDE, AND CALCIUM CHLORIDE .6; .31; .03; .02 G/100ML; G/100ML; G/100ML; G/100ML
1000 INJECTION, SOLUTION INTRAVENOUS ONCE
Status: DISCONTINUED | OUTPATIENT
Start: 2025-06-09 | End: 2025-06-09 | Stop reason: HOSPADM

## 2025-06-09 RX ORDER — PROCHLORPERAZINE EDISYLATE 5 MG/ML
10 INJECTION INTRAMUSCULAR; INTRAVENOUS ONCE
Status: DISCONTINUED | OUTPATIENT
Start: 2025-06-09 | End: 2025-06-09 | Stop reason: HOSPADM

## 2025-06-09 RX ORDER — MUPIROCIN 2 %
OINTMENT (GRAM) TOPICAL ONCE
Status: DISCONTINUED | OUTPATIENT
Start: 2025-06-09 | End: 2025-06-09 | Stop reason: HOSPADM

## 2025-06-09 RX ORDER — KETOROLAC TROMETHAMINE 15 MG/ML
15 INJECTION, SOLUTION INTRAMUSCULAR; INTRAVENOUS ONCE
Status: DISCONTINUED | OUTPATIENT
Start: 2025-06-09 | End: 2025-06-09 | Stop reason: HOSPADM

## 2025-06-09 RX ORDER — MUPIROCIN 2 %
1 OINTMENT (GRAM) TOPICAL 2 TIMES DAILY
Qty: 22 G | Refills: 0 | Status: SHIPPED | OUTPATIENT
Start: 2025-06-09

## 2025-06-09 RX ORDER — DIPHENHYDRAMINE HYDROCHLORIDE 50 MG/ML
25 INJECTION, SOLUTION INTRAMUSCULAR; INTRAVENOUS ONCE
Status: DISCONTINUED | OUTPATIENT
Start: 2025-06-09 | End: 2025-06-09 | Stop reason: HOSPADM

## 2025-06-09 ASSESSMENT — FIBROSIS 4 INDEX: FIB4 SCORE: 1.57

## 2025-06-09 ASSESSMENT — PAIN DESCRIPTION - PAIN TYPE: TYPE: ACUTE PAIN

## 2025-06-10 PROCEDURE — RXMED WILLOW AMBULATORY MEDICATION CHARGE: Performed by: EMERGENCY MEDICINE

## 2025-06-10 RX ORDER — CEFDINIR 300 MG/1
300 CAPSULE ORAL 2 TIMES DAILY
Qty: 10 CAPSULE | Refills: 0 | Status: ACTIVE | OUTPATIENT
Start: 2025-06-10 | End: 2025-06-19

## 2025-06-10 RX ORDER — CEFDINIR 300 MG/1
300 CAPSULE ORAL 2 TIMES DAILY
Qty: 20 CAPSULE | Refills: 0 | Status: SHIPPED | OUTPATIENT
Start: 2025-06-10 | End: 2025-06-10

## 2025-06-10 NOTE — ED NOTES
Report from NATALIA Miguel   Pt resting comfortably in bed at this time. NAD. Pt reporting migraine with no change since initial arrival. Pt on monitor, and on RA. Call light within reach. Bed locked in lowest position.
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
## 2025-06-10 NOTE — DISCHARGE INSTRUCTIONS
Try to drink as much water as possible to keep up with the diarrhea you are having.  If you are having worsening diarrhea, bloody diarrhea or severe abdominal pain please come back to the ER.  Use the topical antibiotic ointment on The affected area twice a day for 5 to 7 days until it resolves.

## 2025-06-10 NOTE — ED NOTES
Pt expressing wanting to leave AMA. Education provided, pt verbalized understanding of leaving AMA. ERP updated.   Pt agreeable to sign AMA form. Discharge paperwork provided and education given, pt verbalized understanding. Pt ambulated out with steady gate with all belongings.
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
## 2025-06-14 ENCOUNTER — PHARMACY VISIT (OUTPATIENT)
Dept: PHARMACY | Facility: MEDICAL CENTER | Age: 30
End: 2025-06-14
Payer: COMMERCIAL

## (undated) DEVICE — SUTURE 1 CHROMIC GUTCT-1 27 (36PK/BX)"

## (undated) DEVICE — CHLORAPREP 26 ML APPLICATOR - ORANGE TINT(25/CA)

## (undated) DEVICE — CANISTER SUCTION 3000ML MECHANICAL FILTER AUTO SHUTOFF MEDI-VAC NONSTERILE LF DISP  (40EA/CA)

## (undated) DEVICE — SET EXTENSION WITH 2 PORTS (48EA/CA) ***PART #2C8610 IS A SUBSTITUTE*****

## (undated) DEVICE — PADDING CAST 6 IN STERILE - 6 X 4 YDS (24/CA)

## (undated) DEVICE — ELECTRODE DUAL RETURN W/ CORD - (50/PK)

## (undated) DEVICE — SODIUM CHL IRRIGATION 0.9% 1000ML (12EA/CA)

## (undated) DEVICE — HEAD HOLDER JUNIOR/ADULT

## (undated) DEVICE — PUMP MITYONE W/SOFT CUP (12EA/BX)

## (undated) DEVICE — SLEEVE, SEQUENTIAL CALF REG

## (undated) DEVICE — SLEEVE, VASO, THIGH, MED

## (undated) DEVICE — GOWN WARMING STANDARD FLEX - (30/CA)

## (undated) DEVICE — SUTURE 2-0 VICRYL PLUS CT-1 - 8 X 18 INCH(12/BX)

## (undated) DEVICE — SUTURE 0 VICRYL PLUS CT-1 - 8 X 18 INCH (12/BX)

## (undated) DEVICE — BANDAGE ELASTIC 6 HONEYCOMB - 6X5YD LF (20/CA)"

## (undated) DEVICE — GLOVE BIOGEL PI INDICATOR SZ 6.5 SURGICAL PF LF - (50/BX 4BX/CA)

## (undated) DEVICE — BANDAGE ELASTIC STERILE MATRIX 6 X 10 (20EA/CA)

## (undated) DEVICE — STAPLER SKIN DISP - (6/BX 10BX/CA) VISISTAT

## (undated) DEVICE — LACTATED RINGERS INJ 1000 ML - (14EA/CA 60CA/PF)

## (undated) DEVICE — BLANKET UNDERBODY FULL ACCES - (5/CA)

## (undated) DEVICE — WATER IRRIG. STER. 1500 ML - (9/CA)

## (undated) DEVICE — PACK C-SECTION (2EA/CA)

## (undated) DEVICE — DRAPE C ARMOR (12EA/CA)

## (undated) DEVICE — SPONGE GAUZESTER 4 X 4 4PLY - (128PK/CA)

## (undated) DEVICE — GLOVE BIOGEL SZ 7.5 SURGICAL PF LTX - (50PR/BX 4BX/CA)

## (undated) DEVICE — MASK AIRWAY FLEXIBLE SINGLE-USE SIZE 4 ADULTS (10EA/BX)

## (undated) DEVICE — TOURNIQUET CUFF 34 X 4 ONE PORT DISP - STERILE (10/BX)

## (undated) DEVICE — TRAY SPINAL ANESTHESIA NON-SAFETY (10/CA)

## (undated) DEVICE — TAPE CLOTH MEDIPORE 6 INCH - (12RL/CA)

## (undated) DEVICE — CLIPS FILSHIE FOR TUBAL - LIGATION SYSTEM (20PR/BX)

## (undated) DEVICE — GOWN SURGEONS X-LARGE - DISP. (30/CA)

## (undated) DEVICE — KIT  I.V. START (100EA/CA)

## (undated) DEVICE — GLOVE BIOGEL SZ 7 SURGICAL PF LTX - (50PR/BX 4BX/CA)

## (undated) DEVICE — SUTURE 0 VICRYL PLUS CT 36 (36PK/BX)"

## (undated) DEVICE — PAD LAP STERILE 18 X 18 - (5/PK 40PK/CA)

## (undated) DEVICE — STAPLER 35MM SKIN WIDE ROTATING HEAD (6EA/BX)

## (undated) DEVICE — DRAPE 36X28IN RAD CARM BND BG - (25/CA) O

## (undated) DEVICE — BLADE SURGICAL #10 - (50/BX)

## (undated) DEVICE — IMMOBILIZER KNEE 20 INCH - (1/EA)

## (undated) DEVICE — BOVIE BLADE COATED &INSULATED - 25/PK

## (undated) DEVICE — SENSOR OXIMETER ADULT SPO2 RD SET (20EA/BX)

## (undated) DEVICE — PACK LOWER EXTREMITY - (2/CA)

## (undated) DEVICE — TUBING CLEARLINK DUO-VENT - C-FLO (48EA/CA)

## (undated) DEVICE — WRAP COBAN SELF-ADHERENT 6 IN X  5YDS STERILE TAN (12/CA)

## (undated) DEVICE — SET LEADWIRE 5 LEAD BEDSIDE DISPOSABLE ECG (1SET OF 5/EA)

## (undated) DEVICE — SUCTION INSTRUMENT YANKAUER BULBOUS TIP W/O VENT (50EA/CA)

## (undated) DEVICE — GLOVE BIOGEL INDICATOR SZ 8 SURGICAL PF LTX - (50/BX 4BX/CA)

## (undated) DEVICE — GLOVE BIOGEL INDICATOR SZ 7SURGICAL PF LTX - (50/BX 4BX/CA)

## (undated) DEVICE — DRAPE LARGE 3 QUARTER - (20/CA)

## (undated) DEVICE — DRAPE U SPLIT IMP 54 X 76 - (24/CA)

## (undated) DEVICE — NEEDLE W/FACET S TIP ECHOGENIC W/STIMULATION CABLE SONOPLEX II 21G X 4IN (10EA/BX)

## (undated) DEVICE — NEEDLE MAYO CATGUT TPR POINT - (2EA/PK20PK/BX)

## (undated) DEVICE — GLOVE BIOGEL PI ORTHO SZ 6 1/2 SURGICAL PF LF (40PR/BX)

## (undated) DEVICE — SHEATH RO 4F 10CM (10EA/BX)

## (undated) DEVICE — TRAY BLADDER CARE W/ 16 FR FOLEY CATHETER STATLOCK  (10/CA)

## (undated) DEVICE — CATHETER IV NON-SAFETY 18 GA X 1 1/4 (50/BX 4BX/CA)

## (undated) DEVICE — DETERGENT RENUZYME PLUS 10 OZ PACKET (50/BX)

## (undated) DEVICE — DRESSING NON-ADHERING 8 X 3 - (50/BX)

## (undated) DEVICE — COVER LIGHT HANDLE ALC PLUS DISP (18EA/BX)

## (undated) DEVICE — GLOVE SZ 6.5 BIOGEL PI MICRO - PF LF (50PR/BX)

## (undated) DEVICE — SUTURE 3-0 ETHILON FSLX 30 (36PK/BX)"

## (undated) DEVICE — PAD PREP 24 X 48 CUFFED - (100/CA)